# Patient Record
Sex: FEMALE | Race: WHITE | Employment: FULL TIME | URBAN - METROPOLITAN AREA
[De-identification: names, ages, dates, MRNs, and addresses within clinical notes are randomized per-mention and may not be internally consistent; named-entity substitution may affect disease eponyms.]

---

## 2017-01-06 ENCOUNTER — OFFICE VISIT (OUTPATIENT)
Dept: FAMILY MEDICINE | Facility: CLINIC | Age: 32
End: 2017-01-06
Payer: COMMERCIAL

## 2017-01-06 VITALS
SYSTOLIC BLOOD PRESSURE: 108 MMHG | HEIGHT: 64 IN | HEART RATE: 76 BPM | WEIGHT: 120 LBS | TEMPERATURE: 98.7 F | DIASTOLIC BLOOD PRESSURE: 76 MMHG | BODY MASS INDEX: 20.49 KG/M2

## 2017-01-06 DIAGNOSIS — N63.0 LUMP OR MASS IN BREAST: Primary | ICD-10-CM

## 2017-01-06 PROCEDURE — 99213 OFFICE O/P EST LOW 20 MIN: CPT | Performed by: FAMILY MEDICINE

## 2017-01-06 NOTE — NURSING NOTE
"Chief Complaint   Patient presents with     Breast Problem       Initial /76 mmHg  Pulse 76  Temp(Src) 98.7  F (37.1  C) (Oral)  Ht 5' 3.75\" (1.619 m)  Wt 120 lb (54.432 kg)  BMI 20.77 kg/m2  LMP 01/01/2017 Estimated body mass index is 20.77 kg/(m^2) as calculated from the following:    Height as of this encounter: 5' 3.75\" (1.619 m).    Weight as of this encounter: 120 lb (54.432 kg).  BP completed using cuff size: regular    Swapna Corley MA     "

## 2017-01-06 NOTE — MR AVS SNAPSHOT
"              After Visit Summary   2017    Enid Mcbride    MRN: [id number]           Patient Information     Date Of Birth          1985        Visit Information        Provider Department      2017 2:20 PM Diana Morrow DO [facility]        Today's Diagnoses     Lump or mass in breast    -  1        Follow-ups after your visit        Future tests that were ordered for you today     Open Future Orders        Priority Expected Expires Ordered    MA Diagnostic Digital Bilateral Routine  2018            Who to contact     If you have questions or need follow up information about today's clinic visit or your schedule please contact [facility] directly at [phone].  Normal or non-critical lab and imaging results will be communicated to you by MyChart, letter or phone within 4 business days after the clinic has received the results. If you do not hear from us within 7 days, please contact the clinic through MyChart or phone. If you have a critical or abnormal lab result, we will notify you by phone as soon as possible.  Submit refill requests through [employer] or call your pharmacy and they will forward the refill request to us. Please allow 3 business days for your refill to be completed.          Additional Information About Your Visit        MyChart Information     [employer] lets you send messages to your doctor, view your test results, renew your prescriptions, schedule appointments and more. To sign up, go to [email or web address] . Click on \"Log in\" on the left side of the screen, which will take you to the Welcome page. Then click on \"Sign up Now\" on the right side of the page.     You will be asked to enter the access code listed below, as well as some personal information. Please follow the directions to create your username and password.     Your access code is: [email or web address]  Expires: 2017  3:02 PM     Your access code will  in 90 " "days. If you need help or a new code, please call your [place] clinic or [phone].        Care EveryWhere ID     This is your Care EveryWhere ID. This could be used by other organizations to access your [place] medical records  [id number]        Your Vitals Were     Pulse Temperature Height BMI (Body Mass Index) Last Period       76 98.7  F (37.1  C) (Oral) 5' 3.75\" (1.619 m) 20.77 kg/m2 01/01/2017        Blood Pressure from Last 3 Encounters:   01/06/17 108/76    Weight from Last 3 Encounters:   01/06/17 120 lb (54.432 kg)               Primary Care Provider    None Specified       No primary provider on file.        Thank you!     Thank you for choosing [facility]  for your care. Our goal is always to provide you with excellent care. Hearing back from our patients is one way we can continue to improve our services. Please take a few minutes to complete the written survey that you may receive in the mail after your visit with us. Thank you!             Your Updated Medication List - Protect others around you: Learn how to safely use, store and throw away your medicines at www.disposemymeds.org.          This list is accurate as of: 1/6/17  3:02 PM.  Always use your most recent med list.                   Brand Name Dispense Instructions for use    FLAGYL PO      Take once a month or as needed.         "

## 2017-01-06 NOTE — PROGRESS NOTES
"  SUBJECTIVE:                                                    Enid Mcbride is a 31 year old female who presents to clinic today for the following health issues:    Concern - breast problem     Onset: May 2016     Description:   Patient noticed a painful spot on her left breast in May 2016. She thinks that the spot feels different but she is not sure.    Intensity: moderate    Progression of Symptoms:  worsening    Accompanying Signs & Symptoms:  nothing       Previous history of similar problem:   Family history of breast cancer--mother gandmothers     Precipitating factors:   Worsened by: na    Alleviating factors:  Improved by: na       Therapies Tried and outcome: na    It is tender to the touch and has a mild discomfort without touching it.  No obvious lumps.  She removed her IUD last week and is now on the pill.  She has  her child and weaned in 2011.         Problem list and histories reviewed & adjusted, as indicated.  Additional history: as documented    BP Readings from Last 3 Encounters:   01/06/17 108/76    Wt Readings from Last 3 Encounters:   01/06/17 120 lb (54.432 kg)                    ROS:  C: NEGATIVE for fever, chills, change in weight  INTEGUMENTARY/SKIN: NEGATIVE for worrisome rashes, moles or lesions  : normal menstrual cycles and see above    OBJECTIVE:                                                    /76 mmHg  Pulse 76  Temp(Src) 98.7  F (37.1  C) (Oral)  Ht 5' 3.75\" (1.619 m)  Wt 120 lb (54.432 kg)  BMI 20.77 kg/m2  LMP 01/01/2017  Body mass index is 20.77 kg/(m^2).  GENERAL: healthy, alert and no distress  BREAST: no palpable axillary masses or adenopathy, fibrocystic changes bilateral and tenderness left outer upper and outer lower   SKIN: no suspicious lesions or rashes  PSYCH: mentation appears normal and anxious  LYMPH: no cervical, supraclavicular, axillary, or inguinal adenopathy    Diagnostic Test Results:  none      ASSESSMENT/PLAN:                      "                               (N63) Lump or mass in breast  (primary encounter diagnosis)  Comment: likely fibrocytic changes as no discrete mass was felt but will get diagnosis mammoram and ultrasound 3  Plan: CANCELED: MA Diagnostic Digital Bilateral                CONSULTATION/REFERRAL to breast center for imaging    Diana Morrow DO  [facility]

## 2017-03-03 ENCOUNTER — TELEPHONE (OUTPATIENT)
Dept: OTHER | Facility: CLINIC | Age: 32
End: 2017-03-03

## 2017-03-03 NOTE — TELEPHONE ENCOUNTER
"Call Regarding Onboarding P1 Other    Attempt 1    Message     Comments: Per female picked up and said we have the wrong \"number\". DO NOT CALL      Outreach   Kierra Valerio      "

## 2017-03-05 ENCOUNTER — OFFICE VISIT (OUTPATIENT)
Dept: URGENT CARE | Facility: URGENT CARE | Age: 32
End: 2017-03-05
Payer: COMMERCIAL

## 2017-03-05 VITALS
DIASTOLIC BLOOD PRESSURE: 67 MMHG | HEART RATE: 65 BPM | WEIGHT: 119 LBS | SYSTOLIC BLOOD PRESSURE: 116 MMHG | BODY MASS INDEX: 20.59 KG/M2 | RESPIRATION RATE: 15 BRPM | TEMPERATURE: 97.4 F | OXYGEN SATURATION: 100 %

## 2017-03-05 DIAGNOSIS — R42 DIZZINESS: Primary | ICD-10-CM

## 2017-03-05 DIAGNOSIS — R42 EPISODIC LIGHTHEADEDNESS: ICD-10-CM

## 2017-03-05 PROCEDURE — 99203 OFFICE O/P NEW LOW 30 MIN: CPT | Performed by: FAMILY MEDICINE

## 2017-03-05 PROCEDURE — 93000 ELECTROCARDIOGRAM COMPLETE: CPT | Performed by: FAMILY MEDICINE

## 2017-03-05 ASSESSMENT — PAIN SCALES - GENERAL: PAINLEVEL: NO PAIN (0)

## 2017-03-05 NOTE — MR AVS SNAPSHOT
"              After Visit Summary   3/5/2017    Enid Mcbride    MRN: [id number]           Patient Information     Date Of Birth          1985        Visit Information        Provider Department      3/5/2017 4:00 PM Michelle Easley MD [facility]        Today's Diagnoses     Dizziness    -  1      Care Instructions    Recommend ER evaluation for episodes of lightheadedness and syncope with chest tightness          Follow-ups after your visit        Who to contact     If you have questions or need follow up information about today's clinic visit or your schedule please contact [facility] directly at [phone].  Normal or non-critical lab and imaging results will be communicated to you by MyChart, letter or phone within 4 business days after the clinic has received the results. If you do not hear from us within 7 days, please contact the clinic through [employer] or phone. If you have a critical or abnormal lab result, we will notify you by phone as soon as possible.  Submit refill requests through [employer] or call your pharmacy and they will forward the refill request to us. Please allow 3 business days for your refill to be completed.          Additional Information About Your Visit        MyChart Information     [employer] lets you send messages to your doctor, view your test results, renew your prescriptions, schedule appointments and more. To sign up, go to [email or web address] . Click on \"Log in\" on the left side of the screen, which will take you to the Welcome page. Then click on \"Sign up Now\" on the right side of the page.     You will be asked to enter the access code listed below, as well as some personal information. Please follow the directions to create your username and password.     Your access code is: [email or web address]  Expires: 2017  3:02 PM     Your access code will  in 90 days. If you need help or a new code, please call your [facility] or " [phone].        Care EveryWhere ID     This is your Care EveryWhere ID. This could be used by other organizations to access your [place] medical records  [id number]        Your Vitals Were     Pulse Temperature Respirations Pulse Oximetry Breastfeeding? BMI (Body Mass Index)    65 97.4  F (36.3  C) (Oral) 15 100% No 20.59 kg/m2       Blood Pressure from Last 3 Encounters:   03/05/17 116/67   01/06/17 108/76    Weight from Last 3 Encounters:   03/05/17 119 lb (54 kg)   01/06/17 120 lb (54.4 kg)              We Performed the Following     EKG 12-lead complete w/read - Clinics        Primary Care Provider    None Specified       No primary provider on file.        Thank you!     [facility]. Our goal is always to provide you with excellent care. Hearing back from our patients is one way we can continue to improve our services. Please take a few minutes to complete the written survey that you may receive in the mail after your visit with us. Thank you!             Your Updated Medication List - Protect others around you: Learn how to safely use, store and throw away your medicines at www.disposemymeds.org.      Notice  As of 3/5/2017  4:43 PM    You have not been prescribed any medications.

## 2017-03-05 NOTE — PROGRESS NOTES
Cc: lightheadedness    Patient has had episodes of lightheadedness and feels like her neck is constricting.  Gets like a head rush. Once she notices it would have pressure on her chest  But then it would cause anxiety and cause her hands clammy and tingly.    They started in august. Has happened a couple of times and she was fine for a while  Past few weeks has happened 3 times.    Has been to multiple urgent cares for this. At one time she was in urgent care and per patient she was already evaluated thoroughly in an urgent care.     Denies any possibility of pregnancy declined a pregnancy test    Patient is generally anxious.     Seems to always happen when she's driving. Other couple of times she was just sitting at her desk and she would have it.     Patient denies any medical problems    SOCIAL HISTORY: no smoker occasional drink no drug use.  Getting  soon    FH: no family history of early CAD.     No thoughts of harming self or others   Worse with exertion relieved by rest: no  Worse with certain movements or position: no  Associated with food intake or symptoms of GERD: no  Worse with taking in a deep breath: no  Cough/colds or respiratory symptoms: no  Signs or symptoms of DVT no    Problem list, Medication list, Allergies, and Medical/Social/Surgical histories reviewed in [facility] and updated as appropriate.      ROS:  General: negative for fever  Resp: chest pain as above  CV: + as above  ABD: Negative for abd pain.  Neurologic:negative for headache  10 point review of systems negative except for noted above.    OBJECTIVE:  /67  Pulse 65  Temp 97.4  F (36.3  C) (Oral)  Resp 15  Wt 119 lb (54 kg)  SpO2 100%  Breastfeeding? No  BMI 20.59 kg/m2   General:   awake, alert, and cooperative.  NAD.   Head: Normocephalic, atraumatic.  Eyes: Conjunctiva clear,   ENT: normal exam  Heart: Regular rate and rhythm. No murmur.  Lungs: Chest is clear; no wheezes or rales.   Abdomen: soft nontender  Neuro: Alert  and oriented - normal speech. No gross neurologic deficits  Psych: Appropriate mood and affect.   Musculoskeletal: no joint swelling. no chest wall tenderness reproducible of pain. No calf pain or tenderness. No signs or symptoms of DVT   Vascular: no cyanosis      EKG done in clinic, reviewed this myself official cardiology report pending: nonspecific st twave changes in lead 1 and avl.      ASSESSMENT:    ICD-10-CM    1. Dizziness R42 EKG 12-lead complete w/read - Clinics       ICD-10-CM    1. Dizziness R42 EKG 12-lead complete w/read - Clinics   2. Episodic lightheadedness R42            PLAN:   Concern for presyncope.   Today was the most severe episode and have gotten worse past month  Transferred to ER for further evaluation and management and rule out of acute cardiorespiratory process. Discussed with receiving ER facility physician/charge nurse. Patient plans to go to [facility]   Advised about symptoms which might herald more serious problems.    If with any worsening symptoms of chest pain or shortness of breath, or change in the quality of your symptoms, please proceed to ER. Recommend follow up with PCP in a few days for re-evaluation.       Michelle Easley MD

## 2017-03-29 ENCOUNTER — OFFICE VISIT (OUTPATIENT)
Dept: FAMILY MEDICINE | Facility: CLINIC | Age: 32
End: 2017-03-29
Payer: COMMERCIAL

## 2017-03-29 VITALS
HEART RATE: 82 BPM | RESPIRATION RATE: 16 BRPM | OXYGEN SATURATION: 100 % | TEMPERATURE: 98.6 F | DIASTOLIC BLOOD PRESSURE: 80 MMHG | SYSTOLIC BLOOD PRESSURE: 117 MMHG

## 2017-03-29 DIAGNOSIS — M54.2 CERVICALGIA: ICD-10-CM

## 2017-03-29 DIAGNOSIS — G89.29 CHRONIC INTRACTABLE HEADACHE, UNSPECIFIED HEADACHE TYPE: Primary | ICD-10-CM

## 2017-03-29 DIAGNOSIS — R51.9 CHRONIC INTRACTABLE HEADACHE, UNSPECIFIED HEADACHE TYPE: Primary | ICD-10-CM

## 2017-03-29 DIAGNOSIS — R73.09 ABNORMAL GLUCOSE: ICD-10-CM

## 2017-03-29 DIAGNOSIS — R42 DIZZINESS: ICD-10-CM

## 2017-03-29 LAB
ALBUMIN SERPL-MCNC: 4.2 G/DL (ref 3.4–5)
ALP SERPL-CCNC: 39 U/L (ref 40–150)
ALT SERPL W P-5'-P-CCNC: 20 U/L (ref 0–50)
ANION GAP SERPL CALCULATED.3IONS-SCNC: 6 MMOL/L (ref 3–14)
AST SERPL W P-5'-P-CCNC: 18 U/L (ref 0–45)
BASOPHILS # BLD AUTO: 0 10E9/L (ref 0–0.2)
BASOPHILS NFR BLD AUTO: 0.2 %
BILIRUB SERPL-MCNC: 0.4 MG/DL (ref 0.2–1.3)
BUN SERPL-MCNC: 8 MG/DL (ref 7–30)
CALCIUM SERPL-MCNC: 9.2 MG/DL (ref 8.5–10.1)
CHLORIDE SERPL-SCNC: 105 MMOL/L (ref 94–109)
CO2 SERPL-SCNC: 26 MMOL/L (ref 20–32)
CREAT SERPL-MCNC: 0.64 MG/DL (ref 0.52–1.04)
CRP SERPL-MCNC: <2.9 MG/L (ref 0–8)
DIFFERENTIAL METHOD BLD: NORMAL
EOSINOPHIL # BLD AUTO: 0.1 10E9/L (ref 0–0.7)
EOSINOPHIL NFR BLD AUTO: 1.1 %
ERYTHROCYTE [DISTWIDTH] IN BLOOD BY AUTOMATED COUNT: 12.6 % (ref 10–15)
ERYTHROCYTE [SEDIMENTATION RATE] IN BLOOD BY WESTERGREN METHOD: 6 MM/H (ref 0–20)
GFR SERPL CREATININE-BSD FRML MDRD: ABNORMAL ML/MIN/1.7M2
GLUCOSE SERPL-MCNC: 88 MG/DL (ref 70–99)
HBA1C MFR BLD: 4.9 % (ref 4.3–6)
HCT VFR BLD AUTO: 41 % (ref 35–47)
HETEROPH AB SER QL: NEGATIVE
HGB BLD-MCNC: 13.9 G/DL (ref 11.7–15.7)
LYMPHOCYTES # BLD AUTO: 1.7 10E9/L (ref 0.8–5.3)
LYMPHOCYTES NFR BLD AUTO: 26 %
MCH RBC QN AUTO: 30.4 PG (ref 26.5–33)
MCHC RBC AUTO-ENTMCNC: 33.9 G/DL (ref 31.5–36.5)
MCV RBC AUTO: 90 FL (ref 78–100)
MONOCYTES # BLD AUTO: 0.3 10E9/L (ref 0–1.3)
MONOCYTES NFR BLD AUTO: 4.1 %
NEUTROPHILS # BLD AUTO: 4.4 10E9/L (ref 1.6–8.3)
NEUTROPHILS NFR BLD AUTO: 68.6 %
PLATELET # BLD AUTO: 212 10E9/L (ref 150–450)
POTASSIUM SERPL-SCNC: 3.8 MMOL/L (ref 3.4–5.3)
PROT SERPL-MCNC: 7.3 G/DL (ref 6.8–8.8)
RBC # BLD AUTO: 4.57 10E12/L (ref 3.8–5.2)
SODIUM SERPL-SCNC: 137 MMOL/L (ref 133–144)
TSH SERPL DL<=0.005 MIU/L-ACNC: 1 MU/L (ref 0.4–4)
VIT B12 SERPL-MCNC: 504 PG/ML (ref 193–986)
WBC # BLD AUTO: 6.4 10E9/L (ref 4–11)

## 2017-03-29 PROCEDURE — 86038 ANTINUCLEAR ANTIBODIES: CPT | Performed by: PHYSICIAN ASSISTANT

## 2017-03-29 PROCEDURE — 82306 VITAMIN D 25 HYDROXY: CPT | Performed by: PHYSICIAN ASSISTANT

## 2017-03-29 PROCEDURE — 85652 RBC SED RATE AUTOMATED: CPT | Performed by: PHYSICIAN ASSISTANT

## 2017-03-29 PROCEDURE — 86780 TREPONEMA PALLIDUM: CPT | Performed by: PHYSICIAN ASSISTANT

## 2017-03-29 PROCEDURE — 86431 RHEUMATOID FACTOR QUANT: CPT | Performed by: PHYSICIAN ASSISTANT

## 2017-03-29 PROCEDURE — 86140 C-REACTIVE PROTEIN: CPT | Performed by: PHYSICIAN ASSISTANT

## 2017-03-29 PROCEDURE — 36415 COLL VENOUS BLD VENIPUNCTURE: CPT | Performed by: PHYSICIAN ASSISTANT

## 2017-03-29 PROCEDURE — 80050 GENERAL HEALTH PANEL: CPT | Performed by: PHYSICIAN ASSISTANT

## 2017-03-29 PROCEDURE — 83036 HEMOGLOBIN GLYCOSYLATED A1C: CPT | Performed by: PHYSICIAN ASSISTANT

## 2017-03-29 PROCEDURE — 86618 LYME DISEASE ANTIBODY: CPT | Performed by: PHYSICIAN ASSISTANT

## 2017-03-29 PROCEDURE — 99214 OFFICE O/P EST MOD 30 MIN: CPT | Performed by: PHYSICIAN ASSISTANT

## 2017-03-29 PROCEDURE — 82607 VITAMIN B-12: CPT | Performed by: PHYSICIAN ASSISTANT

## 2017-03-29 PROCEDURE — 86308 HETEROPHILE ANTIBODY SCREEN: CPT | Performed by: PHYSICIAN ASSISTANT

## 2017-03-29 RX ORDER — METHOCARBAMOL 750 MG/1
750 TABLET, FILM COATED ORAL 3 TIMES DAILY PRN
Qty: 30 TABLET | Refills: 0 | Status: SHIPPED | OUTPATIENT
Start: 2017-03-29 | End: 2017-04-05

## 2017-03-29 ASSESSMENT — PAIN SCALES - GENERAL: PAINLEVEL: SEVERE PAIN (6)

## 2017-03-29 NOTE — PROGRESS NOTES
SUBJECTIVE:                                                    Enid Mcbride is a 31 year old female who presents to clinic today for the following health issues:       Neck Pain      Duration: 5 WEEKs    Description:  Location: back of neck  Radiation: into the right shoulder and nto the left shoulder    Intensity:  moderate, 6/10    Accompanying signs and symptoms: HA, fatigue, decreased appt, feels foggy and dizziness and possibly near syncope-     History (similar episodes/previous evaluation): None    Precipitating or alleviating factors: None    Therapies tried and outcome: heat, massage    had CT head at [place] - sent by  here- CT was negative  No coryza, no fevers but feeling some throat tightness recently  EKG in  was abnormal but was normal in Emergency Department earlier this month  No tick bites or rashes. Has had some baseline stressors- did get some massages.  NO fam hx autoimmune dz  Had similar shorter lasting episodes of same last year while was on flagyl for BV but no flagyl use since then.  Rare drinker.  Had elev nonfasting glucose in Emergency Department earlier this month.      Oversleeping.  No photophobia/phonophobia              Allergies   Allergen Reactions     Metronidazole        No past medical history on file.      No current outpatient prescriptions on file prior to visit.  No current facility-administered medications on file prior to visit.     Social History   Substance Use Topics     Smoking status: Never Smoker     Smokeless tobacco: Not on file     Alcohol use Yes       ROS:  General: negative for fever  PSCY as above  Neurologic:as above    OBJECTIVE:  /80 (BP Location: Left arm, Patient Position: Chair, Cuff Size: Adult Small)  Pulse 82  Temp 98.6  F (37  C) (Oral)  Resp 16  LMP 03/14/2017  SpO2 100%   General:   awake, alert, and cooperative.  NAD.   Head: Normocephalic, atraumatic.  Eyes: Conjunctiva clear, non icteric. PERRLA. EOMI.  Nose: No lesions.  Mouth /  Throat: Normal dentition.  No oral lesions. Pharynx non erythematous, tonsils without hypertrophy. Tongue midline.  Neck: Supple. BILLY  + diffuse posterior TTP and TTP at occiput.   Heart: Regular rate and rhythm. No murmur.  Lungs: Chest is clear; no wheezes or rales.   Neuro: Alert and oriented - normal speech. Cranial nerves intact.  Normal strength. Using extremities freely.    ASSESSMENT:well appearing- likely multifactorial. does have some elements c/w tension headaches and depression. Will consider neuro vs rheum referral pending labs.  SNRI vs TCA may be helpful in the future after more dx workup completed.    ICD-10-CM    1. Chronic intractable headache, unspecified headache type R51 CBC with platelets differential     Comprehensive metabolic panel     TSH with free T4 reflex     Mononucleosis screen     Lyme Disease Joya with reflex to WB Serum     Anti Treponema     Antinuclear antibody screen by EIA     CRP inflammation     Rheumatoid factor     Erythrocyte sedimentation rate auto     Vitamin D Deficiency   2. Abnormal glucose R73.09 Hemoglobin A1c   3. Cervicalgia M54.2 [facility] REFERR[facility]     methocarbamol (METHOCARBAMOL) 750 MG tablet   4. Dizziness R42 Vitamin B12           PLAN:   Follow up -establish a PCP:    Advised about symptoms which might herald more serious problems.

## 2017-03-29 NOTE — NURSING NOTE
"Chief Complaint   Patient presents with     Neck Pain     HA x4 weeks, neck pain(more so when bendng forward) x1 week, fatigue and decreased appetite. Was seen for HA at [facility] 3/5/17 with negative work up. No fever, rash, vomiting, confusion, CP, SOB or injury to the area noted.       Initial /80 (BP Location: Left arm, Patient Position: Chair, Cuff Size: Adult Small)  Pulse 82  Temp 98.6  F (37  C) (Oral)  Resp 16  LMP 03/14/2017  SpO2 100% Estimated body mass index is 20.59 kg/(m^2) as calculated from the following:    Height as of 1/6/17: 5' 3.75\" (1.619 m).    Weight as of 3/5/17: 119 lb (54 kg).  Medication Reconciliation: complete     Patient roomed, vitals stable    "

## 2017-03-29 NOTE — MR AVS SNAPSHOT
After Visit Summary   3/29/2017    Enid Mcbride    MRN: [id number]           Patient Information     Date Of Birth          1985        Visit Information        Provider Department      3/29/2017 11:20 AM Marcos Reyes PA [facility]        Today's Diagnoses     Chronic intractable headache, unspecified headache type    -  1    Abnormal glucose        Cervicalgia        Dizziness          Care Instructions      Dizziness (Uncertain Cause)  Dizziness is a common symptom. It may be described as lightheadedness, spinning, or feeling like you are going to faint. Dizziness can have many causes.  Be sure to tell the healthcare provider about:    All medicines you take, including prescription, over-the-counter, herbs, and supplements    Any other symptoms you have    Any health problems you are being treated for    Anything that causes the dizziness to get worse or better  Today's exam did not show an exact cause for your dizziness. Other tests may be needed. Follow up with your healthcare provider.  Home care    Dizziness that occurs with sudden standing may be a sign of mild dehydration. Drink extra fluids for the next few days.    If you recently started a new medicine, stopped a medicine, or had the dose of a current medicine changed, talk with the prescribing healthcare provider. Your medicine plan may need adjustment.    If dizziness lasts more than a few seconds, sit or lie down until it passes. This may help prevent injury in case you pass out.    Do not drive or use power tools or dangerous equipment until you have had no dizziness for at least 48 hours.  Follow-up care  Follow up with your healthcare provider for further evaluation within the next 7 days or as advised.  When to seek medical advice  Call your healthcare provider for any of the following:    Worsening of symptoms or new symptoms    Passing out or seizure    Repeated  vomiting    Headache    Palpitations (the sense that your heart is fluttering or beating fast or hard)    Shortness of breath    Blood in vomit or stool (black or red color)    Weakness of an arm or leg or one side of the face    Vision or hearing changes    Trouble walking or speaking    Chest, arm, neck, back, or jaw pain       [id number] [employer]. [address] [place] [address]. All rights reserved. This information is not intended as a substitute for professional medical care. Always follow your healthcare professional's instructions.         * HEADACHE [unspecified]    The cause of your headache today is not clear, but it does not appear to be the sign of any serious illness.  Under stress, some people tense the muscles of their shoulder, neck and scalp without knowing it. If this condition lasts long enough, a TENSION HEADACHE can occur.  A MIGRAINE HEADACHE is caused by changes in blood flow to the brain. It can be mild or severe. A migraine attack may be triggered by emotional stress, hormone changes during the menstrual cycle, oral contraceptives, alcohol use, certain foods containing tyramine, eye strain, weather changes, missing meals, lack of sleep or oversleeping.  Other causes of headache include a viral illness, sinus, ear or throat infection, dental pain and TMJ (jaw joint) pain.  HOME CARE:    If you were given pain medicine for this headache, do not drive yourself home. Arrange for a ride, instead. When you get home, try to sleep. You should feel much better when you wake up.    If you are having nausea or vomiting, follow a light diet until your headache is relieved.    If you have a migraine type headache, use sunglasses when in the daylight or around bright indoor lighting until symptoms improve. Bright glaring light can worsen this kind of headache.  FOLLOW UP with your doctor if the headache is not better within the next 24 hours. If you have frequent headaches you  should discuss a treatment plan with your primary care doctor. By being aware of the earliest signs of headache, and starting treatment right away, you may be able to stop the pain yourself.  GET PROMPT MEDICAL ATTENTION if any of the following occur:    Worsening of your head pain or no improvement within 24 hours    Repeated vomiting (unable to keep liquids down)    Fever over 101 F (38.3 C)    Stiff neck    Extreme drowsiness, confusion or fainting    Weakness of an arm or leg or one side of the face    Difficulty with speech or vision    5687-6969 [facility], [address] [place] [address]. All rights reserved. This information is not intended as a substitute for professional medical care. Always follow your healthcare professional's instructions.          Follow-ups after your visit        Additional Services     [facility] PT, HAND, AND CHIROPRACTIC REFERRAL       **This order will print in the [facility] Scheduling Office**    Physical Therapy, Hand Therapy and Chiropractic Care are available through:    *[facility]  *[facility]  *[facility]    Call one number to schedule at any of the above locations: [phone].    Your provider has referred you to: [facility] - PT and/or Chiropractic Care determined by clinical presentation at [facility] or [facility] Initial Visit    Indication/Reason for Referral: cervicalgia  Onset of Illness: 5 weeks  Therapy Orders: Evaluate and Treat  Special Programs: None  Special Request: None    Charles Soto      Additional Comments for the Therapist or Chiropractor: no    Please be aware that coverage of these services is subject to the terms and limitations of your health insurance plan.  Call member services at your health plan with any benefit or coverage questions.      Please bring the following to your appointment:    *Your personal calendar for scheduling future appointments  *Comfortable clothing             "      Your next 10 appointments already scheduled     Mar 30, 2017  9:20 AM CDT   [facility]    [address] [facility] [address] [phone]           Bring a current list of meds and any records pertaining to this visit.  For Physicals, please bring immunization records and any forms needing to be filled out.  Please arrive 10 minutes early to complete paperwork.              Who to contact     If you have questions or need follow up information about today's clinic visit or your schedule please contact [facility] AAMIR CEDENO directly at [phone].  Normal or non-critical lab and imaging results will be communicated to you by MyChart, letter or phone within 4 business days after the clinic has received the results. If you do not hear from us within 7 days, please contact the clinic through [facility] or phone. If you have a critical or abnormal lab result, we will notify you by phone as soon as possible.  Submit refill requests through [employer] or call your pharmacy and they will forward the refill request to us. Please allow 3 business days for your refill to be completed.          Additional Information About Your Visit        MyChart Information     [employer] lets you send messages to your doctor, view your test results, renew your prescriptions, schedule appointments and more. To sign up, go to [email or web address] . Click on \"Log in\" on the left side of the screen, which will take you to the Welcome page. Then click on \"Sign up Now\" on the right side of the page.     You will be asked to enter the access code listed below, as well as some personal information. Please follow the directions to create your username and password.     Your access code is: [email or web address]  Expires: 2017  4:02 PM     Your access code will  in 90 days. If you need help or a new code, please call your [place] " clinic or [phone].        Care EveryWhere ID     This is your Care EveryWhere ID. This could be used by other organizations to access your [facility] medical records  [id number]        Your Vitals Were     Pulse Temperature Respirations Last Period Pulse Oximetry       82 98.6  F (37  C) (Oral) 16 03/14/2017 100%        Blood Pressure from Last 3 Encounters:   03/29/17 117/80   03/05/17 116/67   01/06/17 108/76    Weight from Last 3 Encounters:   03/05/17 119 lb (54 kg)   01/06/17 120 lb (54.4 kg)              We Performed the Following     Anti Treponema     Antinuclear antibody screen by EIA     CBC with platelets differential     Comprehensive metabolic panel     CRP inflammation     Erythrocyte sedimentation rate auto     Hemoglobin A1c     [facility] PT, HAND, AND CHIROPRACTIC REFERRAL     Lyme Disease Joya with reflex to WB Serum     Mononucleosis screen     Rheumatoid factor     TSH with free T4 reflex          Today's Medication Changes          These changes are accurate as of: 3/29/17  1:26 PM.  If you have any questions, ask your nurse or doctor.               Start taking these medicines.        Dose/Directions    methocarbamol 750 MG tablet   Commonly known as:  methocarbamol   Used for:  Cervicalgia        Dose:  750 mg   Take 1 tablet (750 mg) by mouth 3 times daily as needed for muscle spasms   Quantity:  30 tablet   Refills:  0            Where to get your medicines      These medications were sent to [facility] - [place], [place] - [address]  [address], [facility] [address]     Phone:  [phone]     methocarbamol 750 MG tablet                Primary Care Provider    None Specified       No primary provider on file.        Thank you!     Thank you for choosing [facility]  for your care. Our goal is always to provide you with excellent care. Hearing back from our patients is one way we can continue to improve our services. Please take a few  minutes to complete the written survey that you may receive in the mail after your visit with us. Thank you!             Your Updated Medication List - Protect others around you: Learn how to safely use, store and throw away your medicines at www.disposemymeds.org.          This list is accurate as of: 3/29/17  1:26 PM.  Always use your most recent med list.                   Brand Name Dispense Instructions for use    methocarbamol 750 MG tablet    methocarbamol    30 tablet    Take 1 tablet (750 mg) by mouth 3 times daily as needed for muscle spasms

## 2017-03-29 NOTE — PATIENT INSTRUCTIONS
Dizziness (Uncertain Cause)  Dizziness is a common symptom. It may be described as lightheadedness, spinning, or feeling like you are going to faint. Dizziness can have many causes.  Be sure to tell the healthcare provider about:    All medicines you take, including prescription, over-the-counter, herbs, and supplements    Any other symptoms you have    Any health problems you are being treated for    Anything that causes the dizziness to get worse or better  Today's exam did not show an exact cause for your dizziness. Other tests may be needed. Follow up with your healthcare provider.  Home care    Dizziness that occurs with sudden standing may be a sign of mild dehydration. Drink extra fluids for the next few days.    If you recently started a new medicine, stopped a medicine, or had the dose of a current medicine changed, talk with the prescribing healthcare provider. Your medicine plan may need adjustment.    If dizziness lasts more than a few seconds, sit or lie down until it passes. This may help prevent injury in case you pass out.    Do not drive or use power tools or dangerous equipment until you have had no dizziness for at least 48 hours.  Follow-up care  Follow up with your healthcare provider for further evaluation within the next 7 days or as advised.  When to seek medical advice  Call your healthcare provider for any of the following:    Worsening of symptoms or new symptoms    Passing out or seizure    Repeated vomiting    Headache    Palpitations (the sense that your heart is fluttering or beating fast or hard)    Shortness of breath    Blood in vomit or stool (black or red color)    Weakness of an arm or leg or one side of the face    Vision or hearing changes    Trouble walking or speaking    Chest, arm, neck, back, or jaw pain       [id number] [employer]. [address] [place] [address], [place] [address]. All rights reserved. This information is not intended as a substitute for  professional medical care. Always follow your healthcare professional's instructions.         * HEADACHE [unspecified]    The cause of your headache today is not clear, but it does not appear to be the sign of any serious illness.  Under stress, some people tense the muscles of their shoulder, neck and scalp without knowing it. If this condition lasts long enough, a TENSION HEADACHE can occur.  A MIGRAINE HEADACHE is caused by changes in blood flow to the brain. It can be mild or severe. A migraine attack may be triggered by emotional stress, hormone changes during the menstrual cycle, oral contraceptives, alcohol use, certain foods containing tyramine, eye strain, weather changes, missing meals, lack of sleep or oversleeping.  Other causes of headache include a viral illness, sinus, ear or throat infection, dental pain and TMJ (jaw joint) pain.  HOME CARE:    If you were given pain medicine for this headache, do not drive yourself home. Arrange for a ride, instead. When you get home, try to sleep. You should feel much better when you wake up.    If you are having nausea or vomiting, follow a light diet until your headache is relieved.    If you have a migraine type headache, use sunglasses when in the daylight or around bright indoor lighting until symptoms improve. Bright glaring light can worsen this kind of headache.  FOLLOW UP with your doctor if the headache is not better within the next 24 hours. If you have frequent headaches you should discuss a treatment plan with your primary care doctor. By being aware of the earliest signs of headache, and starting treatment right away, you may be able to stop the pain yourself.  GET PROMPT MEDICAL ATTENTION if any of the following occur:    Worsening of your head pain or no improvement within 24 hours    Repeated vomiting (unable to keep liquids down)    Fever over 101 F (38.3 C)    Stiff neck    Extreme drowsiness, confusion or fainting    Weakness of an arm or leg or  one side of the face    Difficulty with speech or vision    [id number] [employer], [address] [place] [address], [place] [address]. All rights reserved. This information is not intended as a substitute for professional medical care. Always follow your healthcare professional's instructions.

## 2017-03-30 LAB
ANA SER QL IA: NORMAL
B BURGDOR IGG+IGM SER QL: 0.06 (ref 0–0.89)
DEPRECATED CALCIDIOL+CALCIFEROL SERPL-MC: 14 UG/L (ref 20–75)
RHEUMATOID FACT SER NEPH-ACNC: <20 IU/ML (ref 0–20)
T PALLIDUM IGG+IGM SER QL: NEGATIVE

## 2017-03-31 ENCOUNTER — TELEPHONE (OUTPATIENT)
Dept: URGENT CARE | Facility: URGENT CARE | Age: 32
End: 2017-03-31

## 2017-03-31 DIAGNOSIS — R42 DIZZINESS: ICD-10-CM

## 2017-03-31 DIAGNOSIS — G89.29 CHRONIC INTRACTABLE HEADACHE, UNSPECIFIED HEADACHE TYPE: ICD-10-CM

## 2017-03-31 DIAGNOSIS — R51.9 CHRONIC INTRACTABLE HEADACHE, UNSPECIFIED HEADACHE TYPE: ICD-10-CM

## 2017-03-31 DIAGNOSIS — E55.9 VITAMIN D DEFICIENCY: Primary | ICD-10-CM

## 2017-03-31 NOTE — TELEPHONE ENCOUNTER
Reason for Call:  Request for results:    Name of test or procedure: blood    Date of test of procedure: 03/29/2017    Location of the test or procedure: 03/29    OK to leave the result message on voice mail or with a family member? YES    Phone number Patient can be reached at:  Cell number on file:    Telephone Information:   Mobile [phone]       Additional comments: any    Call taken on 3/31/2017 at 8:44 AM by Tosha Khan

## 2017-03-31 NOTE — TELEPHONE ENCOUNTER
Patient was informed of test results, recommendations and referral # scheduling per below.   Patient verbalized understanding of all information given.  Ventura Johnson RN

## 2017-03-31 NOTE — TELEPHONE ENCOUNTER
Her labs were all normal excpet for low vit D- which I dont think explains all her symptoms but is treatable- I will sned a rx to the pharmacy.  I will also refer her to neuro as we discussed at visit- please provider her the sched number\  Steven Reyes PA-C

## 2017-04-05 ENCOUNTER — OFFICE VISIT (OUTPATIENT)
Dept: NEUROLOGY | Facility: CLINIC | Age: 32
End: 2017-04-05
Payer: COMMERCIAL

## 2017-04-05 VITALS
BODY MASS INDEX: 20.59 KG/M2 | HEIGHT: 64 IN | SYSTOLIC BLOOD PRESSURE: 93 MMHG | DIASTOLIC BLOOD PRESSURE: 63 MMHG | HEART RATE: 75 BPM | WEIGHT: 120.6 LBS

## 2017-04-05 DIAGNOSIS — G43.809 VESTIBULAR MIGRAINE: Primary | ICD-10-CM

## 2017-04-05 DIAGNOSIS — R29.2 BRISK DEEP TENDON REFLEXES: ICD-10-CM

## 2017-04-05 DIAGNOSIS — E55.9 VITAMIN D DEFICIENCY: ICD-10-CM

## 2017-04-05 PROCEDURE — 99205 OFFICE O/P NEW HI 60 MIN: CPT | Performed by: PSYCHIATRY & NEUROLOGY

## 2017-04-05 RX ORDER — VERAPAMIL HYDROCHLORIDE 40 MG/1
TABLET ORAL
Qty: 270 TABLET | Refills: 1 | Status: SHIPPED | OUTPATIENT
Start: 2017-04-05 | End: 2018-02-18

## 2017-04-05 RX ORDER — SUMATRIPTAN 25 MG/1
25 TABLET, FILM COATED ORAL
Qty: 12 TABLET | Refills: 5 | Status: SHIPPED | OUTPATIENT
Start: 2017-04-05 | End: 2018-02-18

## 2017-04-05 NOTE — MR AVS SNAPSHOT
After Visit Summary   4/5/2017    Enid Mcbride    MRN: [id number]           Patient Information     Date Of Birth          1985        Visit Information        Provider Department      4/5/2017 8:00 AM Sachi Parker MD [facility]        Today's Diagnoses     Vestibular migraine    -  1    Vitamin D deficiency        Brisk deep tendon reflexes          Care Instructions    AFTER VISIT SUMMARY (AVS)  Orders Placed This Encounter   Procedures     MR Thoracic Spine w/o & w Contrast       Signed Prescriptions:                        Disp   Refills    verapamil (CALAN) 40 MG tablet             270 ta*1        Sig: Week 1 & 2: 40 mg at bed time. Week 3 & 4: 40 mg two           times a day. Week 5 and afterwards: 40 mg three           times a day.  Authorizing Provider: SACHI PARKER    SUMAtriptan (IMITREX) 25 MG tablet         12 tab*5        Sig: Take 1 tablet (25 mg) by mouth at onset of headache           for migraine  Authorizing Provider: SACHI PARKER    Establish care with Primary Care Doctor for continuity of care    Discuss with her new PRIMARY CARE PROVIDER regarding issues of anxiety and vitamin D deficiency.     Diagnostic possibilities reviewed and reasons for work-up explained    Preventive Neurology: Encouraged to keep physically and mentally active with particular emphasis on daily stretching exercises, walking, and healthy eating.    Additional  recommendations after the work-up    To call us for follow-up appointment after the work-up    Thanks  ----------------------------------------------------------------------------------------------------------------------  Additional information:    [facility]: [phone]                  Follow-ups after your visit        Follow-up notes from your care team     Return in about 4 weeks (around 5/3/2017).      Future tests that were ordered for you today     Open Future Orders        Priority  "Expected Expires Ordered    MR Thoracic Spine w/o & w Contrast Routine  4/5/2018 4/5/2017            Who to contact     If you have questions or need follow up information about today's clinic visit or your schedule please contact [facility] directly at [phone].  Normal or non-critical lab and imaging results will be communicated to you by MyChart, letter or phone within 4 business days after the clinic has received the results. If you do not hear from us within 7 days, please contact the clinic through [employer] or phone. If you have a critical or abnormal lab result, we will notify you by phone as soon as possible.  Submit refill requests through [employer] or call your pharmacy and they will forward the refill request to us. Please allow 3 business days for your refill to be completed.          Additional Information About Your Visit        MyChart Information     [employer] gives you secure access to your electronic health record. If you see a primary care provider, you can also send messages to your care team and make appointments. If you have questions, please call your primary care clinic.  If you do not have a primary care provider, please call [phone] and they will assist you.        Care EveryWhere ID     This is your Care EveryWhere ID. This could be used by other organizations to access your [facility] medical records  [id number]        Your Vitals Were     Pulse Height Last Period BMI (Body Mass Index)          75 1.619 m (5' 3.75\") 03/14/2017 20.86 kg/m2         Blood Pressure from Last 3 Encounters:   04/05/17 93/63   03/29/17 117/80   03/05/17 116/67    Weight from Last 3 Encounters:   04/05/17 54.7 kg (120 lb 9.6 oz)   03/05/17 54 kg (119 lb)   01/06/17 54.4 kg (120 lb)                 Today's Medication Changes          These changes are accurate as of: 4/5/17  9:28 AM.  If you have any questions, ask your nurse or doctor.               Start taking these medicines.        " Dose/Directions    SUMAtriptan 25 MG tablet   Commonly known as:  IMITREX   Used for:  Vestibular migraine   Started by:  Sachi Parker MD        Dose:  25 mg   Take 1 tablet (25 mg) by mouth at onset of headache for migraine   Quantity:  12 tablet   Refills:  5       verapamil 40 MG tablet   Commonly known as:  CALAN   Used for:  Vestibular migraine   Started by:  Sachi Parker MD        Week 1 & 2: 40 mg at bed time. Week 3 & 4: 40 mg two times a day. Week 5 and afterwards: 40 mg three times a day.   Quantity:  270 tablet   Refills:  1            Where to get your medicines      These medications were sent to [facility] - [place], [place] [address] [facility] [address] [place] [address]  [address] [facility] [address], [place] [address]     Phone:  [phone]     SUMAtriptan 25 MG tablet    verapamil 40 MG tablet                Primary Care Provider    None Specified       No primary provider on file.        Thank you!     Thank you for choosing [facility]  for your care. Our goal is always to provide you with excellent care. Hearing back from our patients is one way we can continue to improve our services. Please take a few minutes to complete the written survey that you may receive in the mail after your visit with us. Thank you!             Your Updated Medication List - Protect others around you: Learn how to safely use, store and throw away your medicines at www.disposemymeds.org.          This list is accurate as of: 4/5/17  9:28 AM.  Always use your most recent med list.                   Brand Name Dispense Instructions for use    cholecalciferol 65804 UNITS capsule    VITAMIN D3    30 capsule    1 cap daily for 3 days then weekly for 6 weeks then monthly after that       SUMAtriptan 25 MG tablet    IMITREX    12 tablet    Take 1 tablet (25 mg) by mouth at onset of headache for migraine       verapamil 40 MG tablet    CALAN    270 tablet    Week 1 & 2: 40 mg at bed time. Week  3 & 4: 40 mg two times a day. Week 5 and afterwards: 40 mg three times a day.

## 2017-04-05 NOTE — NURSING NOTE
"Chief Complaint   Patient presents with     Neurologic Problem     Headache along with lightheadedness started in August 2016       Initial BP 93/63 (BP Location: Right arm, Patient Position: Chair, Cuff Size: Adult Regular)  Pulse 75  Ht 5' 3.75\" (1.619 m)  Wt 120 lb 9.6 oz (54.7 kg)  LMP 03/14/2017  BMI 20.86 kg/m2 Estimated body mass index is 20.86 kg/(m^2) as calculated from the following:    Height as of this encounter: 5' 3.75\" (1.619 m).    Weight as of this encounter: 120 lb 9.6 oz (54.7 kg).  Medication Reconciliation: complete   Betty Ybarra MA      "

## 2017-04-05 NOTE — PATIENT INSTRUCTIONS
AFTER VISIT SUMMARY (AVS)  Orders Placed This Encounter   Procedures     MR Thoracic Spine w/o & w Contrast       Signed Prescriptions:                        Disp   Refills    verapamil (CALAN) 40 MG tablet             270 ta*1        Sig: Week 1 & 2: 40 mg at bed time. Week 3 & 4: 40 mg two           times a day. Week 5 and afterwards: 40 mg three           times a day.  Authorizing Provider: BAILEY DAVID    SUMAtriptan (IMITREX) 25 MG tablet         12 tab*5        Sig: Take 1 tablet (25 mg) by mouth at onset of headache           for migraine  Authorizing Provider: BAILEY DAVID    Establish care with Primary Care Doctor for continuity of care    Discuss with her new PRIMARY CARE PROVIDER regarding issues of anxiety and vitamin D deficiency.     Diagnostic possibilities reviewed and reasons for work-up explained    Preventive Neurology: Encouraged to keep physically and mentally active with particular emphasis on daily stretching exercises, walking, and healthy eating.    Additional  recommendations after the work-up    To call us for follow-up appointment after the work-up    Thanks  ----------------------------------------------------------------------------------------------------------------------  Additional information:    [facility]: [phone]

## 2017-04-05 NOTE — PROGRESS NOTES
"INITIAL NEUROLOGY CONSULTATION  [id number]    NAME: Ms. Enid Mcbride  :  1985 (31 year old)  DATE OF VISIT: 2017  PRIMARY PROVIDER: Marcos Reyes PA  REASON FOR CONSULTATION: Lightheadedness and headache    HISTORY OF PRESENT ILLNESS ([place]): Ms. Enid Mcbride is seen at the request of Marcos Reyes PA. Accompanied by fiance. She relates that \"lightheadedness is my priority than the headache symptoms\".   31 year old woman presents with symptoms of Lightheadedness since . Gradual onset.  The description of lightheadedness is feeling as she is going to faint. Also starts having pressure sensation back of head and spreading to all over head, recently-more on the forehead. She need to hold to nearby object. She tends to sit down or lie down, depending upon, where it occurs.  Generally, it  Is present daily, lasting day whole day. Previously it occurred only 2-3 times per week.  Previously it would last only 3-4 hours. She feels fine in between these episodes previously.  Made worse by change in posture from sitting to standing. They do not occur in lying down position. The associated symptoms include: nausea, blurred vision, paresthesias around mouth along with paresthesias of both hands and feet.           Denies any chest pain, palpitations, or shortness of breath, speech impairment, weakness of left/right half of the body. She is fully aware of surroundings. Denies any confusion.    On further enquiry, associated symptoms of headache. Denies any problem with walking. No history of recent falls. No symptoms of ear ringing and or fullness.     H/O episodic headache has been occurring for  2 -3 weeks. Distributed all over the head and at times in different areas of head. Described as throbbing in character, with severity of 8 /10. The associated symptoms include  nausea, photophobia, sonophobia and lightheadedness. No dietary or environmental triggers  Occasional " history of snoring especially when sleeping on her stomach.   No family history of brain aneurysm or hemorrhage.  History of anxiety and stress. Seen psychologist a few times. Recent consultation with psychologist in Dec. 2016. H/o panic attacks. 1st panic attack in Jan. 2017. Last night another panic attack. Worried about her health. Feels that her head is going to explode.      MEDICATIONS: OTC tylenol infrequently. Tylenol does not help her. Medications not helping include Ibuprofen and naproxen.       Previous Diagnostic Tests:  Imaging:   IMPRESSION:   1.  No acute intracranial abnormality.   Result Narrative   EXAM: CT HEAD WITHOUT CONTRAST, March 5, 2017    CLINICAL DATA: Headache    COMPARISON: None available.    TECHNIQUE: Noncontrast.    FINDINGS: Ventricles and sulci are congruent. Basilar cisterns are patent.    No acute intracranial hemorrhage, mass effect, or midline shift. Gray-white differentiation is maintained. Symmetric density to the [place].    Intact calvarium. No mastoid or middle ear fluid.         Review of Systems: All negative except as noted in the [place] and Identifying information    History reviewed. No pertinent past medical history.  Past Surgical History:   Procedure Laterality Date     FRACTURE SURGERY Left     Leg fracture age 9. Required surgery     Past Surgical History:   Procedure Laterality Date     FRACTURE SURGERY Left     Leg fracture age 9. Required surgery       CURRENT MEDICATIONS: Reviewed & updated in the EPIC  History reviewed. No pertinent past medical history.  Past Surgical History:   Procedure Laterality Date     FRACTURE SURGERY Left     Leg fracture age 9. Required surgery       *Personal and social history reviewed and documented in Epic.    NEUROLOGICAL EXAMINATION:      General appearance: Pleasant female sitting comfortably in a chair  BP 93/63 (BP Location: Right arm, Patient Position: Chair, Cuff Size: Adult Regular)  Pulse 75  Ht 1.619 m (5'  "3.75\")  Wt 54.7 kg (120 lb 9.6 oz)  LMP 03/14/2017  BMI 20.86 kg/m2    Head & Neck:  Neck supple  No carotid bruit    Neurological  Examination:  Mental Status:    Alert and oriented to time, place and person    Recent and remote memory intact    Attention span and concentration normal    Adequate fund of knowledge    Speech: Normal    Cranial Nerves:  Cranial Nerve 2:    Visual acuity normal to finger counting    Pupils equal and reacting to light    No field defect by confrontation    Fundus reveals normal disc margins      Cranial Nerves 3, 4 and 6:    Eye movements normal in all directions of gaze    Cranial Nerve 5:     Normal facial sensory and motor functions    Cranial Nerve 7:     Symmetrical face without motor weakness     Cranial Nerve 8:    Normal hearing to whispered sounds    Cranial Nerves 9, 10:    Normal palate and uvula movements    Cranial Nerve 11:    Shoulder shrug symmetrical    Cranial Nerve 12:    Tongue midline with normal movements    Motor:    Tone and bulk: Normal in both upper and lower limbs    Power: No drift of the outstretched arms          Normal strength in all muscle groups of both upper and lower limbs     Coordination:    Finger nose test and rapid alternate movements normal bilaterally    Heel-shin test normal bilaterally    Deep Tendon Reflexes:    Upper limbs: Equal and symmetrical    Lower limbs: Equal and symmetrical with intact ankle jerks and down going plantars      Sensations:    Touch/Pin prick: Normal at both and upper and lower limbs    Vibration (128 Hz): Normal at both ankles    Position sense: Normal at both big toes    Gait:    Walks with a normal stride length     Can stand on heels and toes    Can walk on heels and toes    Normal tandem walking    Romberg Sign: Negative    IMPRESSION:  Encounter Diagnoses   Name Primary?     Vestibular migraine Yes     Vitamin D deficiency      Brisk deep tendon reflexes      COMMENTS: She is likely to have some component of " "vestibular migraine although she does not have true vertigo. As you know it is not necessary to have true vertigo. She was very anxious throughout the consultation with her concern that \"she could die and or she has some serious disease\". She was assured that she does not any definite features to suggest brain tumor and or multiple sclerosis today as these tow conditions were also major concerns. Assured her that I will monitor her symptoms and signs and will take it from there. It will be important to provide her symptomatic relief for her headaches.  Brisk reflexes are of unclear cause and therefore I have arranged for her to have imaging of thoracic cord. If Thoracic cord is normal, would consider MRI imaging of cervical cord or even brain if needed. Need to take step wise approach.   She seems to be receptive. Her accompanying fiance's help with calming her down appreciated       PLANS:   Patient Instructions     AFTER VISIT SUMMARY (AVS)  Orders Placed This Encounter   Procedures     MR Thoracic Spine w/o & w Contrast       Signed Prescriptions:                        Disp   Refills    verapamil (CALAN) 40 MG tablet             270 ta*1        Sig: Week 1 & 2: 40 mg at bed time. Week 3 & 4: 40 mg two           times a day. Week 5 and afterwards: 40 mg three           times a day.  Authorizing Provider: BAILEY DAVID    SUMAtriptan (IMITREX) 25 MG tablet         12 tab*5        Sig: Take 1 tablet (25 mg) by mouth at onset of headache           for migraine  Authorizing Provider: BAILEY DAVID    Establish care with Primary Care Doctor for continuity of care    Discuss with her new PRIMARY CARE PROVIDER regarding issues of anxiety and vitamin D deficiency.     Diagnostic possibilities reviewed and reasons for work-up explained    Preventive Neurology: Encouraged to keep physically and mentally active with particular emphasis on daily stretching exercises, walking, and healthy eating.    Additional  " recommendations after the work-up    To call us for follow-up appointment after the work-up    Thanks  ----------------------------------------------------------------------------------------------------------------------  Additional information:    [facility]: [phone]  Thanks to Marcos Reyes PA for allowing me to participate in  Ms. Mcbride's care.  Please feel free to call me with any questions or concerns.     Time with patient 80 minutes, greater than 50% of which was counseling and coordination of care.    Sachi Parker MD, MRCPI  Neurologist    CC: Marcos Reyes PA

## 2017-05-17 ENCOUNTER — MYC MEDICAL ADVICE (OUTPATIENT)
Dept: FAMILY MEDICINE | Facility: CLINIC | Age: 32
End: 2017-05-17

## 2017-05-17 NOTE — TELEPHONE ENCOUNTER
Panel Management Review      Patient has the following on her problem list: None      Composite cancer screening  Chart review shows that this patient is due/due soon for the following Pap Smear  Summary:    Patient is due/failing the following:   PAP    Action needed:   Patient needs office visit for a physical with pap smear.    Type of outreach:    Sent [employer] message.    Questions for provider review:    None                                                                                                                                    Swapna Corley MA      Chart routed to Care Team .

## 2017-05-17 NOTE — LETTER
[facility]  [address] [place] [id number] [phone]                                                                                                May 24, 2017    Enid Mcbride  [address] [place] [address]        Dear Ms. Mcbride,    At [facility] we care about your health and well-being. A review of your chart has indicated that you are due for a pap and physical exam. Please contact us at [phone] to schedule your appointment.     If you have already had one or all of the above screening tests at another facility, please call us to update your chart.     You may contact the clinic at [phone] if you have any questions or concerns about this request.      Sincerely,      [facility] Care Staff/ sd

## 2017-05-24 ENCOUNTER — RADIANT APPOINTMENT (OUTPATIENT)
Dept: MRI IMAGING | Facility: CLINIC | Age: 32
End: 2017-05-24
Attending: PSYCHIATRY & NEUROLOGY
Payer: COMMERCIAL

## 2017-05-24 DIAGNOSIS — R29.2 BRISK DEEP TENDON REFLEXES: ICD-10-CM

## 2017-05-24 PROCEDURE — A9585 GADOBUTROL INJECTION: HCPCS | Performed by: PSYCHIATRY & NEUROLOGY

## 2017-05-24 PROCEDURE — 72157 MRI CHEST SPINE W/O & W/DYE: CPT | Mod: TC

## 2017-05-24 RX ORDER — GADOBUTROL 604.72 MG/ML
7.5 INJECTION INTRAVENOUS ONCE
Status: COMPLETED | OUTPATIENT
Start: 2017-05-24 | End: 2017-05-24

## 2017-05-24 RX ADMIN — GADOBUTROL 5.5 ML: 604.72 INJECTION INTRAVENOUS at 07:42

## 2017-05-29 NOTE — PROGRESS NOTES
[place] Ms. Mcbride,     Your MRI thoracic spine study shows small cavity at the upper end of her thoracic spine.  I would discuss in detail with the you on 5/31/2017.  Follow-up as recommended during your recent visit.    Thanks,       Sachi Parker MD, [facility]  Neurologist

## 2017-05-30 ENCOUNTER — TELEPHONE (OUTPATIENT)
Dept: FAMILY MEDICINE | Facility: CLINIC | Age: 32
End: 2017-05-30

## 2017-05-30 NOTE — TELEPHONE ENCOUNTER
Reason for Call:  Other call back    Detailed comments:  Patient calling. She received her lab results. Please call her to discuss more.     Phone Number Patient can be reached at: Home number on file [phone] (home)    Best Time: any     Can we leave a detailed message on this number? YES    Call taken on 5/30/2017 at 11:07 AM by Dayna Boogie

## 2017-05-31 ENCOUNTER — OFFICE VISIT (OUTPATIENT)
Dept: NEUROLOGY | Facility: CLINIC | Age: 32
End: 2017-05-31
Payer: COMMERCIAL

## 2017-05-31 VITALS
WEIGHT: 118.4 LBS | BODY MASS INDEX: 20.22 KG/M2 | SYSTOLIC BLOOD PRESSURE: 105 MMHG | DIASTOLIC BLOOD PRESSURE: 72 MMHG | HEIGHT: 64 IN | HEART RATE: 91 BPM

## 2017-05-31 DIAGNOSIS — G43.109 VERTIGINOUS MIGRAINE: Primary | ICD-10-CM

## 2017-05-31 DIAGNOSIS — G95.0 SYRINX OF SPINAL CORD (H): ICD-10-CM

## 2017-05-31 DIAGNOSIS — E55.9 VITAMIN D DEFICIENCY: ICD-10-CM

## 2017-05-31 DIAGNOSIS — R29.2: ICD-10-CM

## 2017-05-31 PROCEDURE — 99215 OFFICE O/P EST HI 40 MIN: CPT | Performed by: PSYCHIATRY & NEUROLOGY

## 2017-05-31 RX ORDER — CHOLECALCIFEROL (VITAMIN D3) 50 MCG
2000 TABLET ORAL DAILY
Qty: 90 TABLET | Refills: 3 | Status: SHIPPED | OUTPATIENT
Start: 2017-05-31 | End: 2018-02-18

## 2017-05-31 NOTE — MR AVS SNAPSHOT
After Visit Summary   5/31/2017    Enid Mcbride    MRN: [id number]           Patient Information     Date Of Birth          1985        Visit Information        Provider Department      5/31/2017 8:40 AM Sachi Parker MD [facility]        Today's Diagnoses     Vertiginous migraine    -  1    Syrinx of spinal cord (H)        Vitamin D deficiency        Deep tendon reflex increase          Care Instructions    AFTER VISIT SUMMARY (AVS)  Signed Prescriptions:                        Disp   Refills    Cholecalciferol (VITAMIN D) 2000 UNITS tab*90 tab*3        Sig: Take 2,000 Units by mouth daily  Authorizing Provider: SACHI PARKER    Advised to start taking Verapamil as suggested before    Diagnostic possibilities reviewed    Preventive Neurology: Encouraged to keep physically and mentally active with particular emphasis on daily stretching exercises, walking, and healthy eating.    Additional  recommendations after the work-up    To call us for follow-up appointment after the work-up    Thanks     [facility]: [phone]              Follow-ups after your visit        Follow-up notes from your care team     Return in about 2 weeks (around 6/14/2017).      Future tests that were ordered for you today     Open Future Orders        Priority Expected Expires Ordered    MR Brain w/o & w Contrast Routine  5/31/2018 5/31/2017    MR Cervical Spine w/o & w Contrast Routine  5/31/2018 5/31/2017            Who to contact     If you have questions or need follow up information about today's clinic visit or your schedule please contact [facility] directly at [phone].  Normal or non-critical lab and imaging results will be communicated to you by MyChart, letter or phone within 4 business days after the clinic has received the results. If you do not hear from us within 7 days, please contact the clinic through MyChart or phone. If you have a critical  "or abnormal lab result, we will notify you by phone as soon as possible.  Submit refill requests through [employer] or call your pharmacy and they will forward the refill request to us. Please allow 3 business days for your refill to be completed.          Additional Information About Your Visit        [facility] Information     [employer] gives you secure access to your electronic health record. If you see a primary care provider, you can also send messages to your care team and make appointments. If you have questions, please call your primary care clinic.  If you do not have a primary care provider, please call [phone] and they will assist you.        Care EveryWhere ID     This is your Care EveryWhere ID. This could be used by other organizations to access your [facility] medical records  [id number]        Your Vitals Were     Pulse Height BMI (Body Mass Index)             91 1.619 m (5' 3.75\") 20.48 kg/m2          Blood Pressure from Last 3 Encounters:   05/31/17 105/72   04/05/17 93/63   03/29/17 117/80    Weight from Last 3 Encounters:   05/31/17 53.7 kg (118 lb 6.4 oz)   04/05/17 54.7 kg (120 lb 9.6 oz)   03/05/17 54 kg (119 lb)                 Today's Medication Changes          These changes are accurate as of: 5/31/17  9:41 AM.  If you have any questions, ask your nurse or doctor.               Start taking these medicines.        Dose/Directions    vitamin D 2000 UNITS tablet   Used for:  Vitamin D deficiency   Started by:  Sachi Parker MD        Dose:  2000 Units   Take 2,000 Units by mouth daily   Quantity:  90 tablet   Refills:  3            Where to get your medicines      These medications were sent to [facility] - [place], [place] - [address] [place] [address]  [address], [place] [address]     Phone:  [phone]     vitamin D 2000 UNITS tablet                Primary Care Provider    None Specified       No primary provider on file.        Thank you!  "    Thank you for choosing [facility] [place]  for your care. Our goal is always to provide you with excellent care. Hearing back from our patients is one way we can continue to improve our services. Please take a few minutes to complete the written survey that you may receive in the mail after your visit with us. Thank you!             Your Updated Medication List - Protect others around you: Learn how to safely use, store and throw away your medicines at www.disposemymeds.org.          This list is accurate as of: 5/31/17  9:41 AM.  Always use your most recent med list.                   Brand Name Dispense Instructions for use    SUMAtriptan 25 MG tablet    IMITREX    12 tablet    Take 1 tablet (25 mg) by mouth at onset of headache for migraine       verapamil 40 MG tablet    CALAN    270 tablet    Week 1 & 2: 40 mg at bed time. Week 3 & 4: 40 mg two times a day. Week 5 and afterwards: 40 mg three times a day.       vitamin D 2000 UNITS tablet     90 tablet    Take 2,000 Units by mouth daily

## 2017-05-31 NOTE — NURSING NOTE
IMAGING: YES.  Result letter sent by mail  BLOOD TEST RESULTS: Not recommended on previous visit  Referral: NA  Old records: NA  EEG: NA

## 2017-05-31 NOTE — PATIENT INSTRUCTIONS
AFTER VISIT SUMMARY (AVS)  Signed Prescriptions:                        Disp   Refills    Cholecalciferol (VITAMIN D) 2000 UNITS tab*90 tab*3        Sig: Take 2,000 Units by mouth daily  Authorizing Provider: BAILEY DAVID    Advised to start taking Verapamil as suggested before    Diagnostic possibilities reviewed    Preventive Neurology: Encouraged to keep physically and mentally active with particular emphasis on daily stretching exercises, walking, and healthy eating.    Additional  recommendations after the work-up    To call us for follow-up appointment after the work-up    Thanks     [facility]: [phone]

## 2017-05-31 NOTE — NURSING NOTE
"Chief Complaint   Patient presents with     RECHECK     MRI results       Initial /72 (BP Location: Right arm, Patient Position: Chair, Cuff Size: Adult Regular)  Pulse 91  Ht 5' 3.75\" (1.619 m)  Wt 118 lb 6.4 oz (53.7 kg)  BMI 20.48 kg/m2 Estimated body mass index is 20.48 kg/(m^2) as calculated from the following:    Height as of this encounter: 5' 3.75\" (1.619 m).    Weight as of this encounter: 118 lb 6.4 oz (53.7 kg).  Medication Reconciliation: complete   Betty Ybarra MA      "

## 2017-05-31 NOTE — TELEPHONE ENCOUNTER
Patient is being seen today in clinic, this will be addressed during the office visit today  Betty Ybarra MA

## 2017-06-01 NOTE — PROGRESS NOTES
ESTABLISHED PATIENT NEUROLOGY NOTE    LOCATION: [facility]  DATE OF VISIT: 2017  NAME: Ms.Dawn Mcbride  : 1985 (32 year old)  MR #: [id number]    REFERRING/PRIMARY CARE PROVIDER: Marcos Reyes PA    REASON FOR VISIT: MRI thoracic spine results      HISTORY OF PRESENT ILLNESS: 32 year old woman with symptoms of lightheadedness and headache. It was felt that she has vertiginous migraine. She had brisk tendon reflexes and therefore MRI the thoracic spine study was done. It shows changes related to syrinx in the lower cervical cord. The radiologist suggested MRI cervical spine to evaluate it further. Her anxiety has significantly increased on reviewing this report. She needs a lot of reassurance indicating that we'll try to figure out the extent of her syrinx and will also arrange for her to have the MRI of the brain with it. Her concerns for brain tumor still persist.    She took verapamil only for a few days and discontinued indicating that it did not help her with the dizziness/lightheadedness part. The purpose of giving verapamil was to use it as a prophylactic agent for her vertiginous migraine. And moreover it would take time before we could see the results.. Interestingly she took Imitrex 25 mg and it tends to give her some relief to her acute headache.    She had low vitamin D level and apparently took course of loading dose of vitamin D but was not taking maintenance dose but she kept indicating that vitamin D is present in her multivitamin tablet. Explained to her vitamin D in multivitamin tablet is not sufficient for replacement therapy.    CURRENT MEDICATIONS:   Current Outpatient Prescriptions on File Prior to Visit:  SUMAtriptan (IMITREX) 25 MG tablet Take 1 tablet (25 mg) by mouth at onset of headache for migraine   verapamil (CALAN) 40 MG tablet Week 1 & 2: 40 mg at bed time. Week 3 & 4: 40 mg two times a day. Week 5 and afterwards: 40 mg three times a day. (Patient not  "taking: Reported on 5/31/2017)     No current facility-administered medications on file prior to visit.   PAST MEDICAL HISTORY:   Past Medical History:   Diagnosis Date     Vestibular migraine      Vitamin D deficiency     Taking multivitamin everyday     Past Surgical, Personal and Social history reviewed & documented in [facility].    GENERAL EXAMINATION:  /72 (BP Location: Right arm, Patient Position: Chair, Cuff Size: Adult Regular)  Pulse 91  Ht 1.619 m (5' 3.75\")  Wt 53.7 kg (118 lb 6.4 oz)  BMI 20.48 kg/m2    IMPRESSION:   Encounter Diagnoses   Name Primary?     Vertiginous migraine Yes     Syrinx of spinal cord (H)      Vitamin D deficiency      Deep tendon reflex increase      COMMENTS: Vertiginous migraine with the syrinx of spinal cord. Needs further evaluation regarding syrinx and proper treatment for her migraine. Needs a lot of reassurance for compliance. She is receptive but she will need additional sessions. Encouraged to bring her fiance next time for follow-up appointment. After complete workup if anxiety remains a problem, would suggest Marcos Reyes PA to consider start treating her on anti-anxiolytic medications. At the same time encouraged her to start taking maintenance dose of vitamin D and as you know vitamin D deficiency can contribute to multiple symptoms starting from anxiety, stress and depression to risk for neurodegenerative diseases in order age    PLANS:   Patient Instructions   AFTER VISIT SUMMARY (AVS)  Signed Prescriptions:                        Disp   Refills    Cholecalciferol (VITAMIN D) 2000 UNITS tab*90 tab*3        Sig: Take 2,000 Units by mouth daily  Authorizing Provider: BAILEY DAVID    Advised to start taking Verapamil as suggested before    Diagnostic possibilities reviewed    Preventive Neurology: Encouraged to keep physically and mentally active with particular emphasis on daily stretching exercises, walking, and healthy eating.    Additional  " recommendations after the work-up    To call us for follow-up appointment after the work-up    Thanks     [facility]: [phone]    Thanks to Marcos Reyes PA  for allowing me to participate in Ms. Mcbride's care. Please feel free to call me with any questions or concerns.     Total Time: 40 minutes. More than 50% of the time spent on counseling/coordinating the care.    Sachi Parker MD, MRCPI  Neurologist    Cc: Marcos Reyes PA

## 2017-06-24 ENCOUNTER — HEALTH MAINTENANCE LETTER (OUTPATIENT)
Age: 32
End: 2017-06-24

## 2017-11-13 LAB
ABO + RH BLD: NORMAL
ABO + RH BLD: NORMAL
BLD GP AB SCN SERPL QL: NEGATIVE
CULTURE MICRO: NORMAL
HBV SURFACE AG SERPL QL IA: NEGATIVE
HEMOGLOBIN: 13.4 G/DL (ref 11.7–15.7)
HIV 1+2 AB+HIV1 P24 AG SERPL QL IA: NON REACTIVE
PLATELET # BLD AUTO: 219 10^9/L
RUBELLA ABY IGG: NORMAL
RUBELLA ANTIBODY IGG QUANTITATIVE: NORMAL IU/ML
T PALLIDUM IGG SER QL: NEGATIVE

## 2017-12-04 ENCOUNTER — TRANSFERRED RECORDS (OUTPATIENT)
Dept: HEALTH INFORMATION MANAGEMENT | Facility: CLINIC | Age: 32
End: 2017-12-04

## 2017-12-04 LAB — C TRACH DNA SPEC QL PROBE+SIG AMP: NEGATIVE

## 2018-02-15 ENCOUNTER — TELEPHONE (OUTPATIENT)
Dept: OBGYN | Facility: CLINIC | Age: 33
End: 2018-02-15

## 2018-02-15 NOTE — TELEPHONE ENCOUNTER
Attempted to reach Enid that she needs to contact her previous clinic to have them faxed to [facility],but her mailbox was full so couldn't leave a vm.

## 2018-02-15 NOTE — TELEPHONE ENCOUNTER
----- Message from Deondre Wasnick sent at 2/15/2018 10:11 AM CST -----  Regarding: PT is requesting the clinic to request her records from [facility]   Contact: [phone]  PT is stating that the clinic needs to contact [facility] to request her records.  PT has an appointment on 3/2/18.  The phone number to [facility] in [place] is [phone].  PT can be reached at [phone] with any questions.    Thank you,  Deondre    Call Center

## 2018-02-18 ENCOUNTER — OFFICE VISIT (OUTPATIENT)
Dept: URGENT CARE | Facility: URGENT CARE | Age: 33
End: 2018-02-18

## 2018-02-18 VITALS
HEIGHT: 64 IN | OXYGEN SATURATION: 100 % | TEMPERATURE: 98.3 F | WEIGHT: 137.6 LBS | DIASTOLIC BLOOD PRESSURE: 61 MMHG | HEART RATE: 92 BPM | BODY MASS INDEX: 23.49 KG/M2 | SYSTOLIC BLOOD PRESSURE: 104 MMHG | RESPIRATION RATE: 20 BRPM

## 2018-02-18 DIAGNOSIS — J06.9 VIRAL URI: Primary | ICD-10-CM

## 2018-02-18 PROCEDURE — 99213 OFFICE O/P EST LOW 20 MIN: CPT | Performed by: FAMILY MEDICINE

## 2018-02-18 RX ORDER — FLUTICASONE PROPIONATE 44 UG/1
2 AEROSOL, METERED RESPIRATORY (INHALATION) 2 TIMES DAILY
Qty: 1 INHALER | Refills: 0 | Status: SHIPPED | OUTPATIENT
Start: 2018-02-18 | End: 2018-03-02

## 2018-02-18 NOTE — MR AVS SNAPSHOT
After Visit Summary   2/18/2018    Enid Mcbride    MRN: [id number]           Patient Information     Date Of Birth          1985        Visit Information        Provider Department      2/18/2018 4:40 PM Virginie Barillas MD [facility]        Today's Diagnoses     Viral URI    -  1       Follow-ups after your visit        Your next 10 appointments already scheduled     Mar 02, 2018  3:00 PM CST   Nurse Visit with [facility] Nurse   [facility] ([facility])    [place] [facility] [address]  [address]  [facility] [phone]            Mar 02, 2018  3:30 PM CST   NEW OB with Carolyn Archer MD   [facility] ([facility])    [facility] [facility] [address]  [address]  [facility] [phone]              Who to contact     If you have questions or need follow up information about today's clinic visit or your schedule please contact [facility] directly at [phone].  Normal or non-critical lab and imaging results will be communicated to you by Verivo Softwarehart, letter or phone within 4 business days after the clinic has received the results. If you do not hear from us within 7 days, please contact the clinic through Verivo Softwarehart or phone. If you have a critical or abnormal lab result, we will notify you by phone as soon as possible.  Submit refill requests through CytoVale or call your pharmacy and they will forward the refill request to us. Please allow 3 business days for your refill to be completed.          Additional Information About Your Visit        MyChart Information     CytoVale gives you secure access to your electronic health record. If you see a primary care provider, you can also send messages to your care team and make appointments. If you have questions, please call your primary care clinic.  If you do not have a primary  "care provider, please call [phone] and they will assist you.        Care EveryWhere ID     This is your Care EveryWhere ID. This could be used by other organizations to access your [place] medical records  [id number]        Your Vitals Were     Pulse Temperature Respirations Height Last Period Pulse Oximetry    92 98.3  F (36.8  C) (Oral) 20 5' 3.78\" (1.62 m) 03/14/2017 100%    Breastfeeding? BMI (Body Mass Index)                No 23.78 kg/m2           Blood Pressure from Last 3 Encounters:   02/18/18 104/61   05/31/17 105/72   04/05/17 93/63    Weight from Last 3 Encounters:   02/18/18 137 lb 9.6 oz (62.4 kg)   05/31/17 118 lb 6.4 oz (53.7 kg)   04/05/17 120 lb 9.6 oz (54.7 kg)              Today, you had the following     No orders found for display         Today's Medication Changes          These changes are accurate as of 2/18/18  5:16 PM.  If you have any questions, ask your nurse or doctor.               Start taking these medicines.        Dose/Directions    fluticasone 44 MCG/ACT Inhaler   Commonly known as:  FLOVENT HFA   Used for:  Viral URI   Started by:  Virginie Barillas MD        Dose:  2 puff   Inhale 2 puffs into the lungs 2 times daily   Quantity:  1 Inhaler   Refills:  0            Where to get your medicines      These medications were sent to [facility] [address] [place] [address] AT [place] [address], [facility] [address]     Phone:  [phone]     fluticasone 44 MCG/ACT Inhaler                Primary Care Provider Fax #    Physician No Ref-Primary [phone]       No address on file        Equal Access to Services     YANELI STEARNS AH: Fidel Weiss, walennox toro, qaybta kaalmada maida, eb irene. So [facility] [phone].    ATENCIÓN: Si habla español, tiene a prieto disposición servicios gratuitos de asistencia lingüística. [facility]al lucero [phone].    We comply with applicable " federal civil rights laws and [place] laws. We do not discriminate on the basis of race, color, national origin, age, disability, sex, sexual orientation, or gender identity.            Thank you!     Thank you for choosing [facility]  for your care. Our goal is always to provide you with excellent care. Hearing back from our patients is one way we can continue to improve our services. Please take a few minutes to complete the written survey that you may receive in the mail after your visit with us. Thank you!             Your Updated Medication List - Protect others around you: Learn how to safely use, store and throw away your medicines at www.disposemymeds.org.          This list is accurate as of 2/18/18  5:16 PM.  Always use your most recent med list.                   Brand Name Dispense Instructions for use Diagnosis    CVS PRENATAL 28-0.8 MG Tabs      Take 1 tablet by mouth        fluticasone 44 MCG/ACT Inhaler    FLOVENT HFA    1 Inhaler    Inhale 2 puffs into the lungs 2 times daily    Viral URI

## 2018-02-18 NOTE — PROGRESS NOTES
"Chief Complaint   Patient presents with     URI     1.5 week, cough, throat burning and chest pain      ROS: 10 point review of systems negative except as per HPI. Currently 21 weeks pregnant/ Baby moving well. No vaginal bleeding or abdomen cramping.    PAST MEDICAL HISTORY:  Past Medical History:   Diagnosis Date     Vestibular migraine      Vitamin D deficiency     Taking multivitamin everyday        ACTIVE MEDICAL PROBLEMS:  Patient Active Problem List   Diagnosis     Vitamin D deficiency     Vestibular migraine     Syrinx of spinal cord (H)     Deep tendon reflex increase        FAMILY HISTORY:  Family History   Problem Relation Age of Onset     Breast Cancer Maternal Grandmother      Breast Cancer Paternal Grandmother        SOCIAL HISTORY:  Social History     Social History     Marital status: Single     Spouse name: N/A     Number of children: 1     Years of education: 16     Occupational History     [employer]                                                                                -[employer]     Social History Main Topics     Smoking status: Never Smoker     Smokeless tobacco: Never Used     Alcohol use No     Drug use: No     Sexual activity: Yes     Partners: Male     Other Topics Concern     Not on file     Social History Narrative       MEDICATIONS:  Current Outpatient Prescriptions   Medication Sig Dispense Refill     Prenatal Vit-Fe Fumarate-FA (CVS PRENATAL) 28-0.8 MG TABS Take 1 tablet by mouth       fluticasone (FLOVENT HFA) 44 MCG/ACT Inhaler Inhale 2 puffs into the lungs 2 times daily 1 Inhaler 0       ALLERGIES:     Allergies   Allergen Reactions     Metronidazole          OBJECTIVE:                                                    VITALS: /61 (BP Location: Right arm, Patient Position: Chair, Cuff Size: Adult Regular)  Pulse 92  Temp 98.3  F (36.8  C) (Oral)  Resp 20  Ht 5' 3.78\" (1.62 m)  Wt 137 lb 9.6 oz (62.4 kg)  LMP 03/14/2017  " SpO2 100%  Breastfeeding? No  BMI 23.78 kg/m2  GENERAL: Pleasant, well appearing female.  HEENT: PERRL, EOMI, oropharynx normal, TMs normal, Nares normal .  NECK: supple, no thyromegaly or thyroid masses, no anterior cervical lymphadenopathy.  CV: RRR, no murmurs, rubs or gallops.  LUNGS: CTAB, normal effort.  SKIN: warm and dry without obvious rashes.   EXTREMITIES: No edema.    ASSESSMENT/PLAN:                                                    1. Viral URI  Likely viral in etiology. Discussed OTC symptomatic cares.  Follow-up if any acute shortness of breath or fevers. Can add inhaled steroid for antiinflammatory lung benefit. Symptoms not severe enough to warrant oral prednisone.  - fluticasone (FLOVENT HFA) 44 MCG/ACT Inhaler; Inhale 2 puffs into the lungs 2 times daily  Dispense: 1 Inhaler; Refill: 0      Follow-up: If not improving or if worsening

## 2018-02-18 NOTE — NURSING NOTE
"Chief Complaint   Patient presents with     URI     1.5 week, cough, throat burning and chest pain       Initial /61 (BP Location: Right arm, Patient Position: Chair, Cuff Size: Adult Regular)  Pulse 92  Temp 98.3  F (36.8  C) (Oral)  Resp 20  Ht 5' 3.78\" (1.62 m)  Wt 137 lb 9.6 oz (62.4 kg)  LMP 03/14/2017  SpO2 100%  Breastfeeding? No  BMI 23.78 kg/m2 Estimated body mass index is 23.78 kg/(m^2) as calculated from the following:    Height as of this encounter: 5' 3.78\" (1.62 m).    Weight as of this encounter: 137 lb 9.6 oz (62.4 kg).  Medication Reconciliation: completecomplete    Maggie Valerio MA      "

## 2018-02-19 ENCOUNTER — TELEPHONE (OUTPATIENT)
Dept: OBGYN | Facility: CLINIC | Age: 33
End: 2018-02-19

## 2018-02-19 NOTE — TELEPHONE ENCOUNTER
----- Message from Deondre Wasnick sent at 2/15/2018 10:11 AM CST -----  Regarding: PT is requesting the clinic to request her records from [facility]   Contact: [phone]  PT is stating that the clinic needs to contact [facility] to request her records.  PT has an appointment on 3/2/18.  The phone number to [facility] in [place] is [phone].  PT can be reached at [phone] with any questions.    Thank you,  Deondre    Call Center

## 2018-02-21 NOTE — TELEPHONE ENCOUNTER
Received records for pt transfer of care - placed in filedrawer.     Ultrasounds are in CareEverywhere.

## 2018-03-02 ENCOUNTER — OFFICE VISIT (OUTPATIENT)
Dept: OBGYN | Facility: CLINIC | Age: 33
End: 2018-03-02
Payer: COMMERCIAL

## 2018-03-02 ENCOUNTER — ALLIED HEALTH/NURSE VISIT (OUTPATIENT)
Dept: OBGYN | Facility: CLINIC | Age: 33
End: 2018-03-02
Payer: COMMERCIAL

## 2018-03-02 VITALS
WEIGHT: 139.2 LBS | SYSTOLIC BLOOD PRESSURE: 95 MMHG | HEIGHT: 63 IN | DIASTOLIC BLOOD PRESSURE: 55 MMHG | HEART RATE: 77 BPM | BODY MASS INDEX: 24.66 KG/M2

## 2018-03-02 DIAGNOSIS — Z34.90 SUPERVISION OF NORMAL PREGNANCY: Primary | ICD-10-CM

## 2018-03-02 DIAGNOSIS — Z34.02 SUPERVISION OF NORMAL FIRST PREGNANCY IN SECOND TRIMESTER: Primary | ICD-10-CM

## 2018-03-02 DIAGNOSIS — E55.9 VITAMIN D DEFICIENCY: ICD-10-CM

## 2018-03-02 PROBLEM — Z87.42 HISTORY OF ABNORMAL CERVICAL PAP SMEAR: Status: ACTIVE | Noted: 2018-03-02

## 2018-03-02 PROBLEM — Z82.2 FAMILY HISTORY OF DEAFNESS AND HEARING LOSS: Status: ACTIVE | Noted: 2018-03-02

## 2018-03-02 PROCEDURE — G0463 HOSPITAL OUTPT CLINIC VISIT: HCPCS | Mod: ZF

## 2018-03-02 RX ORDER — CHOLECALCIFEROL (VITAMIN D3) 1250 MCG
TABLET ORAL
Refills: 0 | COMMUNITY
Start: 2017-03-31 | End: 2018-03-02

## 2018-03-02 RX ORDER — SUMATRIPTAN 25 MG/1
TABLET, FILM COATED ORAL
Refills: 5 | COMMUNITY
Start: 2017-04-05 | End: 2018-03-02

## 2018-03-02 RX ORDER — VERAPAMIL HYDROCHLORIDE 40 MG/1
TABLET ORAL
Refills: 1 | COMMUNITY
Start: 2017-04-05 | End: 2018-03-02

## 2018-03-02 ASSESSMENT — PAIN SCALES - GENERAL: PAINLEVEL: NO PAIN (0)

## 2018-03-02 NOTE — MR AVS SNAPSHOT
After Visit Summary   3/2/2018    Enid Mcbride    MRN: [id number]           Patient Information     Date Of Birth          1985        Visit Information        Provider Department      3/2/2018 3:30 PM Carolyn Archer MD [facility]        Today's Diagnoses     Supervision of normal first pregnancy in second trimester    -  1    Vitamin D deficiency           Follow-ups after your visit        Follow-up notes from your care team     Return in about 4 weeks (around 3/30/2018).      Your next 10 appointments already scheduled     Apr 03, 2018  3:30 PM CDT   Return Obstetrics Extended with Eunice Galeana MD   [facility] ([facility])    [facility] [address]  [address]  [address]  [facility] [address] [phone]              Who to contact     Please call your clinic at [phone] to:    Ask questions about your health    Make or cancel appointments    Discuss your medicines    Learn about your test results    Speak to your doctor            Additional Information About Your Visit        Rovio EntertainmentharAkosha Information     Atlantium gives you secure access to your electronic health record. If you see a primary care provider, you can also send messages to your care team and make appointments. If you have questions, please call your primary care clinic.  If you do not have a primary care provider, please call [phone] and they will assist you.      Atlantium is an electronic gateway that provides easy, online access to your medical records. With Atlantium, you can request a clinic appointment, read your test results, renew a prescription or communicate with your care team.     To access your existing account, please contact your [facility] or call [phone] for assistance.        Care EveryWhere ID     This is your Care EveryWhere ID. This could be used by other organizations to access your [place]  "medical records  [id number]        Your Vitals Were     Pulse Height Last Period BMI (Body Mass Index)          77 1.6 m (5' 3\") 03/14/2017 24.66 kg/m2         Blood Pressure from Last 3 Encounters:   No data found for BP    Weight from Last 3 Encounters:   No data found for Wt              Today, you had the following     No orders found for display       Primary Care Provider Fax #    Physician No Ref-Primary [phone]       No address on file        Equal Access to Services     YANELI STEARNS : Hadii aad ku hadasho Soomaali, waaxda luqadaha, qaybta kaalmada adeegyada, waxay zuleykain musan adelolis angelrosalindaashok lajessie . So [facility] [phone].    ATENCIÓN: Si habla español, tiene a prieto disposición servicios gratuitos de asistencia lingüística. [facility] [phone].    We comply with applicable federal civil rights laws and [place] laws. We do not discriminate on the basis of race, color, national origin, age, disability, sex, sexual orientation, or gender identity.            Thank you!     Thank you for choosing [facility]  for your care. Our goal is always to provide you with excellent care. Hearing back from our patients is one way we can continue to improve our services. Please take a few minutes to complete the written survey that you may receive in the mail after your visit with us. Thank you!             Your Updated Medication List - Protect others around you: Learn how to safely use, store and throw away your medicines at www.disposemymeds.org.          This list is accurate as of 3/2/18 11:59 PM.  Always use your most recent med list.                   Brand Name Dispense Instructions for use Diagnosis    CVS PRENATAL 28-0.8 MG Tabs      Take 1 tablet by mouth          "

## 2018-04-03 ENCOUNTER — OFFICE VISIT (OUTPATIENT)
Dept: OBGYN | Facility: CLINIC | Age: 33
End: 2018-04-03
Payer: COMMERCIAL

## 2018-04-03 VITALS
BODY MASS INDEX: 24.59 KG/M2 | WEIGHT: 144 LBS | HEART RATE: 114 BPM | SYSTOLIC BLOOD PRESSURE: 102 MMHG | DIASTOLIC BLOOD PRESSURE: 69 MMHG | HEIGHT: 64 IN

## 2018-04-03 DIAGNOSIS — Z34.80 PRENATAL CARE OF MULTIGRAVIDA, ANTEPARTUM: Primary | ICD-10-CM

## 2018-04-03 LAB
ERYTHROCYTE [DISTWIDTH] IN BLOOD BY AUTOMATED COUNT: 12.4 % (ref 10–15)
GLUCOSE 1H P 50 G GLC PO SERPL-MCNC: 127 MG/DL (ref 60–129)
HCT VFR BLD AUTO: 34.7 % (ref 35–47)
HGB BLD-MCNC: 11.5 G/DL (ref 11.7–15.7)
MCH RBC QN AUTO: 30.1 PG (ref 26.5–33)
MCHC RBC AUTO-ENTMCNC: 33.1 G/DL (ref 31.5–36.5)
MCV RBC AUTO: 91 FL (ref 78–100)
PLATELET # BLD AUTO: 173 10E9/L (ref 150–450)
RBC # BLD AUTO: 3.82 10E12/L (ref 3.8–5.2)
WBC # BLD AUTO: 6.3 10E9/L (ref 4–11)

## 2018-04-03 PROCEDURE — 82306 VITAMIN D 25 HYDROXY: CPT | Performed by: OBSTETRICS & GYNECOLOGY

## 2018-04-03 PROCEDURE — 82950 GLUCOSE TEST: CPT | Performed by: OBSTETRICS & GYNECOLOGY

## 2018-04-03 PROCEDURE — 85027 COMPLETE CBC AUTOMATED: CPT | Performed by: OBSTETRICS & GYNECOLOGY

## 2018-04-03 PROCEDURE — G0463 HOSPITAL OUTPT CLINIC VISIT: HCPCS | Mod: ZF

## 2018-04-03 PROCEDURE — 40000809 ZZH STATISTIC NO DOCUMENTATION TO SUPPORT CHARGE

## 2018-04-03 PROCEDURE — 86780 TREPONEMA PALLIDUM: CPT | Performed by: OBSTETRICS & GYNECOLOGY

## 2018-04-03 PROCEDURE — 36415 COLL VENOUS BLD VENIPUNCTURE: CPT | Performed by: OBSTETRICS & GYNECOLOGY

## 2018-04-03 ASSESSMENT — ANXIETY QUESTIONNAIRES
5. BEING SO RESTLESS THAT IT IS HARD TO SIT STILL: NOT AT ALL
7. FEELING AFRAID AS IF SOMETHING AWFUL MIGHT HAPPEN: NOT AT ALL
1. FEELING NERVOUS, ANXIOUS, OR ON EDGE: SEVERAL DAYS
6. BECOMING EASILY ANNOYED OR IRRITABLE: SEVERAL DAYS
3. WORRYING TOO MUCH ABOUT DIFFERENT THINGS: SEVERAL DAYS
5. BEING SO RESTLESS THAT IT IS HARD TO SIT STILL: NOT AT ALL
7. FEELING AFRAID AS IF SOMETHING AWFUL MIGHT HAPPEN: NOT AT ALL
GAD7 TOTAL SCORE: 3
3. WORRYING TOO MUCH ABOUT DIFFERENT THINGS: SEVERAL DAYS
6. BECOMING EASILY ANNOYED OR IRRITABLE: SEVERAL DAYS
GAD7 TOTAL SCORE: 3
1. FEELING NERVOUS, ANXIOUS, OR ON EDGE: SEVERAL DAYS
2. NOT BEING ABLE TO STOP OR CONTROL WORRYING: NOT AT ALL
2. NOT BEING ABLE TO STOP OR CONTROL WORRYING: NOT AT ALL

## 2018-04-03 ASSESSMENT — PATIENT HEALTH QUESTIONNAIRE - PHQ9
5. POOR APPETITE OR OVEREATING: NOT AT ALL
5. POOR APPETITE OR OVEREATING: NOT AT ALL

## 2018-04-03 ASSESSMENT — PAIN SCALES - GENERAL: PAINLEVEL: NO PAIN (0)

## 2018-04-03 NOTE — NURSING NOTE
Chief Complaint   Patient presents with     Prenatal Care     PRECIOUS 27 weeks and 4 days   Starla Reid LPN

## 2018-04-03 NOTE — MR AVS SNAPSHOT
After Visit Summary   4/3/2018    Enid Mcbride    MRN: [id number]           Patient Information     Date Of Birth          1985        Visit Information        Provider Department      4/3/2018 3:30 PM Eunice Galeana MD [facility]        Today's Diagnoses     Prenatal care of multigravida, antepartum    -  1      Care Instructions    Call the clinic if any of the following: contractions, vaginal bleeding, loss of fluid, or decreased fetal movement.     Make OB appointment in 4 weeks     We will let you know of your lab results via EarDish             Follow-ups after your visit        Follow-up notes from your care team     Return in about 4 weeks (around 5/1/2018) for PRECIOUS w/Tdap.      Who to contact     Please call your clinic at [phone] to:    Ask questions about your health    Make or cancel appointments    Discuss your medicines    Learn about your test results    Speak to your doctor            Additional Information About Your Visit        ZEBharViepage Information     EarDish gives you secure access to your electronic health record. If you see a primary care provider, you can also send messages to your care team and make appointments. If you have questions, please call your primary care clinic.  If you do not have a primary care provider, please call [phone] and they will assist you.      EarDish is an electronic gateway that provides easy, online access to your medical records. With EarDish, you can request a clinic appointment, read your test results, renew a prescription or communicate with your care team.     To access your existing account, please contact your [facility] or call [phone] for assistance.        Care EveryWhere ID     This is your Care EveryWhere ID. This could be used by other organizations to access your [place] medical records  [id number]        Your Vitals Were     Pulse Height Last Period  "Breastfeeding? BMI (Body Mass Index)       114 1.619 m (5' 3.75\") 03/14/2017 No 24.91 kg/m2        Blood Pressure from Last 3 Encounters:   04/03/18 102/69   03/02/18 95/55   02/18/18 104/61    Weight from Last 3 Encounters:   04/03/18 65.3 kg (144 lb)   03/02/18 63.1 kg (139 lb 3.2 oz)   02/18/18 62.4 kg (137 lb 9.6 oz)              We Performed the Following     25- OH-Vitamin D     Anti Treponema     CBC with Platelets     Glucose 1 Hour        Primary Care Provider Fax #    Physician No Ref-Primary [phone]       No address on file        Equal Access to Services     YANELI STEARNS : Fidel Weiss, ara toro, adamaris bey, eb irene. So [facility] [phone].    ATENCIÓN: Si habla español, tiene a prieto disposición servicios gratuitos de asistencia lingüística. Llame al [phone].    We comply with applicable federal civil rights laws and [place] laws. We do not discriminate on the basis of race, color, national origin, age, disability, sex, sexual orientation, or gender identity.            Thank you!     Thank you for choosing [facility]  for your care. Our goal is always to provide you with excellent care. Hearing back from our patients is one way we can continue to improve our services. Please take a few minutes to complete the written survey that you may receive in the mail after your visit with us. Thank you!             Your Updated Medication List - Protect others around you: Learn how to safely use, store and throw away your medicines at www.disposemymeds.org.          This list is accurate as of 4/3/18 11:59 PM.  Always use your most recent med list.                   Brand Name Dispense Instructions for use Diagnosis    CVS PRENATAL 28-0.8 MG Tabs      Take 1 tablet by mouth          "

## 2018-04-03 NOTE — PATIENT INSTRUCTIONS
Call the clinic if any of the following: contractions, vaginal bleeding, loss of fluid, or decreased fetal movement.     Make OB appointment in 4 weeks     We will let you know of your lab results via [employer]

## 2018-04-03 NOTE — PROGRESS NOTES
"[facility]  Return OB Visit    S: Enid is doing well today.  Reports she is feeling well, however, has been having increased pelvic pressure for the past couple of weeks (although has improved today).  Denies any contractions, VB, changes in discharge, vaginal itching/burning or urinary symptoms. Patient denies any headache, vision changes, shortness of breath, chest pain or RUQ/epigastric abdominal pain.  Good FM.     Pregnancy complications:   - History of infant with cleft palate   - History of abnormal pap smears and CIN1 on colposcopy, NIL 2016     O: /69  Pulse 114  Ht 1.619 m (5' 3.75\")  Wt 65.3 kg (144 lb)  LMP 2017  Breastfeeding? No  BMI 24.91 kg/m2    Gen: Pleasant, NAD   CV: Regular rate   Resp: Non-labored breathing   Psych: Good eye contact, bright affect   Neuro: A&O x3    Cervix: C/L/H    Fundal Height:  28cm  FHR: 138    A/P:  Enid Mcbride is a 32 year old  at 27w4d by 7w3d US, here for return OB visit.  Pregnancy is complicated by remote history of abnormal pap smears and infant with cleft palate.    PNC:   - Rh positive, rubella immune.  Prenatal labs UTD.  - Declined flu vaccine, Tdap at next visit.   - Genetics: declined, normal anatomy ultrasound in CareEverywhere. No evidence of cleft palate/lip, as daughter had cleft palate.   - Reviewed EOB folder today; labs today including CBC, RPR, GCT and Vit-D  - Mirena IUD, breastfeeding     Follow up in 4 weeks for PRECIOUS visit.    Eunice Cazares MD   OB/Gyn Resident, PGY-4  April 3, 2018, 4:05 PM        [facility] Staff Note  Patient was seen by the resident in [facility] of Care Clinic.  I reviewed the history & exam.  The patient's assessment and plan were made jointly.    Ny Ramachandran MD   4/3/18      "

## 2018-04-04 LAB
DEPRECATED CALCIDIOL+CALCIFEROL SERPL-MC: 34 UG/L (ref 20–75)
T PALLIDUM IGG+IGM SER QL: NEGATIVE

## 2018-04-04 ASSESSMENT — PATIENT HEALTH QUESTIONNAIRE - PHQ9: SUM OF ALL RESPONSES TO PHQ QUESTIONS 1-9: 2

## 2018-04-04 ASSESSMENT — ANXIETY QUESTIONNAIRES: GAD7 TOTAL SCORE: 3

## 2018-05-01 ENCOUNTER — TELEPHONE (OUTPATIENT)
Dept: OBGYN | Facility: CLINIC | Age: 33
End: 2018-05-01

## 2018-05-02 NOTE — TELEPHONE ENCOUNTER
34 yo  at 31w4d calls with feeling some tightness.  Wonders if [place] contractions.  Not painful, but noticeable.  Patient denies LOF or VB.  Today felt like a normal day, no extra activity, has noted normal, good fetal movement all day.    Advise patient rest, hydrate and have a small meal.  Count contractions in the next hour and if more than 6 to present to triage or call back.  If LOF, VB, or decreased FM patient agrees to call back as well.    Kierra Garcia MD

## 2018-05-03 ENCOUNTER — OFFICE VISIT (OUTPATIENT)
Dept: OBGYN | Facility: CLINIC | Age: 33
End: 2018-05-03
Attending: MIDWIFE
Payer: COMMERCIAL

## 2018-05-03 VITALS
DIASTOLIC BLOOD PRESSURE: 73 MMHG | HEART RATE: 89 BPM | WEIGHT: 148 LBS | SYSTOLIC BLOOD PRESSURE: 103 MMHG | BODY MASS INDEX: 25.6 KG/M2

## 2018-05-03 DIAGNOSIS — F41.9 ANXIETY: ICD-10-CM

## 2018-05-03 DIAGNOSIS — Z34.80 PRENATAL CARE OF MULTIGRAVIDA, ANTEPARTUM: Primary | ICD-10-CM

## 2018-05-03 PROBLEM — Z34.83 ENCOUNTER FOR SUPERVISION OF OTHER NORMAL PREGNANCY, THIRD TRIMESTER: Status: ACTIVE | Noted: 2018-05-03

## 2018-05-03 NOTE — MR AVS SNAPSHOT
After Visit Summary   5/3/2018    Enid Mcbride    MRN: [id number]           Patient Information     Date Of Birth          1985        Visit Information        Provider Department      5/3/2018 8:15 AM Diana Arriaga APRN CNM [facility]        Today's Diagnoses     Prenatal care of multigravida, antepartum    -  1    Anxiety           Follow-ups after your visit        Follow-up notes from your care team     Return in about 2 weeks (around 5/17/2018) for PRECIOUS.      Your next 10 appointments already scheduled     May 18, 2018  8:30 AM CDT   RETURN OB with NIRAV Alva CNM   [facility] ([facility])    [place] [facility] [address]  [address]  [address]  [facility] [id number] [phone]            Jun 01, 2018  8:15 AM CDT   RETURN OB with NIRAV Napoles CNM   [facility] ([facility])    [facility] [address]  [address]  [facility] [phone]            Jun 08, 2018  8:15 AM CDT   RETURN OB with NIRAV Alva CNM   [facility] ([facility])    [facility] [address]  [address]  [address]  [facility] [phone]              Who to contact     Please call your clinic at [phone] to:    Ask questions about your health    Make or cancel appointments    Discuss your medicines    Learn about your test results    Speak to your doctor            Additional Information About Your Visit        Cate Information     Cate gives you secure access to your electronic health record. If you see a primary care provider, you can also send messages to your care team and make appointments. If you have questions, please call your primary care clinic.  If you do not have a primary care provider, please call [phone] and they will assist you.      Cate is an  electronic gateway that provides easy, online access to your medical records. With [employer], you can request a clinic appointment, read your test results, renew a prescription or communicate with your care team.     To access your existing account, please contact your [facility] or call [phone] for assistance.        Care EveryWhere ID     This is your Care EveryWhere ID. This could be used by other organizations to access your [place] medical records  [id number]        Your Vitals Were     Pulse Last Period BMI (Body Mass Index)             89 03/14/2017 25.6 kg/m2          Blood Pressure from Last 3 Encounters:   05/03/18 103/73   04/03/18 102/69   03/02/18 95/55    Weight from Last 3 Encounters:   05/03/18 67.1 kg (148 lb)   04/03/18 65.3 kg (144 lb)   03/02/18 63.1 kg (139 lb 3.2 oz)              Today, you had the following     No orders found for display       Primary Care Provider Fax #    Physician No Ref-Primary [phone]       No address on file        Equal Access to Services     BRANDI [facility] GulfportALONDRA : Hadii aad ku hadasho Soblaine, waaxda luqadaha, qaybta kaalmada adelolisyagiovanny, eb honeycutt . So [facility] [phone].    ATENCIÓN: Si habla español, tiene a prieto disposición servicios gratuitos de asistencia lingüística. Llame al [phone].    We comply with applicable federal civil rights laws and [place] laws. We do not discriminate on the basis of race, color, national origin, age, disability, sex, sexual orientation, or gender identity.            Thank you!     Thank you for choosing [facility]  for your care. Our goal is always to provide you with excellent care. Hearing back from our patients is one way we can continue to improve our services. Please take a few minutes to complete the written survey that you may receive in the mail after your visit with us. Thank you!             Your Updated Medication List - Protect  others around you: Learn how to safely use, store and throw away your medicines at www.disposemymeds.org.          This list is accurate as of 5/3/18  4:21 PM.  Always use your most recent med list.                   Brand Name Dispense Instructions for use Diagnosis    CVS PRENATAL 28-0.8 MG Tabs      Take 1 tablet by mouth

## 2018-05-03 NOTE — PROGRESS NOTES
Subjective:      33 year old  at 31w6d presentst for a routine prenatal appointment.    no vaginal bleeding or leakage of fluid.  Rare  contractions. Good  fetal movement.       No HA, visual changes, RUQ or epigastric pain.   Patient concerns: reji is noting increased anxiety in 3rd trim.  Has former therapist she can make an appt.  Has not been on med for years and declines currently  Usually does yoga has not started during pregnancy.  Recently . Have 6 yo daughter and 6 yo step daughter (lives with them parttime.) spouse recently adopted her 6 yo daughter.  Hx unexpected outcome. Cleft palate dx 24 h after first birth. No problems prior  Was a surprise and stressful meeting surgeons etc. Daughter has done well. Working FT. Has 12 wk leave PP  Had to change OB providers MD she knew for 8 yrs.  Happy at [facility] but a lot of change.    Feeling well overall.  Reviewed EOB labs with patient. WNL.   Reviewed TDAP Consents today  Objective:  Vitals:    18 0822   BP: 103/73   Pulse: 89   Weight: 67.1 kg (148 lb)   , see ob flowsheet  Assessment/Plan   Supervision of other normal pregnancy   ABO   Date Value Ref Range Status   2017 O  Final     RH(D)   Date Value Ref Range Status   2017 Pos  Final     Antibody Screen   Date Value Ref Range Status   2017 negative  Final   , Rhogam  was notgiven.    - Reviewed total weight gain, encouraged continued healthy diet and exercise.  .  Reviewed importance of daily fetal kick count and why/how to contact provider.    - Reviewed why/how to contact provider if headache/visual changes/RUQ or epigastric pain, decreased fetal movement, vaginal bleeding, leakage of fluid or more than 4 contractions in an hour.     Patient education/orders or handouts today:  PTL signs/symptoms  Reviewed GBS screening at 35-36 wks.    Return to clinic in 2 weeks and prn if questions or concerns.     NIRAV Griffin CNM

## 2018-05-03 NOTE — LETTER
Date:May 4, 2018      Patient was self referred, no letter generated. Do not send.        [facility] Health Information

## 2018-05-03 NOTE — LETTER
5/3/2018       RE: Enid Mcbride  [address] [place] [address]     Dear Colleague,    Thank you for referring your patient, Enid Mcbride, to the [facility] at [facility]. Please see a copy of my visit note below.    Subjective:      33 year old  at 31w6d presentst for a routine prenatal appointment.    no vaginal bleeding or leakage of fluid.  Rare  contractions. Good  fetal movement.       No HA, visual changes, RUQ or epigastric pain.   Patient concerns: enid is noting increased anxiety in 3rd trim.  Has former therapist she can make an appt.  Has not been on med for years and declines currently  Usually does yoga has not started during pregnancy.  Recently . Have 6 yo daughter and 6 yo step daughter (lives with them parttime.) spouse recently adopted her 6 yo daughter.  Hx unexpected outcome. Cleft palate dx 24 h after first birth. No problems prior  Was a surprise and stressful meeting surgeons etc. Daughter has done well. Working FT. Has 12 wk leave PP  Had to change OB providers MD she knew for 8 yrs.  Happy at [facility] but a lot of change.    Feeling well overall.  Reviewed EOB labs with patient. WNL.   Reviewed TDAP Consents today  Objective:  Vitals:    18 0822   BP: 103/73   Pulse: 89   Weight: 67.1 kg (148 lb)   , see ob flowsheet  Assessment/Plan   Supervision of other normal pregnancy   ABO   Date Value Ref Range Status   2017 O  Final     RH(D)   Date Value Ref Range Status   2017 Pos  Final     Antibody Screen   Date Value Ref Range Status   2017 negative  Final   , Rhogam  was notgiven.    - Reviewed total weight gain, encouraged continued healthy diet and exercise.  .  Reviewed importance of daily fetal kick count and why/how to contact provider.    - Reviewed why/how to contact provider if headache/visual changes/RUQ or epigastric pain, decreased fetal movement, vaginal bleeding, leakage of  fluid or more than 4 contractions in an hour.     Patient education/orders or handouts today:  PTL signs/symptoms  Reviewed GBS screening at 35-36 wks.    Return to clinic in 2 weeks and prn if questions or concerns.     NIRAV Griffin CNM        Again, thank you for allowing me to participate in the care of your patient.      Sincerely,    NIRAV Griffin CNM

## 2018-05-10 PROBLEM — Z82.79 FAMILY HISTORY OF CLEFT LIP: Status: ACTIVE | Noted: 2018-05-10

## 2018-05-14 ENCOUNTER — TELEPHONE (OUTPATIENT)
Dept: OBGYN | Facility: CLINIC | Age: 33
End: 2018-05-14

## 2018-05-14 NOTE — TELEPHONE ENCOUNTER
Enid called to report new trace amount light pink vaginal spotting. Pt states she called on Saturday to report s/sx yeast infection, spoke with midwife who advised OCT monistat. Since use of this, symptoms of itching have subsided. Pt endorses application of monistat is painful/irritates vaginal tissue    Denies LOF, pain/cramping, urinary symptoms, contractions. Reports +FM.    Discussed likely irritation from applicator causing light spotting.    Spoke with midwife in clinic who advised pt OK to monitor at home until PRECIOUS on Friday unless any changes in FM or if develops cramping pain.    Informed pt of this recommendation and also advised to call if develops heavier/bright red bleeding or any other concerns and we can schedule her for an earlier appointment.    Patient expressed understanding and has no further questions at this time

## 2018-05-18 ENCOUNTER — OFFICE VISIT (OUTPATIENT)
Dept: OBGYN | Facility: CLINIC | Age: 33
End: 2018-05-18
Attending: ADVANCED PRACTICE MIDWIFE
Payer: COMMERCIAL

## 2018-05-18 VITALS
SYSTOLIC BLOOD PRESSURE: 92 MMHG | DIASTOLIC BLOOD PRESSURE: 60 MMHG | WEIGHT: 149.4 LBS | HEIGHT: 63 IN | HEART RATE: 75 BPM | BODY MASS INDEX: 26.47 KG/M2

## 2018-05-18 DIAGNOSIS — Z23 NEED FOR TDAP VACCINATION: ICD-10-CM

## 2018-05-18 DIAGNOSIS — Z34.93 NORMAL PREGNANCY IN THIRD TRIMESTER: Primary | ICD-10-CM

## 2018-05-18 PROCEDURE — 90471 IMMUNIZATION ADMIN: CPT | Mod: ZF

## 2018-05-18 PROCEDURE — G0463 HOSPITAL OUTPT CLINIC VISIT: HCPCS | Mod: ZF

## 2018-05-18 PROCEDURE — 25000128 H RX IP 250 OP 636: Mod: ZF

## 2018-05-18 PROCEDURE — 90715 TDAP VACCINE 7 YRS/> IM: CPT | Mod: ZF

## 2018-05-18 NOTE — MR AVS SNAPSHOT
After Visit Summary   5/18/2018    Enid Mcbride    MRN: [id number]           Patient Information     Date Of Birth          1985        Visit Information        Provider Department      5/18/2018 8:30 AM Earline Velasco APRN CNM [facility]        Today's Diagnoses     Normal pregnancy in third trimester    -  1    Need for Tdap vaccination           Follow-ups after your visit        Follow-up notes from your care team     Return in about 2 weeks (around 6/1/2018) for PRECIOUS.      Your next 10 appointments already scheduled     Jun 01, 2018  8:15 AM CDT   RETURN OB with NIRAV Napoles CNM   [facility] ([facility])    [facility] [address]  [address]  [facility] [address] [phone]            Jun 08, 2018  8:15 AM CDT   RETURN OB with NIRAV Alva CNM   [facility] ([facility])    [facility] [address]  [address] [facility] [address] [phone]              Who to contact     Please call your clinic at [phone] to:    Ask questions about your health    Make or cancel appointments    Discuss your medicines    Learn about your test results    Speak to your doctor            Additional Information About Your Visit        MyChart Information     [employer] gives you secure access to your electronic health record. If you see a primary care provider, you can also send messages to your care team and make appointments. If you have questions, please call your primary care clinic.  If you do not have a primary care provider, please call [phone] and they will assist you.      P[employer]ge is an electronic gateway that provides easy, online access to your medical records. With [employer], you can request a clinic appointment, read your test results, renew a prescription or communicate with your care team.     To access your  "existing account, please contact your [facility] or call [phone] for assistance.        Care EveryWhere ID     This is your Care EveryWhere ID. This could be used by other organizations to access your [place] medical records  [id number]        Your Vitals Were     Pulse Height Last Period BMI (Body Mass Index)          75 1.6 m (5' 3\") 03/14/2017 26.47 kg/m2         Blood Pressure from Last 3 Encounters:   05/18/18 92/60   05/03/18 103/73   04/03/18 102/69    Weight from Last 3 Encounters:   05/18/18 67.8 kg (149 lb 6.4 oz)   05/03/18 67.1 kg (148 lb)   04/03/18 65.3 kg (144 lb)              We Performed the Following     TDAP VACCINE (BOOSTRIX)        Primary Care Provider Fax #    Physician No Ref-Primary [phone]       No address on file        Equal Access to Services     YANELI STEARNS : Hadii neftaly rooto Abhishek, waaxda lumarisa, qaybta kaalmada ademerly, eb honeycutt . So [facility] [phone].    ATENCIÓN: Si habla español, tiene a prieto disposición servicios gratuitos de asistencia lingüística. Annieame al [phone].    We comply with applicable federal civil rights laws and [place] laws. We do not discriminate on the basis of race, color, national origin, age, disability, sex, sexual orientation, or gender identity.            Thank you!     Thank you for choosing [facility]  for your care. Our goal is always to provide you with excellent care. Hearing back from our patients is one way we can continue to improve our services. Please take a few minutes to complete the written survey that you may receive in the mail after your visit with us. Thank you!             Your Updated Medication List - Protect others around you: Learn how to safely use, store and throw away your medicines at www.disposemymeds.org.          This list is accurate as of 5/18/18  9:13 AM.  Always use your most recent med list.                   " Brand Name Dispense Instructions for use Diagnosis    [employer] PRENATAL 28-0.8 MG Tabs      Take 1 tablet by mouth

## 2018-05-18 NOTE — PROGRESS NOTES
"Subjective:     33 year old  at 34w0d presents for routine prenatal visit.            Denies vaginal bleeding or leakage of fluid.  Reports irregular contractions.  Reports regular fetal movement.        No HA, visual changes, RUQ or epigastric pain.   Patient concerns: Continues to feel increased pelvic pressure, some SOB with activity and occasionally at rest.  Feeling well overall.  Objective:  Vitals:    18 0841   BP: 92/60   BP Location: Left arm   Patient Position: Chair   Pulse: 75   Weight: 67.8 kg (149 lb 6.4 oz)   Height: 1.6 m (5' 3\")    See OB flowsheet  Assessment/Plan     Encounter Diagnosis   Name Primary?     Normal pregnancy in third trimester Yes     - Reviewed why/how to contact provider if headache/visual changes/RUQ or epigastric pain, decreased fetal movement, vaginal bleeding, leakage of fluid or strong/regular contractions.   Patient education/orders or handouts today:  Sign/symptoms of labor  Discussed GBS and CBC for next visit  Return to clinic in 2 week and prn if questions or concerns.   NIRAV NapolesM    "

## 2018-05-18 NOTE — LETTER
"2018       RE: Enid Mcbride  [address] [place] [address]     Dear Colleague,    Thank you for referring your patient, Enid Mcbride, to the [facility] at [facility]. Please see a copy of my visit note below.    Subjective:     33 year old  at 34w0d presents for routine prenatal visit.            Denies vaginal bleeding or leakage of fluid.  Reports irregular contractions.  Reports regular fetal movement.        No HA, visual changes, RUQ or epigastric pain.   Patient concerns: Continues to feel increased pelvic pressure, some SOB with activity and occasionally at rest.  Feeling well overall.  Objective:  Vitals:    18 0841   BP: 92/60   BP Location: Left arm   Patient Position: Chair   Pulse: 75   Weight: 67.8 kg (149 lb 6.4 oz)   Height: 1.6 m (5' 3\")    See OB flowsheet  Assessment/Plan     Encounter Diagnosis   Name Primary?     Normal pregnancy in third trimester Yes     - Reviewed why/how to contact provider if headache/visual changes/RUQ or epigastric pain, decreased fetal movement, vaginal bleeding, leakage of fluid or strong/regular contractions.   Patient education/orders or handouts today:  Sign/symptoms of labor  Discussed GBS and CBC for next visit  Return to clinic in 2 week and prn if questions or concerns.   NIRAV Napoles CNM      Again, thank you for allowing me to participate in the care of your patient.      Sincerely,    NIRAV Napoles CNM      "

## 2018-05-18 NOTE — LETTER
Date:May 21, 2018      Patient was self referred, no letter generated. Do not send.        [facility] Health Information

## 2018-05-24 ENCOUNTER — TELEPHONE (OUTPATIENT)
Dept: LAB | Facility: CLINIC | Age: 33
End: 2018-05-24

## 2018-05-24 NOTE — TELEPHONE ENCOUNTER
Spoke to Enid  with BRITTANIE 18,34 6/7 wks today who was experiencing what she thinks where a lot of robb hick contractions yesterday. She is at work now, desk job.She denies any s/s UTI. She feels comfortable being able to evaluate s/s while at work. Instructed to empty bladder, iIncrease her water intake( 3 full glasses) and keeping track of the timing of the contractions. If she has more than 4-6 contraction in an hour asked her to call back.Pt indicated understanding and agreed with plan.

## 2018-06-01 ENCOUNTER — OFFICE VISIT (OUTPATIENT)
Dept: OBGYN | Facility: CLINIC | Age: 33
End: 2018-06-01
Attending: ADVANCED PRACTICE MIDWIFE
Payer: COMMERCIAL

## 2018-06-01 VITALS
HEART RATE: 87 BPM | SYSTOLIC BLOOD PRESSURE: 100 MMHG | DIASTOLIC BLOOD PRESSURE: 66 MMHG | HEIGHT: 63 IN | BODY MASS INDEX: 26.49 KG/M2 | WEIGHT: 149.5 LBS

## 2018-06-01 DIAGNOSIS — O92.70 LACTATION DISORDER: ICD-10-CM

## 2018-06-01 DIAGNOSIS — Z34.93 NORMAL PREGNANCY IN THIRD TRIMESTER: Primary | ICD-10-CM

## 2018-06-01 LAB
ERYTHROCYTE [DISTWIDTH] IN BLOOD BY AUTOMATED COUNT: 13.3 % (ref 10–15)
HCT VFR BLD AUTO: 36.9 % (ref 35–47)
HGB BLD-MCNC: 11.9 G/DL (ref 11.7–15.7)
MCH RBC QN AUTO: 29.2 PG (ref 26.5–33)
MCHC RBC AUTO-ENTMCNC: 32.2 G/DL (ref 31.5–36.5)
MCV RBC AUTO: 90 FL (ref 78–100)
PLATELET # BLD AUTO: 177 10E9/L (ref 150–450)
RBC # BLD AUTO: 4.08 10E12/L (ref 3.8–5.2)
WBC # BLD AUTO: 6.5 10E9/L (ref 4–11)

## 2018-06-01 PROCEDURE — 36415 COLL VENOUS BLD VENIPUNCTURE: CPT | Performed by: ADVANCED PRACTICE MIDWIFE

## 2018-06-01 PROCEDURE — 85027 COMPLETE CBC AUTOMATED: CPT | Performed by: ADVANCED PRACTICE MIDWIFE

## 2018-06-01 PROCEDURE — 87186 SC STD MICRODIL/AGAR DIL: CPT | Performed by: ADVANCED PRACTICE MIDWIFE

## 2018-06-01 PROCEDURE — 87653 STREP B DNA AMP PROBE: CPT | Performed by: ADVANCED PRACTICE MIDWIFE

## 2018-06-01 PROCEDURE — G0463 HOSPITAL OUTPT CLINIC VISIT: HCPCS | Mod: ZF

## 2018-06-01 RX ORDER — BREAST PUMP
1 EACH MISCELLANEOUS DAILY PRN
Qty: 1 EACH | Refills: 0 | Status: SHIPPED | OUTPATIENT
Start: 2018-06-01 | End: 2019-01-28

## 2018-06-01 ASSESSMENT — ANXIETY QUESTIONNAIRES
6. BECOMING EASILY ANNOYED OR IRRITABLE: NOT AT ALL
GAD7 TOTAL SCORE: 1
1. FEELING NERVOUS, ANXIOUS, OR ON EDGE: SEVERAL DAYS
2. NOT BEING ABLE TO STOP OR CONTROL WORRYING: NOT AT ALL
7. FEELING AFRAID AS IF SOMETHING AWFUL MIGHT HAPPEN: NOT AT ALL
5. BEING SO RESTLESS THAT IT IS HARD TO SIT STILL: NOT AT ALL
3. WORRYING TOO MUCH ABOUT DIFFERENT THINGS: NOT AT ALL

## 2018-06-01 ASSESSMENT — PATIENT HEALTH QUESTIONNAIRE - PHQ9: 5. POOR APPETITE OR OVEREATING: NOT AT ALL

## 2018-06-01 NOTE — LETTER
"2018       RE: Enid Mcbride  [address] [place] [address]     Dear Colleague,    Thank you for referring your patient, Enid Mcbride, to the [facility] at [facility]. Please see a copy of my visit note below.    Subjective:      33 year old  at 36w0d presents for a routine prenatal appointment.         Denies vaginal bleeding,  leakage of fluid, or change in vaginal discharge.  Reports irregular contractions.  Reports good  fetal movement.     No HA, visual changes, RUQ or epigastric pain.   Patient concerns: Feeling more pelvic pressure and wondering if it is normal.  Would like breast pump prescription today.  Consents to GBS and CBC today.  Feeling well overall.   Objective:  Vitals:    18 0824   BP: 100/66   BP Location: Left arm   Patient Position: Chair   Pulse: 87   Weight: 67.8 kg (149 lb 8 oz)   Height: 1.6 m (5' 3\")    See OB flowsheet    Assessment/Plan     Encounter Diagnoses   Name Primary?     Normal pregnancy in third trimester Yes     Lactation disorder      Orders Placed This Encounter   Procedures     CBC with platelets     Orders Placed This Encounter   Medications     Misc. Devices (BREAST PUMP) MISC     Si each daily as needed     Dispense:  1 each     Refill:  0       PHQ-9 SCORE 4/3/2018 4/3/2018   Total Score 2 2     PHQ-9 SCORE 4/3/2018 4/3/2018   Total Score 2 2     GBS screening: Obtained.  Labor signs discussed. Reinforced daily fetal movement counts.  Reviewed why/how to contact provider if headache/visual changes/RUQ or epigastric pain, decreased fetal movement, vaginal bleeding, leakage of fluid.   Return to clinic in 1 week and prn if questions or concerns.     NIRAV Napoles CNM    Again, thank you for allowing me to participate in the care of your patient.      Sincerely,    NIRAV Napoles CNM      "

## 2018-06-01 NOTE — MR AVS SNAPSHOT
After Visit Summary   6/1/2018    Enid Mcbride    MRN: [id number]           Patient Information     Date Of Birth          1985        Visit Information        Provider Department      6/1/2018 8:15 AM Earline Velasco APRN [facility]        Today's Diagnoses     Normal pregnancy in third trimester    -  1    Lactation disorder           Follow-ups after your visit        Follow-up notes from your care team     Return in about 1 week (around 6/8/2018) for PRECIOUS.      Your next 10 appointments already scheduled     Jun 08, 2018  8:15 AM CDT   RETURN OB with NIRAV Alva [facility]   [facility] ([facility])    [facility] [address]  [address]  [facility] [address] [phone]            Tony 15, 2018  9:15 AM CDT   RETURN OB with NIRAV Griffin [facility] ([facility])    [facility] [address]  [address]  [facility] [address] [phone]            Jun 22, 2018  8:45 AM CDT   RETURN OB with ANDRA Palmer   [facility] ([facility])    [facility] [address]  [address]  [facility] [address] [phone]              Who to contact     Please call your clinic at [phone] to:    Ask questions about your health    Make or cancel appointments    Discuss your medicines    Learn about your test results    Speak to your doctor            Additional Information About Your Visit        MyChart Information     [employer] gives you secure access to your electronic health record. If you see a primary care provider, you can also send messages to your care team and make appointments. If you have questions, please call your primary care clinic.  If you do not have a primary care provider, please call [phone] and they will assist you.       "Soma is an electronic gateway that provides easy, online access to your medical records. With Soma, you can request a clinic appointment, read your test results, renew a prescription or communicate with your care team.     To access your existing account, please contact your [facility] or call [phone] for assistance.        Care EveryWhere ID     This is your Care EveryWhere ID. This could be used by other organizations to access your [place] medical records  [id number]        Your Vitals Were     Pulse Height Last Period BMI (Body Mass Index)          87 1.6 m (5' 3\") 03/14/2017 26.48 kg/m2         Blood Pressure from Last 3 Encounters:   06/01/18 100/66   05/18/18 92/60   05/03/18 103/73    Weight from Last 3 Encounters:   06/01/18 67.8 kg (149 lb 8 oz)   05/18/18 67.8 kg (149 lb 6.4 oz)   05/03/18 67.1 kg (148 lb)              We Performed the Following     CBC with platelets     Group B strep PCR          Today's Medication Changes          These changes are accurate as of 6/1/18  8:43 AM.  If you have any questions, ask your nurse or doctor.               Start taking these medicines.        Dose/Directions    breast pump Misc   Used for:  Lactation disorder        Dose:  1 each   1 each daily as needed   Quantity:  1 each   Refills:  0            Where to get your medicines      Some of these will need a paper prescription and others can be bought over the counter.  Ask your nurse if you have questions.     Bring a paper prescription for each of these medications     breast pump Misc                Primary Care Provider Fax #    Physician No Ref-Primary [phone]       No address on file        Equal Access to Services     YANELI STEARNS : Fidel Weiss, ara toro, qaning kaaleb martins . So [facility] [phone].    ATENCIÓN: Si habla español, tiene a prieto disposición servicios gratuitos de asistencia " lingüística. Opal al [phone].    We comply with applicable federal civil rights laws and [place] laws. We do not discriminate on the basis of race, color, national origin, age, disability, sex, sexual orientation, or gender identity.            Thank you!     Thank you for choosing [facility]  for your care. Our goal is always to provide you with excellent care. Hearing back from our patients is one way we can continue to improve our services. Please take a few minutes to complete the written survey that you may receive in the mail after your visit with us. Thank you!             Your Updated Medication List - Protect others around you: Learn how to safely use, store and throw away your medicines at www.disposemymeds.org.          This list is accurate as of 6/1/18  8:43 AM.  Always use your most recent med list.                   Brand Name Dispense Instructions for use Diagnosis    breast pump Misc     1 each    1 each daily as needed    Lactation disorder       [facility] PRENATAL 28-0.8 MG Tabs      Take 1 tablet by mouth

## 2018-06-01 NOTE — LETTER
Date:June 5, 2018      Patient was self referred, no letter generated. Do not send.        [facility] Health Information

## 2018-06-01 NOTE — PROGRESS NOTES
"Subjective:      33 year old  at 36w0d presents for a routine prenatal appointment.         Denies vaginal bleeding,  leakage of fluid, or change in vaginal discharge.  Reports irregular contractions.  Reports good  fetal movement.     No HA, visual changes, RUQ or epigastric pain.   Patient concerns: Feeling more pelvic pressure and wondering if it is normal.  Would like breast pump prescription today.  Consents to GBS and CBC today.  Feeling well overall.   Objective:  Vitals:    18 0824   BP: 100/66   BP Location: Left arm   Patient Position: Chair   Pulse: 87   Weight: 67.8 kg (149 lb 8 oz)   Height: 1.6 m (5' 3\")    See OB flowsheet    Assessment/Plan     Encounter Diagnoses   Name Primary?     Normal pregnancy in third trimester Yes     Lactation disorder      Orders Placed This Encounter   Procedures     CBC with platelets     Orders Placed This Encounter   Medications     Misc. Devices (BREAST PUMP) MISC     Si each daily as needed     Dispense:  1 each     Refill:  0       PHQ-9 SCORE 4/3/2018 4/3/2018   Total Score 2 2     PHQ-9 SCORE 4/3/2018 4/3/2018   Total Score 2 2     GBS screening: Obtained.  Labor signs discussed. Reinforced daily fetal movement counts.  Reviewed why/how to contact provider if headache/visual changes/RUQ or epigastric pain, decreased fetal movement, vaginal bleeding, leakage of fluid.   Return to clinic in 1 week and prn if questions or concerns.     NIRAV Napoles CNM  "

## 2018-06-02 LAB
GP B STREP DNA SPEC QL NAA+PROBE: POSITIVE
SPECIMEN SOURCE: ABNORMAL

## 2018-06-02 ASSESSMENT — ANXIETY QUESTIONNAIRES: GAD7 TOTAL SCORE: 1

## 2018-06-02 ASSESSMENT — PATIENT HEALTH QUESTIONNAIRE - PHQ9: SUM OF ALL RESPONSES TO PHQ QUESTIONS 1-9: 1

## 2018-06-03 PROBLEM — B95.1 POSITIVE GBS TEST: Status: ACTIVE | Noted: 2018-06-03

## 2018-06-05 LAB
BACTERIA SPEC CULT: ABNORMAL
SPECIMEN SOURCE: ABNORMAL

## 2018-06-08 ENCOUNTER — OFFICE VISIT (OUTPATIENT)
Dept: OBGYN | Facility: CLINIC | Age: 33
End: 2018-06-08
Attending: ADVANCED PRACTICE MIDWIFE
Payer: COMMERCIAL

## 2018-06-08 VITALS
SYSTOLIC BLOOD PRESSURE: 102 MMHG | WEIGHT: 148.5 LBS | BODY MASS INDEX: 26.31 KG/M2 | HEART RATE: 88 BPM | DIASTOLIC BLOOD PRESSURE: 62 MMHG

## 2018-06-08 DIAGNOSIS — B95.1 POSITIVE GBS TEST: ICD-10-CM

## 2018-06-08 DIAGNOSIS — Z34.83 ENCOUNTER FOR SUPERVISION OF OTHER NORMAL PREGNANCY, THIRD TRIMESTER: Primary | ICD-10-CM

## 2018-06-08 DIAGNOSIS — R10.2 PELVIC PAIN IN FEMALE: ICD-10-CM

## 2018-06-08 PROCEDURE — G0463 HOSPITAL OUTPT CLINIC VISIT: HCPCS | Mod: ZF

## 2018-06-08 NOTE — LETTER
"2018       RE: Enid Mcbride  [address] [place] [address]     Dear Colleague,    Thank you for referring your patient, Enid Mcbride, to the [facility] at [facility]. Please see a copy of my visit note below.    Subjective:      33 year old  at 37w0d presents for a routine prenatal appointment with her supportive  and daughter Stephany.         No vaginal bleeding,  leakage of fluid, or change in vaginal discharge.  Frequent BH contractions.  Normal daily fetal movement.     No HA, visual changes, RUQ or epigastric pain.   Patient concerns: Feeling well overall. Having severe \"lightening crotch\". Feeling well when sitting but pain occurs when walking at work. Has not tried a belly support belt. Having frequent BH cxns that occasionally feel like menstrual cramps. Having anxiety about arriving to hospital on time for GBS prophylaxis. Lives about 15 min from hospital.     Objective:  Vitals:    18 0802   BP: 102/62   BP Location: Left arm   Patient Position: Chair   Pulse: 88   Weight: 67.4 kg (148 lb 8 oz)    See OB flowsheet  VE loose 1 cm externally/-3    Assessment/Plan     Encounter Diagnoses   Name Primary?     Encounter for supervision of other normal pregnancy,  [facility] CNM care Yes     Positive GBS test      Pelvic pain in female        PHQ-9 SCORE 4/3/2018 4/3/2018 2018   Total Score 2 2 1     Rx for maternity support belt. Pt will also try knees and forearm positioning for pelvic pain.   Reviewed ideal timing of GBS prophylaxis  Labor signs discussed. Reinforced daily fetal movement counts. Encouraged to page prn if unsure of when to come to hospital.   Reviewed why/how to contact provider if headache/visual changes/RUQ or epigastric pain, decreased fetal movement, vaginal bleeding, leakage of fluid. Return to clinic in 1 week and prn if questions or concerns.     NIRAV Shah CNARNOLDO    Again, thank " you for allowing me to participate in the care of your patient.      Sincerely,    NIRAV Shah CNM

## 2018-06-08 NOTE — MR AVS SNAPSHOT
After Visit Summary   2018    Enid Mcbride    MRN: [id number]           Patient Information     Date Of Birth          1985        Visit Information        Provider Department      2018 8:15 AM Abigail Narayan APRN [facility]        Today's Diagnoses     Encounter for supervision of other normal pregnancy,  [facility] care    -  1    Positive GBS test        Pelvic pain in female           Follow-ups after your visit        Follow-up notes from your care team     Return in about 1 week (around 6/15/2018) for Return OB.      Your next 10 appointments already scheduled     Tony 15, 2018  9:15 AM CDT   RETURN OB with NIRAV Griffin [facility]   [facility] ([facility])    [facility] [address]  [address]  [facility] [address] [phone]            2018  8:45 AM CDT   RETURN OB with Nicol Willams [facility]   [facility] ([facility])    [facility] [address]  [address] [facility] [address] [phone]              Who to contact     Please call your clinic at [phone] to:    Ask questions about your health    Make or cancel appointments    Discuss your medicines    Learn about your test results    Speak to your doctor            Additional Information About Your Visit        MyChart Information     [employer] gives you secure access to your electronic health record. If you see a primary care provider, you can also send messages to your care team and make appointments. If you have questions, please call your primary care clinic.  If you do not have a primary care provider, please call [phone] and they will assist you.      [employer] is an electronic gateway that provides easy, online access to your medical records. With [employer], you can request a clinic appointment, read your test results, renew a  prescription or communicate with your care team.     To access your existing account, please contact your [facility] or call [phone] for assistance.        Care EveryWhere ID     This is your Care EveryWhere ID. This could be used by other organizations to access your [facility] medical records  [id number]        Your Vitals Were     Pulse Last Period BMI (Body Mass Index)             88 03/14/2017 26.31 kg/m2          Blood Pressure from Last 3 Encounters:   06/08/18 102/62   06/01/18 100/66   05/18/18 92/60    Weight from Last 3 Encounters:   06/08/18 67.4 kg (148 lb 8 oz)   06/01/18 67.8 kg (149 lb 8 oz)   05/18/18 67.8 kg (149 lb 6.4 oz)              Today, you had the following     No orders found for display         Today's Medication Changes          These changes are accurate as of 6/8/18  8:29 AM.  If you have any questions, ask your nurse or doctor.               Start taking these medicines.        Dose/Directions    order for DME   Used for:  Pelvic pain in female   Started by:  Abigail Narayan APRN CNM        Maternity Belt order   Quantity:  1 each   Refills:  0            Where to get your medicines      Some of these will need a paper prescription and others can be bought over the counter.  Ask your nurse if you have questions.     Bring a paper prescription for each of these medications     order for DME                Primary Care Provider Fax #    Physician No Ref-Primary [phone]       No address on file        Equal Access to Services     YANELI STEARNS : Hadii neftaly white Soblaine, waaxda luqadaha, qaybta kaalmada adelolisyada, eb irene. So [facility] [phone].    ATENCIÓN: Si habla español, tiene a prieto disposición servicios gratuitos de asistencia lingüística. Llame al [phone].    We comply with applicable federal civil rights laws and [place] laws. We do not discriminate on the basis of race, color, national  origin, age, disability, sex, sexual orientation, or gender identity.            Thank you!     Thank you for choosing [facility]  for your care. Our goal is always to provide you with excellent care. Hearing back from our patients is one way we can continue to improve our services. Please take a few minutes to complete the written survey that you may receive in the mail after your visit with us. Thank you!             Your Updated Medication List - Protect others around you: Learn how to safely use, store and throw away your medicines at www.disposemymeds.org.          This list is accurate as of 6/8/18  8:29 AM.  Always use your most recent med list.                   Brand Name Dispense Instructions for use Diagnosis    breast pump Misc     1 each    1 each daily as needed    Lactation disorder       [employer] PRENATAL 28-0.8 MG Tabs      Take 1 tablet by mouth        order for DME     1 each    Maternity Belt order    Pelvic pain in female

## 2018-06-08 NOTE — LETTER
Date:June 11, 2018      Patient was self referred, no letter generated. Do not send.        [facility] Information

## 2018-06-08 NOTE — PROGRESS NOTES
"Subjective:      33 year old  at 37w0d presents for a routine prenatal appointment with her supportive  and daughter Stephany.         No vaginal bleeding,  leakage of fluid, or change in vaginal discharge.  Frequent BH contractions.  Normal daily fetal movement.     No HA, visual changes, RUQ or epigastric pain.   Patient concerns: Feeling well overall. Having severe \"lightening crotch\". Feeling well when sitting but pain occurs when walking at work. Has not tried a belly support belt. Having frequent BH cxns that occasionally feel like menstrual cramps. Having anxiety about arriving to hospital on time for GBS prophylaxis. Lives about 15 min from hospital.     Objective:  Vitals:    18 0802   BP: 102/62   BP Location: Left arm   Patient Position: Chair   Pulse: 88   Weight: 67.4 kg (148 lb 8 oz)    See OB flowsheet  VE loose 1 cm externally/-3    Assessment/Plan     Encounter Diagnoses   Name Primary?     Encounter for supervision of other normal pregnancy,  S CNM care Yes     Positive GBS test      Pelvic pain in female        PHQ-9 SCORE 4/3/2018 4/3/2018 2018   Total Score 2 2 1     Rx for maternity support belt. Pt will also try knees and forearm positioning for pelvic pain.   Reviewed ideal timing of GBS prophylaxis  Labor signs discussed. Reinforced daily fetal movement counts. Encouraged to page prn if unsure of when to come to hospital.   Reviewed why/how to contact provider if headache/visual changes/RUQ or epigastric pain, decreased fetal movement, vaginal bleeding, leakage of fluid. Return to clinic in 1 week and prn if questions or concerns.     NIRAV Shah CNM  "

## 2018-06-09 ENCOUNTER — HOSPITAL ENCOUNTER (OUTPATIENT)
Facility: CLINIC | Age: 33
Discharge: HOME | End: 2018-06-09
Attending: ADVANCED PRACTICE MIDWIFE | Admitting: ADVANCED PRACTICE MIDWIFE
Payer: COMMERCIAL

## 2018-06-09 VITALS — DIASTOLIC BLOOD PRESSURE: 66 MMHG | RESPIRATION RATE: 16 BRPM | SYSTOLIC BLOOD PRESSURE: 99 MMHG | TEMPERATURE: 98.1 F

## 2018-06-09 PROBLEM — Z36.89 ENCOUNTER FOR TRIAGE IN PREGNANT PATIENT: Status: ACTIVE | Noted: 2018-06-09

## 2018-06-09 NOTE — IP AVS SNAPSHOT
MRN:[id number]                      After Visit Summary   6/9/2018    Enid Mcbride    MRN: [id number]           Thank you!     Thank you for choosing [facility] for your care. Our goal is always to provide you with excellent care. Hearing back from our patients is one way we can continue to improve our services. Please take a few minutes to complete the written survey that you may receive in the mail after you visit with us. Thank you!        Patient Information     Date Of Birth          1985        About your hospital stay     You were admitted on:  June 9, 2018 You last received care in the:  [facility]    You were discharged on:  June 9, 2018       Who to Call     For medical emergencies, please call 911.  For non-urgent questions about your medical care, please call your primary care provider or clinic, None          Attending Provider     Provider Specialty    Soila, NIRAV Snyder CNM [facility]       Primary Care Provider Fax #    Physician No Ref-Primary [phone]      Your next 10 appointments already scheduled     Tony 15, 2018  9:15 AM CDT   RETURN OB with NIRAV Griffin CNM   [facility] ([facility])    [facility] [address]  [address]  [address]  [facility] [address] [phone]            Jun 22, 2018  8:45 AM CDT   RETURN OB with Nicol Willams CNM   [facility] ([facility])    [facility] [address]  [address]  [address]  [facility] [id number] [phone]              Further instructions from your care team       Discharge Instruction for Undelivered Patients      You were seen for: Labor Assessment  We Consulted: Dawn Gracia, [facility]  You had (Test or Medicine): Fetal and uterine monitoring     Diet:   Drink 8 to 12 glasses of liquids (milk, juice, water) every day.     Activity:  Call your doctor or nurse  midwife if your baby is moving less than usual.     Call your provider if you notice:  Swelling in your face or increased swelling in your hands or legs.  Headaches that are not relieved by Tylenol (acetaminophen).  Changes in your vision (blurring: seeing spots or stars.)  Nausea (sick to your stomach) and vomiting (throwing up).   Weight gain of 5 pounds or more per week.  Heartburn that doesn't go away.  Signs of bladder infection: pain when you urinate (use the toilet), need to go more often and more urgently.  The bag of drew (rupture of membranes) breaks, or you notice leaking in your underwear.  Bright red blood in your underwear.  Abdominal (lower belly) or stomach pain.  For first baby: Contractions (tightening) less than 5 minutes apart for one hour or more.  Second (plus) baby: Contractions (tightening) less than 10 minutes apart and getting stronger.  *If less than 34 weeks: Contractions (tightenings) more than 6 times in one hour.  Increase or change in vaginal discharge (note the color and amount)    Follow-up:  As scheduled in the clinic          Pending Results     No orders found from 6/7/2018 to 6/10/2018.            Statement of Approval     Ordered          06/09/18 1225  I have reviewed and agree with all the recommendations and orders detailed in this document.  EFFECTIVE NOW     Approved and electronically signed by:  René Goncalves DO             Admission Information     Date & Time Provider Department Dept. Phone    6/9/2018 Dawn Cm APRN CNM [facility] [phone]      Your Vitals Were     Blood Pressure Temperature Respirations Last Period          99/66 98.1  F (36.7  C) (Oral) 16 03/14/2017        MyChart Information     [employer] gives you secure access to your electronic health record. If you see a primary care provider, you can also send messages to your care team and make appointments. If you have questions, please call your primary care clinic.  If you do not have a  primary care provider, please call [phone] and they will assist you.        Care EveryWhere ID     This is your Care EveryWhere ID. This could be used by other organizations to access your [facility] medical records  [id number]        Equal Access to Services     YANELI STEARNS : Hadii aad ku hadrosarioshahana Abhishek, wawillianda luqadaha, yulianata kayanely bey, eb bill musajuli darby katiaashok irene. So [facility] [phone].    ATENCIÓN: Si habla español, tiene a prieto disposición servicios gratuitos de asistencia lingüística. Llame al [phone].    We comply with applicable federal civil rights laws and [place] laws. We do not discriminate on the basis of race, color, national origin, age, disability, sex, sexual orientation, or gender identity.               Review of your medicines      CONTINUE these medicines which have NOT CHANGED        Dose / Directions    breast pump Misc   Used for:  Lactation disorder        Dose:  1 each   1 each daily as needed   Quantity:  1 each   Refills:  0       CVS PRENATAL 28-0.8 MG Tabs        Dose:  1 tablet   Take 1 tablet by mouth   Refills:  0       order for DME   Used for:  Pelvic pain in female        Maternity Belt order   Quantity:  1 each   Refills:  0                Protect others around you: Learn how to safely use, store and throw away your medicines at www.disposemymeds.org.             Medication List: This is a list of all your medications and when to take them. Check marks below indicate your daily home schedule. Keep this list as a reference.      Medications           Morning Afternoon Evening Bedtime As Needed    breast pump Misc   1 each daily as needed                                [facility]S PRENATAL 28-0.8 MG Tabs   Take 1 tablet by mouth                                order for DME   Maternity Belt order                                          More Information        Recognizing Labor    The beginning of labor is the beginning of birth. You ll start to feel  "strong contractions. That s when the muscles of your uterus tighten up to help push your baby out during birth.  Yes, labor has probably started   Signs of labor include:    Your contractions are getting stronger and more painful instead of weaker. You ll probably feel them throughout your whole uterus.    Your contractions are regular. This means that you feel them about every 5 to 10 minutes. And they are getting closer together.    You have pink-colored or blood-streaked fluid from your vagina.    You feel that the baby has \"dropped\" lower in your pelvis     Your water breaks. It may be a gush or a slow trickle of clear fluid from your vagina.  No, it s probably not real labor   Signs of false labor include:    Your contractions aren t regular or strong.    You feel the contractions only in your lower uterus.    Your contractions go away when you walk or change position.    Your contractions go away after drinking fluids.  When to call your healthcare provider  Call your healthcare provider or clinic right away if you notice any of these signs:    Fluid from your vagina, with or without contractions.    Bleeding heavy enough that you need a sanitary pad.    You don t feel your baby moving as much as before.     NOTE: Contractions are timed by both of these measures:    The length of each contraction from its start to its finish.    How far apart the contractions are--the time between the start of one contraction and the start of the next contraction.   Date Last Reviewed: 10/1/2017    6152-7259 [employer]. [address] [place] [address]. All rights reserved. This information is not intended as a substitute for professional medical care. Always follow your healthcare professional's instructions.             "

## 2018-06-09 NOTE — IP AVS SNAPSHOT
[facility]    [address] [place] [address]    Phone:  [phone]                                       After Visit Summary   6/9/2018    Enid Mcbride    MRN: [id number]           After Visit Summary Signature Page     I have received my discharge instructions, and my questions have been answered. I have discussed any challenges I see with this plan with the nurse or doctor.    ..........................................................................................................................................  Patient/Patient Representative Signature      ..........................................................................................................................................  Patient Representative Print Name and Relationship to Patient    ..................................................               ................................................  Date                                            Time    ..........................................................................................................................................  Reviewed by Signature/Title    ...................................................              ..............................................  Date                                                            Time

## 2018-06-09 NOTE — DISCHARGE INSTRUCTIONS
Discharge Instruction for Undelivered Patients      You were seen for: Labor Assessment  We Consulted: Dawn Gracia, [facility]  You had (Test or Medicine): Fetal and uterine monitoring     Diet:   Drink 8 to 12 glasses of liquids (milk, juice, water) every day.     Activity:  Call your doctor or nurse midwife if your baby is moving less than usual.     Call your provider if you notice:  Swelling in your face or increased swelling in your hands or legs.  Headaches that are not relieved by Tylenol (acetaminophen).  Changes in your vision (blurring: seeing spots or stars.)  Nausea (sick to your stomach) and vomiting (throwing up).   Weight gain of 5 pounds or more per week.  Heartburn that doesn't go away.  Signs of bladder infection: pain when you urinate (use the toilet), need to go more often and more urgently.  The bag of drew (rupture of membranes) breaks, or you notice leaking in your underwear.  Bright red blood in your underwear.  Abdominal (lower belly) or stomach pain.  For first baby: Contractions (tightening) less than 5 minutes apart for one hour or more.  Second (plus) baby: Contractions (tightening) less than 10 minutes apart and getting stronger.  *If less than 34 weeks: Contractions (tightenings) more than 6 times in one hour.  Increase or change in vaginal discharge (note the color and amount)    Follow-up:  As scheduled in the clinic
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
## 2018-06-15 ENCOUNTER — OFFICE VISIT (OUTPATIENT)
Dept: OBGYN | Facility: CLINIC | Age: 33
End: 2018-06-15
Attending: ADVANCED PRACTICE MIDWIFE
Payer: COMMERCIAL

## 2018-06-15 ENCOUNTER — OFFICE VISIT (OUTPATIENT)
Dept: OBGYN | Facility: CLINIC | Age: 33
End: 2018-06-15
Payer: COMMERCIAL

## 2018-06-15 VITALS
HEIGHT: 63 IN | HEART RATE: 89 BPM | BODY MASS INDEX: 26.65 KG/M2 | WEIGHT: 150.4 LBS | DIASTOLIC BLOOD PRESSURE: 67 MMHG | SYSTOLIC BLOOD PRESSURE: 101 MMHG

## 2018-06-15 DIAGNOSIS — Z36.89 EVALUATE FETAL POSITION USING ULTRASOUND: Primary | ICD-10-CM

## 2018-06-15 DIAGNOSIS — Z34.83 ENCOUNTER FOR SUPERVISION OF OTHER NORMAL PREGNANCY, THIRD TRIMESTER: Primary | ICD-10-CM

## 2018-06-15 PROCEDURE — G0463 HOSPITAL OUTPT CLINIC VISIT: HCPCS | Mod: ZF,25

## 2018-06-15 PROCEDURE — 76815 OB US LIMITED FETUS(S): CPT | Mod: ZF

## 2018-06-15 NOTE — LETTER
"6/15/2018       RE: Enid Mcbride  [address] [place] [address]     Dear Colleague,    Thank you for referring your patient, Enid Mcbride, to the [facility] at [facility]. Please see a copy of my visit note below.    Subjective:     33 year old  at 38w0d presents for routine prenatal visit.         no  vaginal bleeding or leakage of fluid.   Having more  contractions.  Good  fetal movement.        No HA, visual changes, RUQ or epigastric pain.   Patient concerns: [facility]     Feeling well overall. Desires cervix check today  1st  Anniversary tomorrow   Objective:  Vitals:    06/15/18 0928   BP: 101/67   BP Location: Left arm   Patient Position: Chair   Pulse: 89   Weight: 68.2 kg (150 lb 6.4 oz)   Height: 1.6 m (5' 3\")    See OB flowsheet  Assessment/Plan     Encounter Diagnoses   Name Primary?     Encounter for supervision of other normal pregnancy,  S CNM care Yes     Positive GBS test    reviewed + GBS and treating in labor   Cephalic confirmed by US today  Floating  High   - Reviewed why/how to contact provider if headache/visual changes/RUQ or epigastric pain, decreased fetal movement, vaginal bleeding, leakage of fluid or strong/regular contractions.   Patient education/orders or handouts today:  Sign/symptoms of labor and When to call for labor or other concerns  Return to clinic in 1 week and prn if questions or concerns.   NIRAV Griffin CNM      "

## 2018-06-15 NOTE — PROGRESS NOTES
"Subjective:     33 year old  at 38w0d presents for routine prenatal visit.         no  vaginal bleeding or leakage of fluid.   Having more  contractions.  Good  fetal movement.        No HA, visual changes, RUQ or epigastric pain.   Patient concerns: [facility]     Feeling well overall. Desires cervix check today  1st  Anniversary tomorrow   Objective:  Vitals:    06/15/18 0928   BP: 101/67   BP Location: Left arm   Patient Position: Chair   Pulse: 89   Weight: 68.2 kg (150 lb 6.4 oz)   Height: 1.6 m (5' 3\")    See OB flowsheet  Assessment/Plan     Encounter Diagnoses   Name Primary?     Encounter for supervision of other normal pregnancy,  [facility] CNM care Yes     Positive GBS test    reviewed + GBS and treating in labor   Cephalic confirmed by US today  Floating  High   - Reviewed why/how to contact provider if headache/visual changes/RUQ or epigastric pain, decreased fetal movement, vaginal bleeding, leakage of fluid or strong/regular contractions.   Patient education/orders or handouts today:  Sign/symptoms of labor and When to call for labor or other concerns  Return to clinic in 1 week and prn if questions or concerns.   Diana Arriaga, NIRAV SILVERMANM      "

## 2018-06-15 NOTE — PROGRESS NOTES
Indication - fetal position. Maternal age 33  38w0d  Estimated Date of Delivery: 2018. This was a transabdominal study.    Fetal presentation - cephalic.    FHR = 139bpm    JULIANNE = normal, MVP = 7.6cm.    Placenta - posterior, Grade 1    Recommend further studies as indicated.    INDRA Donohue MD

## 2018-06-15 NOTE — MR AVS SNAPSHOT
After Visit Summary   6/15/2018    Enid Mcbride    MRN: [id number]           Patient Information     Date Of Birth          1985        Visit Information        Provider Department      6/15/2018 9:15 AM Diana Arriaga APRN [facility]        Today's Diagnoses     Encounter for supervision of other normal pregnancy,  [facility] care    -  1       Follow-ups after your visit        Follow-up notes from your care team     Return in about 1 week (around 2018) for PRECIOUS.      Your next 10 appointments already scheduled     2018  8:45 AM CDT   RETURN OB with Nicol Willams CNM   [facility] ([facility])    [facility] [address]  [address]  [facility] [address] [phone]              Who to contact     Please call your clinic at [phone] to:    Ask questions about your health    Make or cancel appointments    Discuss your medicines    Learn about your test results    Speak to your doctor            Additional Information About Your Visit        MyChart Information     [employer] gives you secure access to your electronic health record. If you see a primary care provider, you can also send messages to your care team and make appointments. If you have questions, please call your primary care clinic.  If you do not have a primary care provider, please call [phone] and they will assist you.      [employer] is an electronic gateway that provides easy, online access to your medical records. With [employer], you can request a clinic appointment, read your test results, renew a prescription or communicate with your care team.     To access your existing account, please contact your [facility] or call [phone] for assistance.        Care EveryWhere ID     This is your Care EveryWhere ID. This could be used by other organizations to access your  "[place] medical records  [id number]        Your Vitals Were     Pulse Height Last Period BMI (Body Mass Index)          89 1.6 m (5' 3\") 03/14/2017 26.64 kg/m2         Blood Pressure from Last 3 Encounters:   06/15/18 101/67   06/09/18 99/66   06/08/18 102/62    Weight from Last 3 Encounters:   06/15/18 68.2 kg (150 lb 6.4 oz)   06/08/18 67.4 kg (148 lb 8 oz)   06/01/18 67.8 kg (149 lb 8 oz)              Today, you had the following     No orders found for display       Primary Care Provider Fax #    Physician No Ref-Primary [phone]       No address on file        Equal Access to Services     YANELI STEARNS : Fidel Weiss, walennox toro, qaybta kaalmagiovanny bey, eb honeycutt . So [facility] [phone].    ATENCIÓN: Si habla español, tiene a prieto disposición servicios gratuitos de asistencia lingüística. Llame al [phone].    We comply with applicable federal civil rights laws and [place] laws. We do not discriminate on the basis of race, color, national origin, age, disability, sex, sexual orientation, or gender identity.            Thank you!     Thank you for choosing [facility]  for your care. Our goal is always to provide you with excellent care. Hearing back from our patients is one way we can continue to improve our services. Please take a few minutes to complete the written survey that you may receive in the mail after your visit with us. Thank you!             Your Updated Medication List - Protect others around you: Learn how to safely use, store and throw away your medicines at www.disposemymeds.org.          This list is accurate as of 6/15/18 12:39 PM.  Always use your most recent med list.                   Brand Name Dispense Instructions for use Diagnosis    breast pump Misc     1 each    1 each daily as needed    Lactation disorder       [employer] PRENATAL 28-0.8 MG Tabs      Take 1 tablet by mouth        order for DME     1 each "    Maternity Belt order    Pelvic pain in female

## 2018-06-15 NOTE — MR AVS SNAPSHOT
After Visit Summary   6/15/2018    Enid Mcbride    MRN: [id number]           Patient Information     Date Of Birth          1985        Visit Information        Provider Department      6/15/2018 11:30 AM [facility] ULTRASOUND [facility]        Today's Diagnoses     Evaluate fetal position using ultrasound    -  1       Follow-ups after your visit        Your next 10 appointments already scheduled     Jun 22, 2018  8:45 AM CDT   RETURN OB with Nicol Willams CNM   [facility] ([facility])    [facility] [address]  [address]  [facility] [address] [phone]              Who to contact     Please call your clinic at [phone] to:    Ask questions about your health    Make or cancel appointments    Discuss your medicines    Learn about your test results    Speak to your doctor            Additional Information About Your Visit        MyChart Information     [employer] gives you secure access to your electronic health record. If you see a primary care provider, you can also send messages to your care team and make appointments. If you have questions, please call your primary care clinic.  If you do not have a primary care provider, please call [phone] and they will assist you.      [employer] is an electronic gateway that provides easy, online access to your medical records. With [employer], you can request a clinic appointment, read your test results, renew a prescription or communicate with your care team.     To access your existing account, please contact your [facility] or call [phone] for assistance.        Care EveryWhere ID     This is your Care EveryWhere ID. This could be used by other organizations to access your [place] medical records  [id number]        Your Vitals Were     Last Period                   03/14/2017            Blood Pressure  from Last 3 Encounters:   06/15/18 101/67   06/09/18 99/66   06/08/18 102/62    Weight from Last 3 Encounters:   06/15/18 68.2 kg (150 lb 6.4 oz)   06/08/18 67.4 kg (148 lb 8 oz)   06/01/18 67.8 kg (149 lb 8 oz)               Primary Care Provider Fax #    Physician No Ref-Primary [phone]       No address on file        Equal Access to Services     YANELI STEARNS : Hadii aad ku hadasho Soomaali, waaxda luqadaha, qaybta kaalmada adeegyada, waxay zuleykain musan mehnaz angelrosalindaashok honeycutt . So [facility] [phone].    ATENCIÓN: Si habla español, tiene a prieto disposición servicios gratuitos de asistencia lingüística. [facility] [phone].    We comply with applicable federal civil rights laws and [place] laws. We do not discriminate on the basis of race, color, national origin, age, disability, sex, sexual orientation, or gender identity.            Thank you!     Thank you for choosing [facility]  for your care. Our goal is always to provide you with excellent care. Hearing back from our patients is one way we can continue to improve our services. Please take a few minutes to complete the written survey that you may receive in the mail after your visit with us. Thank you!             Your Updated Medication List - Protect others around you: Learn how to safely use, store and throw away your medicines at www.disposemymeds.org.          This list is accurate as of 6/15/18 12:40 PM.  Always use your most recent med list.                   Brand Name Dispense Instructions for use Diagnosis    breast pump Misc     1 each    1 each daily as needed    Lactation disorder       [employer] PRENATAL 28-0.8 MG Tabs      Take 1 tablet by mouth        order for DME     1 each    Maternity Belt order    Pelvic pain in female

## 2018-06-22 ENCOUNTER — OFFICE VISIT (OUTPATIENT)
Dept: OBGYN | Facility: CLINIC | Age: 33
End: 2018-06-22
Attending: ADVANCED PRACTICE MIDWIFE
Payer: COMMERCIAL

## 2018-06-22 VITALS
HEIGHT: 63 IN | HEART RATE: 87 BPM | BODY MASS INDEX: 26.52 KG/M2 | DIASTOLIC BLOOD PRESSURE: 70 MMHG | SYSTOLIC BLOOD PRESSURE: 109 MMHG | WEIGHT: 149.7 LBS

## 2018-06-22 DIAGNOSIS — Z34.83 ENCOUNTER FOR SUPERVISION OF OTHER NORMAL PREGNANCY, THIRD TRIMESTER: Primary | ICD-10-CM

## 2018-06-22 PROCEDURE — G0463 HOSPITAL OUTPT CLINIC VISIT: HCPCS | Mod: ZF

## 2018-06-22 NOTE — LETTER
"2018       RE: Enid Mcbride  [address] [place] [address]     Dear Colleague,    Thank you for referring your patient, Enid Mcbride, to the [facility] at [facility]. Please see a copy of my visit note below.    Subjective:     33 year old  at 39w0d presents for routine prenatal visit.           Patient concerns: Feeling well overall. Reports random cramping x 1 month with no change.   States that she \"has a feeling\" she is going to have her baby on Wednesday.    Denies regular contractions, vaginal bleeding, discharge or leakage of fluid. Report +fetal movement.  No HA, vision changes, ruq/epigastric pain.      Objective:  Vitals:    18 0851   BP: 109/70   BP Location: Left arm   Patient Position: Chair   Pulse: 87   Weight: 67.9 kg (149 lb 11.2 oz)   Height: 1.6 m (5' 3\")      See OB flowsheet    Assessment/Plan     Encounter Diagnosis   Name Primary?     Encounter for supervision of other normal pregnancy,  [facility] CNM care Yes     - Reviewed why/how to contact provider if headache/visual changes/RUQ or epigastric pain, decreased fetal movement, vaginal bleeding, leakage of fluid or strong/regular contractions.   Patient education/orders or handouts today:  Sign/symptoms of labor and When to call for labor or other concerns    Continue scheduled prenatal care, RTC in 1 week and prn if questions or concerns.     NIRAV Luis CNM        Again, thank you for allowing me to participate in the care of your patient.      Sincerely,    Nicol Willams CNM      "

## 2018-06-22 NOTE — MR AVS SNAPSHOT
After Visit Summary   2018    Enid Mcbride    MRN: [id number]           Patient Information     Date Of Birth          1985        Visit Information        Provider Department      2018 8:45 AM Nicol Willams CNM [facility]        Today's Diagnoses     Encounter for supervision of other normal pregnancy,  S CORINNA care    -  1       Follow-ups after your visit        Follow-up notes from your care team     Return in about 1 week (around 2018) for MOHSEN WEINSTEIN.      Your next 10 appointments already scheduled     2018  8:15 AM CDT   RETURN OB with Nicol Willams CNM   [facility] ([facility])    [facility] [address]  [address]  [facility] [id number] [phone]              Who to contact     Please call your clinic at [phone] to:    Ask questions about your health    Make or cancel appointments    Discuss your medicines    Learn about your test results    Speak to your doctor            Additional Information About Your Visit        MyChart Information     [employer] gives you secure access to your electronic health record. If you see a primary care provider, you can also send messages to your care team and make appointments. If you have questions, please call your primary care clinic.  If you do not have a primary care provider, please call [phone] and they will assist you.      [employer] is an electronic gateway that provides easy, online access to your medical records. With [employer], you can request a clinic appointment, read your test results, renew a prescription or communicate with your care team.     To access your existing account, please contact your [facility] or call [phone] for assistance.        Care EveryWhere ID     This is your Care EveryWhere ID. This could be used by other  "organizations to access your [place] medical records  [id number]        Your Vitals Were     Pulse Height Last Period BMI (Body Mass Index)          87 1.6 m (5' 3\") 03/14/2017 26.52 kg/m2         Blood Pressure from Last 3 Encounters:   06/22/18 109/70   06/15/18 101/67   06/09/18 99/66    Weight from Last 3 Encounters:   06/22/18 67.9 kg (149 lb 11.2 oz)   06/15/18 68.2 kg (150 lb 6.4 oz)   06/08/18 67.4 kg (148 lb 8 oz)              Today, you had the following     No orders found for display       Primary Care Provider Fax #    Physician No Ref-Primary [phone]       No address on file        Equal Access to Services     YANELI STEARNS : Fidel Weiss, waaxda luqadaha, qaybta kaalmada mehnazyagiovanny, eb honeycutt . So [facility] [phone].    ATENCIÓN: Si habla español, tiene a prieto disposición servicios gratuitos de asistencia lingüística. Llame al [phone].    We comply with applicable federal civil rights laws and [place] laws. We do not discriminate on the basis of race, color, national origin, age, disability, sex, sexual orientation, or gender identity.            Thank you!     Thank you for choosing [facility]  for your care. Our goal is always to provide you with excellent care. Hearing back from our patients is one way we can continue to improve our services. Please take a few minutes to complete the written survey that you may receive in the mail after your visit with us. Thank you!             Your Updated Medication List - Protect others around you: Learn how to safely use, store and throw away your medicines at www.disposemymeds.org.          This list is accurate as of 6/22/18 11:59 PM.  Always use your most recent med list.                   Brand Name Dispense Instructions for use Diagnosis    breast pump Misc     1 each    1 each daily as needed    Lactation disorder       [employer] PRENATAL 28-0.8 MG Tabs      Take 1 tablet by mouth "        order for DME     1 each    Maternity Belt order    Pelvic pain in female

## 2018-06-22 NOTE — PROGRESS NOTES
"Subjective:     33 year old  at 39w0d presents for routine prenatal visit.           Patient concerns: Feeling well overall. Reports random cramping x 1 month with no change.   States that she \"has a feeling\" she is going to have her baby on Wednesday.    Denies regular contractions, vaginal bleeding, discharge or leakage of fluid. Report +fetal movement.  No HA, vision changes, ruq/epigastric pain.      Objective:  Vitals:    18 0851   BP: 109/70   BP Location: Left arm   Patient Position: Chair   Pulse: 87   Weight: 67.9 kg (149 lb 11.2 oz)   Height: 1.6 m (5' 3\")      See OB flowsheet    Assessment/Plan     Encounter Diagnosis   Name Primary?     Encounter for supervision of other normal pregnancy,  S CNM care Yes     - Reviewed why/how to contact provider if headache/visual changes/RUQ or epigastric pain, decreased fetal movement, vaginal bleeding, leakage of fluid or strong/regular contractions.   Patient education/orders or handouts today:  Sign/symptoms of labor and When to call for labor or other concerns    Continue scheduled prenatal care, RTC in 1 week and prn if questions or concerns.     NIRAV Luis, CORINNA      "

## 2018-06-29 ENCOUNTER — OFFICE VISIT (OUTPATIENT)
Dept: OBGYN | Facility: CLINIC | Age: 33
End: 2018-06-29
Attending: ADVANCED PRACTICE MIDWIFE
Payer: COMMERCIAL

## 2018-06-29 VITALS
SYSTOLIC BLOOD PRESSURE: 103 MMHG | WEIGHT: 148.1 LBS | HEIGHT: 63 IN | DIASTOLIC BLOOD PRESSURE: 67 MMHG | HEART RATE: 83 BPM | BODY MASS INDEX: 26.24 KG/M2

## 2018-06-29 DIAGNOSIS — Z34.83 ENCOUNTER FOR SUPERVISION OF OTHER NORMAL PREGNANCY, THIRD TRIMESTER: Primary | ICD-10-CM

## 2018-06-29 PROCEDURE — G0463 HOSPITAL OUTPT CLINIC VISIT: HCPCS | Mod: ZF

## 2018-06-29 NOTE — PROGRESS NOTES
"Subjective:     33 year old  at 40w0d presents for routine prenatal visit.           Patient concerns:  Feeling well overall. Disappointed she has not had the baby yet. No regular contractions or significant change in the cramping she has been feeling. Denies vaginal bleeding, discharge or leakage of fluid. Report +fetal movement.  No HA, vision changes, ruq/epigastric pain.      Desires SVE today. Declines membrane sweep.    Agreeable to BPP at 41 weeks and would like to schedule IOL at 41+3. Really hoping she goes into spontaneous labor before that time.    Also concerned about weight gain- pt has had 28lb weight gain overall which is WNL for her starting BMI. However, is the same weight since 6. Plan growth u/s with BPP next week (discussed accuracy may be affected by advanced gestational age and low station of baby).    Objective:  Vitals:    18 0821   BP: 103/67   BP Location: Left arm   Patient Position: Chair   Pulse: 83   Weight: 67.2 kg (148 lb 1.6 oz)   Height: 1.6 m (5' 3\")      See OB flowsheet    SVE 2-3/60/-1, ant/med, cephalic    Assessment/Plan     Encounter Diagnosis   Name Primary?     Encounter for supervision of other normal pregnancy,  S CN care Yes     Orders Placed This Encounter   Procedures     BPP (Single) w/out NST (In Clinic)     Growth Ultrasound 63134     - Reviewed why/how to contact provider if headache/visual changes/RUQ or epigastric pain, decreased fetal movement, vaginal bleeding, leakage of fluid or strong/regular contractions.   Patient education/orders or handouts today:  Sign/symptoms of labor, When to call for labor or other concerns, Postdates testing discussion, BPP, NST and Induction of labor    - Reviewed late term testing including BPP => 41 weeks and rationale for induction of labor based on results.   Patient desires BPP at 41 weeks and IOL at 41+3.     Growth ultrasound planned with BPP next week d/t no maternal weight gain since 6.   BPP, " growth ultrasound and PRECIOUS at 41 weeks, Friday 7/6/18.  IOL scheduled at 0900 on Monday, 7/9/18.     NIRAV Luis, CORINNA

## 2018-06-29 NOTE — LETTER
"2018       RE: Enid Mcbride  [address] [place] [address]     Dear Colleague,    Thank you for referring your patient, Enid Mcbride, to the [facility] at [facility]. Please see a copy of my visit note below.    Subjective:     33 year old  at 40w0d presents for routine prenatal visit.           Patient concerns:  Feeling well overall. Disappointed she has not had the baby yet. No regular contractions or significant change in the cramping she has been feeling. Denies vaginal bleeding, discharge or leakage of fluid. Report +fetal movement.  No HA, vision changes, ruq/epigastric pain.      Desires SVE today. Declines membrane sweep.    Agreeable to BPP at 41 weeks and would like to schedule IOL at 41+3. Really hoping she goes into spontaneous labor before that time.    Also concerned about weight gain- pt has had 28lb weight gain overall which is WNL for her starting BMI. However, is the same weight since 31+6. Plan growth u/s with BPP next week (discussed accuracy may be affected by advanced gestational age and low station of baby).    Objective:  Vitals:    18 0821   BP: 103/67   BP Location: Left arm   Patient Position: Chair   Pulse: 83   Weight: 67.2 kg (148 lb 1.6 oz)   Height: 1.6 m (5' 3\")      See OB flowsheet    SVE 2-3/60/-1, ant/med, cephalic    Assessment/Plan     Encounter Diagnosis   Name Primary?     Encounter for supervision of other normal pregnancy,  S CN care Yes     Orders Placed This Encounter   Procedures     BPP (Single) w/out NST (In Clinic)     Growth Ultrasound [address]607     - Reviewed why/how to contact provider if headache/visual changes/RUQ or epigastric pain, decreased fetal movement, vaginal bleeding, leakage of fluid or strong/regular contractions.   Patient education/orders or handouts today:  Sign/symptoms of labor, When to call for labor or other concerns, Postdates testing discussion, " BPP, NST and Induction of labor    - Reviewed late term testing including BPP => 41 weeks and rationale for induction of labor based on results.   Patient desires BPP at 41 weeks and IOL at 41+3.     Growth ultrasound planned with BPP next week d/t no maternal weight gain since 31+6.   BPP, growth ultrasound and PRECIOUS at 41 weeks, Friday 7/6/18.  IOL scheduled at 0900 on Monday, 7/9/18.     NIRAV Luis CNM        Again, thank you for allowing me to participate in the care of your patient.      Sincerely,    Nicol Willams CNM

## 2018-06-29 NOTE — MR AVS SNAPSHOT
After Visit Summary   2018    Enid Mcbride    MRN: [id number]           Patient Information     Date Of Birth          1985        Visit Information        Provider Department      2018 8:15 AM Nicol Willams CNM [facility]        Today's Diagnoses     Encounter for supervision of other normal pregnancy,  [facility] care    -  1       Follow-ups after your visit        Follow-up notes from your care team     Return in about 1 week (around 2018) for BPP, growth and PRECIOUS.      Future tests that were ordered for you today     Open Future Orders        Priority Expected Expires Ordered    BPP (Single) w/out NST (In Clinic) Routine  10/27/2018 2018    Growth Ultrasound 83781 Routine  10/27/2018 2018            Who to contact     Please call your clinic at [phone] to:    Ask questions about your health    Make or cancel appointments    Discuss your medicines    Learn about your test results    Speak to your doctor            Additional Information About Your Visit        MyChart Information     [employer] gives you secure access to your electronic health record. If you see a primary care provider, you can also send messages to your care team and make appointments. If you have questions, please call your primary care clinic.  If you do not have a primary care provider, please call [phone] and they will assist you.      [employer] is an electronic gateway that provides easy, online access to your medical records. With [employer], you can request a clinic appointment, read your test results, renew a prescription or communicate with your care team.     To access your existing account, please contact your [facility] or call [phone] for assistance.        Care EveryWhere ID     This is your Care EveryWhere ID. This could be used by other organizations to access your [facility]  "records  [id number]        Your Vitals Were     Pulse Height Last Period BMI (Body Mass Index)          83 1.6 m (5' 3\") 03/14/2017 26.23 kg/m2         Blood Pressure from Last 3 Encounters:   06/29/18 103/67   06/22/18 109/70   06/15/18 101/67    Weight from Last 3 Encounters:   06/29/18 67.2 kg (148 lb 1.6 oz)   06/22/18 67.9 kg (149 lb 11.2 oz)   06/15/18 68.2 kg (150 lb 6.4 oz)               Primary Care Provider Fax #    Physician No Ref-Primary [phone]       No address on file        Equal Access to Services     YANELI STEARNS : Fidel Weiss, ara toro, adamaris bey, eb irene. So [facility] [phone].    ATENCIÓN: Si habla español, tiene a prieto disposición servicios gratuitos de asistencia lingüística. Llame al [phone].    We comply with applicable federal civil rights laws and [place] laws. We do not discriminate on the basis of race, color, national origin, age, disability, sex, sexual orientation, or gender identity.            Thank you!     Thank you for choosing [facility]  for your care. Our goal is always to provide you with excellent care. Hearing back from our patients is one way we can continue to improve our services. Please take a few minutes to complete the written survey that you may receive in the mail after your visit with us. Thank you!             Your Updated Medication List - Protect others around you: Learn how to safely use, store and throw away your medicines at www.disposemymeds.org.          This list is accurate as of 6/29/18  8:50 AM.  Always use your most recent med list.                   Brand Name Dispense Instructions for use Diagnosis    breast pump Misc     1 each    1 each daily as needed    Lactation disorder       [employer] PRENATAL 28-0.8 MG Tabs      Take 1 tablet by mouth        order for DME     1 each    Maternity Belt order    Pelvic pain in female         "

## 2018-06-30 ENCOUNTER — HOSPITAL ENCOUNTER (INPATIENT)
Facility: CLINIC | Age: 33
LOS: 2 days | Discharge: HOME HEALTH | End: 2018-07-02
Attending: ADVANCED PRACTICE MIDWIFE | Admitting: ADVANCED PRACTICE MIDWIFE
Payer: COMMERCIAL

## 2018-06-30 PROBLEM — Z36.89 ENCOUNTER FOR TRIAGE IN PREGNANT PATIENT: Status: ACTIVE | Noted: 2018-06-30

## 2018-06-30 LAB
ABO + RH BLD: NORMAL
ABO + RH BLD: NORMAL
BLD GP AB SCN SERPL QL: NORMAL
BLOOD BANK CMNT PATIENT-IMP: NORMAL
HGB BLD-MCNC: 11 G/DL (ref 11.7–15.7)
PLATELET # BLD AUTO: 163 10E9/L (ref 150–450)
SPECIMEN EXP DATE BLD: NORMAL

## 2018-06-30 PROCEDURE — 25000128 H RX IP 250 OP 636: Performed by: ADVANCED PRACTICE MIDWIFE

## 2018-06-30 PROCEDURE — G0463 HOSPITAL OUTPT CLINIC VISIT: HCPCS | Mod: 25

## 2018-06-30 PROCEDURE — 59025 FETAL NON-STRESS TEST: CPT

## 2018-06-30 PROCEDURE — 86780 TREPONEMA PALLIDUM: CPT | Performed by: ADVANCED PRACTICE MIDWIFE

## 2018-06-30 PROCEDURE — 86850 RBC ANTIBODY SCREEN: CPT | Performed by: ADVANCED PRACTICE MIDWIFE

## 2018-06-30 PROCEDURE — 25000125 ZZHC RX 250: Performed by: ADVANCED PRACTICE MIDWIFE

## 2018-06-30 PROCEDURE — 86900 BLOOD TYPING SEROLOGIC ABO: CPT | Performed by: ADVANCED PRACTICE MIDWIFE

## 2018-06-30 PROCEDURE — 86901 BLOOD TYPING SEROLOGIC RH(D): CPT | Performed by: ADVANCED PRACTICE MIDWIFE

## 2018-06-30 PROCEDURE — 12000030 ZZH R&B OB INTERMEDIATE UMMC

## 2018-06-30 PROCEDURE — 85049 AUTOMATED PLATELET COUNT: CPT | Performed by: ADVANCED PRACTICE MIDWIFE

## 2018-06-30 PROCEDURE — 3E033VJ INTRODUCTION OF OTHER HORMONE INTO PERIPHERAL VEIN, PERCUTANEOUS APPROACH: ICD-10-PCS | Performed by: ADVANCED PRACTICE MIDWIFE

## 2018-06-30 PROCEDURE — 85018 HEMOGLOBIN: CPT | Performed by: ADVANCED PRACTICE MIDWIFE

## 2018-06-30 RX ORDER — LIDOCAINE 40 MG/G
CREAM TOPICAL
Status: DISCONTINUED | OUTPATIENT
Start: 2018-06-30 | End: 2018-07-01

## 2018-06-30 RX ORDER — OXYTOCIN/0.9 % SODIUM CHLORIDE 30/500 ML
1-24 PLASTIC BAG, INJECTION (ML) INTRAVENOUS CONTINUOUS
Status: DISCONTINUED | OUTPATIENT
Start: 2018-06-30 | End: 2018-07-01

## 2018-06-30 RX ORDER — NALOXONE HYDROCHLORIDE 0.4 MG/ML
.1-.4 INJECTION, SOLUTION INTRAMUSCULAR; INTRAVENOUS; SUBCUTANEOUS
Status: DISCONTINUED | OUTPATIENT
Start: 2018-06-30 | End: 2018-07-01

## 2018-06-30 RX ORDER — IBUPROFEN 800 MG/1
800 TABLET, FILM COATED ORAL
Status: DISCONTINUED | OUTPATIENT
Start: 2018-06-30 | End: 2018-07-01

## 2018-06-30 RX ORDER — METHYLERGONOVINE MALEATE 0.2 MG/ML
200 INJECTION INTRAVENOUS
Status: DISCONTINUED | OUTPATIENT
Start: 2018-06-30 | End: 2018-07-01

## 2018-06-30 RX ORDER — OXYCODONE AND ACETAMINOPHEN 5; 325 MG/1; MG/1
1 TABLET ORAL
Status: DISCONTINUED | OUTPATIENT
Start: 2018-06-30 | End: 2018-07-01

## 2018-06-30 RX ORDER — OXYTOCIN/0.9 % SODIUM CHLORIDE 30/500 ML
100-340 PLASTIC BAG, INJECTION (ML) INTRAVENOUS CONTINUOUS PRN
Status: DISCONTINUED | OUTPATIENT
Start: 2018-06-30 | End: 2018-07-01

## 2018-06-30 RX ORDER — ACETAMINOPHEN 325 MG/1
650 TABLET ORAL EVERY 4 HOURS PRN
Status: DISCONTINUED | OUTPATIENT
Start: 2018-06-30 | End: 2018-07-01

## 2018-06-30 RX ORDER — CARBOPROST TROMETHAMINE 250 UG/ML
250 INJECTION, SOLUTION INTRAMUSCULAR
Status: DISCONTINUED | OUTPATIENT
Start: 2018-06-30 | End: 2018-07-01

## 2018-06-30 RX ORDER — SODIUM CHLORIDE, SODIUM LACTATE, POTASSIUM CHLORIDE, CALCIUM CHLORIDE 600; 310; 30; 20 MG/100ML; MG/100ML; MG/100ML; MG/100ML
INJECTION, SOLUTION INTRAVENOUS CONTINUOUS
Status: DISCONTINUED | OUTPATIENT
Start: 2018-06-30 | End: 2018-07-01

## 2018-06-30 RX ORDER — ONDANSETRON 2 MG/ML
4 INJECTION INTRAMUSCULAR; INTRAVENOUS EVERY 6 HOURS PRN
Status: DISCONTINUED | OUTPATIENT
Start: 2018-06-30 | End: 2018-06-30

## 2018-06-30 RX ORDER — PENICILLIN G POTASSIUM 5000000 [IU]/1
5 INJECTION, POWDER, FOR SOLUTION INTRAMUSCULAR; INTRAVENOUS ONCE
Status: COMPLETED | OUTPATIENT
Start: 2018-06-30 | End: 2018-07-01

## 2018-06-30 RX ORDER — ONDANSETRON 2 MG/ML
4 INJECTION INTRAMUSCULAR; INTRAVENOUS EVERY 6 HOURS PRN
Status: DISCONTINUED | OUTPATIENT
Start: 2018-06-30 | End: 2018-07-01

## 2018-06-30 RX ORDER — OXYTOCIN 10 [USP'U]/ML
10 INJECTION, SOLUTION INTRAMUSCULAR; INTRAVENOUS
Status: DISCONTINUED | OUTPATIENT
Start: 2018-06-30 | End: 2018-07-01

## 2018-06-30 RX ADMIN — PENICILLIN G POTASSIUM 5 MILLION UNITS: 5000000 POWDER, FOR SOLUTION INTRAMUSCULAR; INTRAPLEURAL; INTRATHECAL; INTRAVENOUS at 23:16

## 2018-06-30 RX ADMIN — SODIUM CHLORIDE, POTASSIUM CHLORIDE, SODIUM LACTATE AND CALCIUM CHLORIDE 500 ML: 600; 310; 30; 20 INJECTION, SOLUTION INTRAVENOUS at 22:46

## 2018-06-30 RX ADMIN — OXYTOCIN-SODIUM CHLORIDE 0.9% IV SOLN 30 UNIT/500ML 1 MILLI-UNITS/MIN: 30-0.9/5 SOLUTION at 23:24

## 2018-06-30 NOTE — IP AVS SNAPSHOT
[facility]    [address] [address]    Phone:  [phone]                                       After Visit Summary   6/30/2018    Enid Mcbride    MRN: [id number]           After Visit Summary Signature Page     I have received my discharge instructions, and my questions have been answered. I have discussed any challenges I see with this plan with the nurse or doctor.    ..........................................................................................................................................  Patient/Patient Representative Signature      ..........................................................................................................................................  Patient Representative Print Name and Relationship to Patient    ..................................................               ................................................  Date                                            Time    ..........................................................................................................................................  Reviewed by Signature/Title    ...................................................              ..............................................  Date                                                            Time

## 2018-06-30 NOTE — IP AVS SNAPSHOT
MRN:[id number]                      After Visit Summary   6/30/2018    Enid Mcbride    MRN: [id number]           Thank you!     Thank you for choosing [facility] for your care. Our goal is always to provide you with excellent care. Hearing back from our patients is one way we can continue to improve our services. Please take a few minutes to complete the written survey that you may receive in the mail after you visit with us. Thank you!        Patient Information     Date Of Birth          1985        About your hospital stay     You were admitted on:  June 30, 2018 You last received care in the:  [facility]    You were discharged on:  July 2, 2018        Reason for your hospital stay       Maternity care                  Who to Call     For medical emergencies, please call 911.  For non-urgent questions about your medical care, please call your primary care provider or clinic, None          Attending Provider     Provider Specialty    Earline Velasco APRN [facility]    Kaylynn Moser APRN [facility]       Primary Care Provider Fax #    Physician No Ref-Primary [phone]      After Care Instructions     Activity       Review discharge instructions            Diet       Resume previous diet            Discharge Instructions - Postpartum visit       Schedule postpartum visit with your provider and return to clinic in 6 weeks.                  Further instructions from your care team       Postpartum Vaginal Delivery Instructions    Activity       Ask family and friends for help when you need it.    Do not place anything in your vagina for 6 weeks.    You are not restricted on other activities, but take it easy for a few weeks to allow your body to recover from delivery.  You are able to do any activities you feel up to that point.    No driving until you have stopped taking your pain medications (usually two weeks after delivery).     Call your health care provider if you have any  of these symptoms:       Increased pain, swelling, redness, or fluid around your stiches from an episiotomy or perineal tear.    A fever above 100.4 F (38 C) with or without chills when placing a thermometer under your tongue.    You soak a sanitary pad with blood within 1 hour, or you see blood clots larger than a golf ball.    Bleeding that lasts more than 6 weeks.    Vaginal discharge that smells bad.    Severe pain, cramping or tenderness in your lower belly area.    A need to urinate more frequently (use the toilet more often), more urgently (use the toilet very quickly), or it burns when you urinate.    Nausea and vomiting.    Redness, swelling or pain around a vein in your leg.    Problems breastfeeding or a red or painful area on your breast.    Chest pain and cough or are gasping for air.    Problems coping with sadness, anxiety, or depression.  If you have any concerns about hurting yourself or the baby, call your provider immediately.     You have questions or concerns after you return home.     Keep your hands clean:  Always wash your hands before touching your perineal area and stitches.  This helps reduce your risk of infection.  If your hands aren't dirty, you may use an alcohol hand-rub to clean your hands. Keep your nails clean and short.        Pending Results     No orders found from 6/28/2018 to 7/1/2018.            Statement of Approval     Ordered          07/02/18 1619  I have reviewed and agree with all the recommendations and orders detailed in this document.  EFFECTIVE NOW     Approved and electronically signed by:  Dawn Cm APRN CNM             Admission Information     Date & Time Provider Department Dept. Phone    6/30/2018 Kaylynn Moser APRN CNM [facility] [phone]      Your Vitals Were     Blood Pressure Pulse Temperature Respirations Last Period Pulse Oximetry    92/73 77 97.7  F (36.5  C) (Oral) 20 03/14/2017 100%      MyChart Information     MyChart gives  you secure access to your electronic health record. If you see a primary care provider, you can also send messages to your care team and make appointments. If you have questions, please call your primary care clinic.  If you do not have a primary care provider, please call [phone] and they will assist you.        Care EveryWhere ID     This is your Care EveryWhere ID. This could be used by other organizations to access your [facility] medical records  [id number]        Equal Access to Services     YANELI STEARNS : Hadii neftaly de hadasho Soomaali, waaxda luqadaha, qaybta kaalmada adeegyada, eb honeycutt . So [facility] [phone].    ATENCIÓN: Si habla español, tiene a prieto disposición servicios gratuitos de asistencia lingüística. [facility] [phone].    We comply with applicable federal civil rights laws and [place] laws. We do not discriminate on the basis of race, color, national origin, age, disability, sex, sexual orientation, or gender identity.               Review of your medicines      CONTINUE these medicines which have NOT CHANGED        Dose / Directions    breast pump Misc   Used for:  Lactation disorder        Dose:  1 each   1 each daily as needed   Quantity:  1 each   Refills:  0       CVS PRENATAL 28-0.8 MG Tabs        Dose:  1 tablet   Take 1 tablet by mouth   Refills:  0       order for DME   Used for:  Pelvic pain in female        Maternity Belt order   Quantity:  1 each   Refills:  0                Protect others around you: Learn how to safely use, store and throw away your medicines at www.disposemymeds.org.             Medication List: This is a list of all your medications and when to take them. Check marks below indicate your daily home schedule. Keep this list as a reference.      Medications           Morning Afternoon Evening Bedtime As Needed    breast pump Misc   1 each daily as needed                                [employer]S PRENATAL 28-0.8 MG Tabs   Take 1 tablet  by mouth                                order for DME   Maternity Belt order

## 2018-07-01 ENCOUNTER — ANESTHESIA (OUTPATIENT)
Dept: OBGYN | Facility: CLINIC | Age: 33
End: 2018-07-01
Payer: COMMERCIAL

## 2018-07-01 ENCOUNTER — ANESTHESIA EVENT (OUTPATIENT)
Dept: OBGYN | Facility: CLINIC | Age: 33
End: 2018-07-01
Payer: COMMERCIAL

## 2018-07-01 LAB — T PALLIDUM AB SER QL: NONREACTIVE

## 2018-07-01 PROCEDURE — 3E0R3BZ INTRODUCTION OF ANESTHETIC AGENT INTO SPINAL CANAL, PERCUTANEOUS APPROACH: ICD-10-PCS | Performed by: ANESTHESIOLOGY

## 2018-07-01 PROCEDURE — 25000128 H RX IP 250 OP 636

## 2018-07-01 PROCEDURE — 12000030 ZZH R&B OB INTERMEDIATE UMMC

## 2018-07-01 PROCEDURE — 40000977 ZZH STATISTIC ATTENDANCE AT DELIVERY

## 2018-07-01 PROCEDURE — 72200001 ZZH LABOR CARE VAGINAL DELIVERY SINGLE

## 2018-07-01 PROCEDURE — 00HU33Z INSERTION OF INFUSION DEVICE INTO SPINAL CANAL, PERCUTANEOUS APPROACH: ICD-10-PCS | Performed by: ANESTHESIOLOGY

## 2018-07-01 PROCEDURE — 0KQM0ZZ REPAIR PERINEUM MUSCLE, OPEN APPROACH: ICD-10-PCS | Performed by: ADVANCED PRACTICE MIDWIFE

## 2018-07-01 PROCEDURE — 25000128 H RX IP 250 OP 636: Performed by: ADVANCED PRACTICE MIDWIFE

## 2018-07-01 PROCEDURE — 25000125 ZZHC RX 250: Performed by: ANESTHESIOLOGY

## 2018-07-01 PROCEDURE — 10907ZC DRAINAGE OF AMNIOTIC FLUID, THERAPEUTIC FROM PRODUCTS OF CONCEPTION, VIA NATURAL OR ARTIFICIAL OPENING: ICD-10-PCS | Performed by: ADVANCED PRACTICE MIDWIFE

## 2018-07-01 PROCEDURE — 25000132 ZZH RX MED GY IP 250 OP 250 PS 637: Performed by: ADVANCED PRACTICE MIDWIFE

## 2018-07-01 RX ORDER — ACETAMINOPHEN 325 MG/1
650 TABLET ORAL EVERY 4 HOURS PRN
Status: DISCONTINUED | OUTPATIENT
Start: 2018-07-01 | End: 2018-07-02 | Stop reason: HOSPADM

## 2018-07-01 RX ORDER — NALOXONE HYDROCHLORIDE 0.4 MG/ML
.1-.4 INJECTION, SOLUTION INTRAMUSCULAR; INTRAVENOUS; SUBCUTANEOUS
Status: DISCONTINUED | OUTPATIENT
Start: 2018-07-01 | End: 2018-07-01

## 2018-07-01 RX ORDER — LIDOCAINE HYDROCHLORIDE AND EPINEPHRINE 15; 5 MG/ML; UG/ML
INJECTION, SOLUTION EPIDURAL PRN
Status: DISCONTINUED | OUTPATIENT
Start: 2018-07-01 | End: 2018-07-01 | Stop reason: HOSPADM

## 2018-07-01 RX ORDER — BUPIVACAINE HYDROCHLORIDE 2.5 MG/ML
INJECTION, SOLUTION EPIDURAL; INFILTRATION; INTRACAUDAL PRN
Status: DISCONTINUED | OUTPATIENT
Start: 2018-07-01 | End: 2018-07-01 | Stop reason: HOSPADM

## 2018-07-01 RX ORDER — OXYTOCIN/0.9 % SODIUM CHLORIDE 30/500 ML
100 PLASTIC BAG, INJECTION (ML) INTRAVENOUS CONTINUOUS
Status: DISCONTINUED | OUTPATIENT
Start: 2018-07-01 | End: 2018-07-02 | Stop reason: HOSPADM

## 2018-07-01 RX ORDER — AMOXICILLIN 250 MG
1 CAPSULE ORAL 2 TIMES DAILY
Status: DISCONTINUED | OUTPATIENT
Start: 2018-07-01 | End: 2018-07-02 | Stop reason: HOSPADM

## 2018-07-01 RX ORDER — OXYTOCIN 10 [USP'U]/ML
INJECTION, SOLUTION INTRAMUSCULAR; INTRAVENOUS
Status: DISCONTINUED
Start: 2018-07-01 | End: 2018-07-01 | Stop reason: WASHOUT

## 2018-07-01 RX ORDER — LANOLIN 100 %
OINTMENT (GRAM) TOPICAL
Status: DISCONTINUED | OUTPATIENT
Start: 2018-07-01 | End: 2018-07-02 | Stop reason: HOSPADM

## 2018-07-01 RX ORDER — NALBUPHINE HYDROCHLORIDE 10 MG/ML
2.5-5 INJECTION, SOLUTION INTRAMUSCULAR; INTRAVENOUS; SUBCUTANEOUS EVERY 6 HOURS PRN
Status: DISCONTINUED | OUTPATIENT
Start: 2018-07-01 | End: 2018-07-01

## 2018-07-01 RX ORDER — EPHEDRINE SULFATE 50 MG/ML
INJECTION, SOLUTION INTRAMUSCULAR; INTRAVENOUS; SUBCUTANEOUS
Status: DISCONTINUED
Start: 2018-07-01 | End: 2018-07-01 | Stop reason: WASHOUT

## 2018-07-01 RX ORDER — EPHEDRINE SULFATE 50 MG/ML
5 INJECTION, SOLUTION INTRAMUSCULAR; INTRAVENOUS; SUBCUTANEOUS
Status: DISCONTINUED | OUTPATIENT
Start: 2018-07-01 | End: 2018-07-01

## 2018-07-01 RX ORDER — LIDOCAINE HYDROCHLORIDE 10 MG/ML
INJECTION, SOLUTION EPIDURAL; INFILTRATION; INTRACAUDAL; PERINEURAL PRN
Status: DISCONTINUED | OUTPATIENT
Start: 2018-07-01 | End: 2018-07-01 | Stop reason: HOSPADM

## 2018-07-01 RX ORDER — BISACODYL 10 MG
10 SUPPOSITORY, RECTAL RECTAL DAILY PRN
Status: DISCONTINUED | OUTPATIENT
Start: 2018-07-03 | End: 2018-07-02 | Stop reason: HOSPADM

## 2018-07-01 RX ORDER — MISOPROSTOL 200 UG/1
TABLET ORAL
Status: DISCONTINUED
Start: 2018-07-01 | End: 2018-07-01 | Stop reason: WASHOUT

## 2018-07-01 RX ORDER — AMOXICILLIN 250 MG
2 CAPSULE ORAL 2 TIMES DAILY
Status: DISCONTINUED | OUTPATIENT
Start: 2018-07-01 | End: 2018-07-02 | Stop reason: HOSPADM

## 2018-07-01 RX ORDER — HYDROCORTISONE 2.5 %
CREAM (GRAM) TOPICAL 3 TIMES DAILY PRN
Status: DISCONTINUED | OUTPATIENT
Start: 2018-07-01 | End: 2018-07-02 | Stop reason: HOSPADM

## 2018-07-01 RX ORDER — FENTANYL/BUPIVACAINE/NS/PF 2-1250MCG
PLASTIC BAG, INJECTION (ML) INJECTION
Status: COMPLETED
Start: 2018-07-01 | End: 2018-07-01

## 2018-07-01 RX ORDER — IBUPROFEN 800 MG/1
800 TABLET, FILM COATED ORAL EVERY 6 HOURS PRN
Status: DISCONTINUED | OUTPATIENT
Start: 2018-07-01 | End: 2018-07-02 | Stop reason: HOSPADM

## 2018-07-01 RX ORDER — OXYTOCIN/0.9 % SODIUM CHLORIDE 30/500 ML
PLASTIC BAG, INJECTION (ML) INTRAVENOUS
Status: DISCONTINUED
Start: 2018-07-01 | End: 2018-07-01 | Stop reason: WASHOUT

## 2018-07-01 RX ORDER — OXYTOCIN/0.9 % SODIUM CHLORIDE 30/500 ML
340 PLASTIC BAG, INJECTION (ML) INTRAVENOUS CONTINUOUS PRN
Status: DISCONTINUED | OUTPATIENT
Start: 2018-07-01 | End: 2018-07-02 | Stop reason: HOSPADM

## 2018-07-01 RX ORDER — NALOXONE HYDROCHLORIDE 0.4 MG/ML
.1-.4 INJECTION, SOLUTION INTRAMUSCULAR; INTRAVENOUS; SUBCUTANEOUS
Status: DISCONTINUED | OUTPATIENT
Start: 2018-07-01 | End: 2018-07-02 | Stop reason: HOSPADM

## 2018-07-01 RX ORDER — LIDOCAINE HYDROCHLORIDE 10 MG/ML
INJECTION, SOLUTION INFILTRATION; PERINEURAL
Status: DISCONTINUED
Start: 2018-07-01 | End: 2018-07-01 | Stop reason: WASHOUT

## 2018-07-01 RX ORDER — OXYTOCIN 10 [USP'U]/ML
10 INJECTION, SOLUTION INTRAMUSCULAR; INTRAVENOUS
Status: DISCONTINUED | OUTPATIENT
Start: 2018-07-01 | End: 2018-07-02 | Stop reason: HOSPADM

## 2018-07-01 RX ORDER — MISOPROSTOL 200 UG/1
400 TABLET ORAL
Status: DISCONTINUED | OUTPATIENT
Start: 2018-07-01 | End: 2018-07-02 | Stop reason: HOSPADM

## 2018-07-01 RX ADMIN — SODIUM CHLORIDE, POTASSIUM CHLORIDE, SODIUM LACTATE AND CALCIUM CHLORIDE: 600; 310; 30; 20 INJECTION, SOLUTION INTRAVENOUS at 07:01

## 2018-07-01 RX ADMIN — BUPIVACAINE HYDROCHLORIDE 1 ML: 2.5 INJECTION, SOLUTION EPIDURAL; INFILTRATION; INTRACAUDAL at 06:27

## 2018-07-01 RX ADMIN — IBUPROFEN 800 MG: 800 TABLET ORAL at 15:16

## 2018-07-01 RX ADMIN — IBUPROFEN 800 MG: 800 TABLET ORAL at 21:50

## 2018-07-01 RX ADMIN — IBUPROFEN 800 MG: 800 TABLET ORAL at 09:07

## 2018-07-01 RX ADMIN — Medication 2.5 MILLION UNITS: at 07:08

## 2018-07-01 RX ADMIN — SODIUM CHLORIDE, POTASSIUM CHLORIDE, SODIUM LACTATE AND CALCIUM CHLORIDE: 600; 310; 30; 20 INJECTION, SOLUTION INTRAVENOUS at 05:21

## 2018-07-01 RX ADMIN — LIDOCAINE HYDROCHLORIDE,EPINEPHRINE BITARTRATE 3 ML: 15; .005 INJECTION, SOLUTION EPIDURAL; INFILTRATION; INTRACAUDAL; PERINEURAL at 06:30

## 2018-07-01 RX ADMIN — ACETAMINOPHEN 650 MG: 325 TABLET, FILM COATED ORAL at 19:52

## 2018-07-01 RX ADMIN — Medication 10 ML/HR: at 06:35

## 2018-07-01 RX ADMIN — LIDOCAINE HYDROCHLORIDE 3 ML: 10 INJECTION, SOLUTION EPIDURAL; INFILTRATION; INTRACAUDAL; PERINEURAL at 06:44

## 2018-07-01 RX ADMIN — Medication 2.5 MILLION UNITS: at 02:54
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
## 2018-07-01 NOTE — PLAN OF CARE
Problem: Patient Care Overview  Goal: Plan of Care/Patient Progress Review  Outcome: Improving  Patient was uncomfortable when care was assumed, position changed and fetus didn't tolerated with late decels, midwife called in to the room, patient turned back to the back side, fetus recovered. Patient was shaky and not feeling well, midwife called into the room again about 30 minutes later, patient throw up. Tried to straight cath patient and the head of the fetus was visualized and patient was complete at 0814. Patient pushed for about 10 minutes and viable baby boy was born at 0828. NICU was present at delivery for Meconium. See delivery summary note by NNP. Patient stable and anticipate transfer to [facility] soon.
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
## 2018-07-01 NOTE — ANESTHESIA PROCEDURE NOTES
Combined Spinal/Epidural procedure note    Staff  Anesthesiologist:  ANN BALDERAS  Location:  OB  Timeout  patient identified, IV checked, risks and benefits discussed, informed consent, monitors and equipment checked and pre-op evaluation    Correct Patient: Yes    Correct Position: Yes    Correct Site: Yes    Correct Procedure: Yes    Procedure details  Procedure:  Combined Spinal/Epidural (CSE)  Sterile Prep: chloraprep, patient draped, mask and sterile gloves    Insertion site:  L4-5  Local skin infiltration:  1% lidocaine  amount (mL):  2  Approach:  Midline  Epidural Needle Type:  NoahTissuetechryan  Needle gauge (G):  17  Needle length (in):  3.5  Injection Technique:  LORT saline  ANTHONY at (cm):  5  Attempts:  1  Spinal Needle (G):  27  Spinal Needle Type:  Corey  Spinal Needle Length (in):  4 11/16  Catheter gauge (G):  19  Catheter threaded easily: Yes    Threaded to skin (cm) :  8  Threaded in epidural space (cm):  3  Paresthesias:  No  CSF with Epidural needle: No    CSF with Spinal needle: Yes    Aspiration negative for Heme or CSF: Yes    Test dose (mL):  3  Local anesthetic for test dose:  Lidocaine 1.5% w/ 1:200,000 epinephrine  Test dose negative for signs of intravascular, subdural or intrathecal injection: Yes     Injected 1 mL of 0.25% PF bupivacaine intrathecally.

## 2018-07-01 NOTE — ANESTHESIA PREPROCEDURE EVALUATION
Anesthesia Evaluation       history and physical reviewed .      No history of anesthetic complications          ROS/MED HX    ENT/Pulmonary:  - neg pulmonary ROS     Neurologic:  - neg neurologic ROS     Cardiovascular:  - neg cardiovascular ROS       METS/Exercise Tolerance:     Hematologic:         Musculoskeletal:         GI/Hepatic:  - neg GI/hepatic ROS       Renal/Genitourinary:         Endo:         Psychiatric:         Infectious Disease:         Malignancy:         Other:                     Physical Exam  Normal systems: cardiovascular, pulmonary and dental    Airway   Mallampati: II  TM distance: > 3 FB  Neck ROM: full  Mouth opening: > 3 cm    Dental     Cardiovascular       Pulmonary           neg OB ROS                 Anesthesia Plan      History & Physical Review      ASA Status:  .  OB Epidural Asa: 2 and emergent            Postoperative Care      Consents  Anesthetic plan, risks, benefits and alternatives discussed with:  Patient..

## 2018-07-01 NOTE — LACTATION NOTE
This note was copied from a baby's chart.  Resource RN met mother and infant Xavier. Infant is extremely spitty. Infant was resting skin to skin on mother. Infant had not been feeding well due to gagging when attempting to latch and spitting up amniotic fluid. Mother states that she would like to be treated like a first time mother. She has a daughter from another partner that is seven. Her daughter was born with a cleft palate and she pumped for 7 months, but never . Resource RN did education on hand expression with a demonstration and teach back. Drops achieved and spooned into infant's mouth. LC consult put in for tomorrow. Continue to monitor/assess and assist as needed.

## 2018-07-01 NOTE — PROGRESS NOTES
Labor progress note    S: Pt with anxiety about discomfort in labor as well as tingling in LE s/p epidural placement.  Is coping better now that epidural bolus is on board but still having sharp, stinging pain in back.   Had some nausea with discomfort before epidural that has resolved. FOB at bedside, very supportive.  Pt agreeable for positioning to assist with discomfort and facilitate fetal rotation/descent.     O:  Blood pressure 99/60, pulse 88, temperature 98.1  F (36.7  C), temperature source Oral, resp. rate 18, last menstrual period 2017, not currently breastfeeding.  General appearance: uncomfortable but coping better s/p epidural .  Contractions: Every 2-4 minutes. 60-80 seconds duration.  Palpate: strong.  Soft resting tone.   FHT: Baseline 130 with moderate variability. Accelerations not present. +non recurrent late decelerations present.  ROM: light meconium fluid . Membranes have been ruptured for 2 hours.  PELVIC EXAM:deferred      Pitocin- 12 mu/min.,  Antibiotics- PCN  IV at maintenance rate  Epidural in situ        # Pain Assessment:    - Enid is experiencing pain due to labor. Pain management was discussed with Enid and her significant other and the plan was created in a collaborative fashion.  Enid's response to the current recommendations: mixed response  Epidural is in situ, offering some relief  - Non-pharmacologic adjuvants: Heat, Massage, Meditation and continuous labor support from FOB and RN        A:  33 year old  with IUP @ 40w2d active labor   AROM <18 hours, meconium, afebrile  Fetal Heart Rate Tracing category two  GBS- positive- adequately treated  Anxiety    Patient Active Problem List   Diagnosis     Vitamin D deficiency     Vestibular migraine     Syrinx of spinal cord (H)     Family history of deafness and hearing loss     History of abnormal cervical Pap smear     Encounter for supervision of other normal pregnancy,  S CN care     Anxiety     Family  history of cleft palate     Positive GBS test     Encounter for triage in pregnant patient     Labor and delivery, indication for care         P:  - Frequent position changes to facilitate labor with epidural anesthesia. Pt assisted to throne position.  Agreeable at present to position changes [id number].   - Pain much improved with epidural anesthesia but pt with desire to have completely pain free labor, even small residual back discomfort is unwanted.  Reinforced pain management expectations with epidural anesthesia especially in regards to back pain. Reinforced positioning as imperative to facilitate fetal rotation/descent and manage discomfort.   - Continue intrauterine resuscitation efforts for category 2 FHR tracing.  Plan MD consult if persistent or worsening category 2 FHR tracing despite intrauterine resuscitation efforts and consider operative delivery prn if remote from delivery.   - NICU aware of meconium fluid, plan for delivery.   - Time spent addressing pt's anxiety.  Encouraged slow, deep meditative breathing with FOB support.  Sips of clears tolerated.  Reinforced OB team will update immediatly if any deviations from normal for fetal or maternal status, otherwise encourage pt to rest.   - Continue labor induction with Pitocin per protocol  - Continue Prophylactic IV antibiotic for positive GBS status.  - Reevaluate in 2 hours prn    Kaylynn GASTELUM CNM        ADDENDUM 7/1/2018 0735 - CORINNA Called to bedside for prolonged deceleration with maternal position change. FHR noted back to baseline with moderate variability.  Pt without rectal pressure. Will reassess SVE by 0900 and prn.  Kaylynn GASTELUM CNM

## 2018-07-01 NOTE — PROGRESS NOTES
Labor progress note    S:  Patient remains comfortable with contractions, reports mild cramping.    O:  Blood pressure 91/52, pulse 88, temperature 98.2  F (36.8  C), temperature source Oral, resp. rate 18, last menstrual period 2017, not currently breastfeeding.  General appearance: comfortable.  Contractions: Every 3-8 minutes. 30-70 seconds duration.  Palpate: cramping.  FHT: Baseline 115 with moderate variability. Accelerations are present. No decelerations present.  Periods of minimal variability resolve spontaneously.  ROM: not ruptured.   Pelvic exam: deferred    Pitocin- 3 mu/min.,  Antibiotics- PCNx1 dose      A:  IUP @ 40w2d IOL for category II tracing at term with favorable cervix   Fetal Heart rate tracing Category category one  GBS- positive  Patient Active Problem List   Diagnosis     Vitamin D deficiency     Vestibular migraine     Syrinx of spinal cord (H)     Family history of deafness and hearing loss     History of abnormal cervical Pap smear     Encounter for supervision of other normal pregnancy,  [facility] CNM care     Anxiety     Family history of cleft palate     Positive GBS test     Encounter for triage in pregnant patient     Labor and delivery, indication for care         P:  Prophylactic antibiotic for + GBS status  Anticipate   Labor induction with Pitocin   Encouraged sleep in early induction process  NIRAV Naploes CNM
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
## 2018-07-01 NOTE — PROVIDER NOTIFICATION
07/01/18 0804   Provider Notification   Provider Name/Title Kaylynn Moser CNM   Method of Notification Electronic Page   Request Evaluate in Person   Notification Reason Decels;Pain  (request for cervical exam)

## 2018-07-01 NOTE — PROGRESS NOTES
Labor progress note    S:  Patient has been able to rest for some of the night.  Now reports more discomfort in lower abdomen with strong cramping and pressure.  Requesting cervical exam and possible rupture of membranes to facilitate progress.  Would like to use Nitrous Oxide at the time of exam.      O:  Blood pressure 95/53, pulse 88, temperature 98.3  F (36.8  C), temperature source Oral, resp. rate 18, last menstrual period 2017, not currently breastfeeding.  General appearance: uncomfortable with contractions.  Contractions: Every 2-3 minutes. 60-70 seconds duration.  Palpate: moderate and strong.  FHT: Baseline 115 with moderate variability. Accelerations are present. No decelerations present.  ROM: BBOW noted with exam. Verbal consent obtained and amniotomy performed for meconium stained fluid.    Pelvic exam: 6/ 75%/ Mid/ average/ -1      Pitocin- 11 mu/min.,  Antibiotics- PCNx2 doses      A:  IUP @ 40w2d IOL for category 2 tracing with favorable cervix, good labor progress   Fetal Heart rate tracing Category category one since induction started  GBS- positive  Patient Active Problem List   Diagnosis     Vitamin D deficiency     Vestibular migraine     Syrinx of spinal cord (H)     Family history of deafness and hearing loss     History of abnormal cervical Pap smear     Encounter for supervision of other normal pregnancy,  [facility] CNM care     Anxiety     Family history of cleft palate     Positive GBS test     Encounter for triage in pregnant patient     Labor and delivery, indication for care         P:  comfort measures discussed: Nitrous Oxide as needed, encouraged hydrotherapy and upright mobility.  Prophylactic antibiotic for + GBS status  Anticipate   reevaluate in 2-4 hours/PRN  NICU aware of meconium stained fluid and will plan to attend delivery, discussed this with patient and .     NIRAV Napoles CNM

## 2018-07-01 NOTE — PLAN OF CARE
Problem: Patient Care Overview  Goal: Plan of Care/Patient Progress Review  Outcome: No Change  Patient transferred to room 7144 via wheelchair, accompanied by infant in arms and spouse.Patient's left leg is still numb from epidural. Assist x2 for transfer from wheelchair to bed. Bedside report rec'd from Sheela Ybarra double checked. Care assumed by this RN. Patient tolerated transfer and is stable.

## 2018-07-01 NOTE — L&D DELIVERY NOTE
Enid Mcbride is a 33 year old   at  40w2d presented to labor floor with c/o early labor, noted category 2 FHR tracing with periods of minimal variability and late decelerations. Diaz score favorable for multip so IOL recommended and started with pitocin after first dose of PCN prophylaxis for GBS infused. Pt progressed to 6cm and requested AROM of large bugling bag for moderate meconium fluid at 0453.  Requested and received epidural anesthesia with good relief. Category 2 FHR tracing with moderate variability throughout.  Attempt to ST by RN noted fetal head at +2 station. Pt pushed effectively with CNM coaching and using mirror to visualize bringing head to crown in 2 pushes.  NICU called to room for delivery.  FHR noted 60's with crown, pt encouraged to deliver and fetus was born with next contraction. Head delivered FATMATA  and restituted to LOT .  Nuchal cord x1, easily reduced at perinuem. Shoulders easily delivered under maternal effort iwith noted R compound hand.  Live male  delivered at 0828 over a perineal laceration under epidural anesthesia.  Spontaneous breath, vigorous cry, well flexed, HR>100. Infant directly to maternal abdomen, skin to skin. Assessed by NICU through the first apagar and allowed to remain with mother. Delayed cord clamping until pulsations cesased then clamped x2 and cut by FOB.   20 units of pitocin infusing via IV after baby.  Cord blood obtained for typing.  Cord segment obtained for gases.  Intact placenta spontaneously delivered via Foley at 0834.   3 vessel cord. Fundus firm @ U after massage.   Vagina and rectum inspected, found to be intact.  2nd degree perineal laceration repaired with 3-0 vicryl suture on a CT in the usual fashion with good pain control from epidural.  Hemostasis noted.    ST for ~200 clear yellow urine.  FF@U after massage and expression of two moderate size clots, lochia WNL.  Mother and infant stable, continued skin to skin.    Apgars  .  Weight 7lb6oz        Cord gases obtained but not sent d/t apgars .         Delivery Note  IUP at 40 weeks gestation delivered on 2018.     delivery of a viable Male infant.  Weight : 7lb6oz    Apgars of 9 at 1 minute and 9 at 5 minutes.  Labor was induced.  Medications administered  in labor:  Pain Rx Epidural; Antibiotics Yes: Adequate prophylaxis; Other None  Perineum: 2nd degree  Placenta-mechanism: spontaneous, intact,  with a 3 vessel cord. IV oxytocin was given.  Quantitative Blood Loss was 300.  Complications of pregnancy, labor and delivery: GBS with adequate prophylaxis  Birth attendants  Kaylynn Moser  APRN, CNM            Delivery Summary - No Cr  Delivery Summary    Enid Mcbride MRN# [id number]   Age: 33 year old YOB: 1985     Labor Event Times:    Labor Onset Date       Labor Onset Time    Dilation Complete Date    Dilation Complete Time       Start Pushing Date        Start Pushing Time            Labor Length:    1st Stage (hrs/min) 6.00 14.00   2nd Stage (hrs/min) 0.00 14.00   3rd Stage (hrs/min) 0.00 6.00       Labor Events:     Labor No   Rupture Date     Rupture Time     Rupture Type Artificial Rupture of Membranes   Fluid Color     Labor Type     Induction    Induction Indication         Augmentation    Labor Complications     Additional Complications     Management of Labor        Antibiotics     IV Antibiotic Given     Additional Management     Fetal Status Prior to  Delivery     Fetal Status Comments         Cervical Ripening:    Date     Time     Type         Delivery:    Episiotomy None   Local Anesthetic        Lacerations 2nd   Sponge Count Correct       Needle Count Correct     Final Count by:    Sutures     Blood loss (ml)    Packing Intentionally Left In     Number     Comments           Information for the patient's :  Zakia Mcbride [[id number]]       Delivery  2018 8:28 AM by  Vaginal, Spontaneous  "Delivery  Sex:  male Gestational Age: 40w2d  Delivery Clinician:     Living?:            APGARS  One minute Five minutes Ten minutes   Skin color:            Heart rate:            Grimace:            Muscle tone:            Breathing:            Totals: 9  9         Presentation/position:           Resuscitation and Interventions: Method:     Oxygen Type:     Intubation Time:   # of Attempts:     ETT Size:        Tracheal Suction:     Tracheal returns:      [place] Care at Delivery:  Called to delivery by Kaylynn Moser CNM for term infant with meconium stained amniotic fluid and fetal heart rate decelerations. Infant was vigorous at delivery with good tone and respiratory effort. Infant was immediately placed on mother's abdomen   for delayed cord clamping. Bulb suctioned aminta- and nasophyarnx for large amounts of meconium stained amniotic fluid. Infant remained vigorous with good color, tone, and respiratory effort. Dismissed by postpartum care team at 2 minutes of age. One mi  nute Apgar assigned by this author, five minute Apgar to be assigned by postpartum care team.  NIRAV Ng, CNP, NNP-BC 2018 8:34 AM          Cord information:     Disposition of cord blood:      Blood gases sent?    Complications:     Placenta: Delivered:           appearance.  Comments: Og.  Disposition: Hospital disposal   Measurements:  Weight: 7 lb 6.2 oz (3350 g)  Height: 19.5\"  Head circumference: 34.9 cm  Chest circumference:     Temperature:     Other providers:       Additional  information:  Forceps:    Verbal Informed Consent Obtained:       Alternative Labor Strategies Discussed:     Emergency Resources Available:       Type:       Accrued Pulling Time:       # of Pulls:      Position:     Fetal Station:       Indications:      Other Indications:     Operative Vaginal Delivery Brief Note Forceps:        Vacuum:    Verbal Informed Consent Obtained:     Alternative Labor Strategies Discussed:   "   Emergency Resources Available:     Type:      Accrued Pulling Time:       # of Pop-Offs:       # of Pulls:       Position:     Fetal Station:      Indications for Vacuum:       Other Indications:    Operative Vaginal Delivery Brief Note Vacuum:        Shoulder Dystocia Shoulder Dystocia    Fetal Tracing Prior to Delivery:  Category 2   Shoulder dystocia present?:  No                                            Breech:       : Type:     Indications for Primary:     Indications for Secondary:     Other Indications:        Observed anomalies     Output in Delivery Room: Stool

## 2018-07-01 NOTE — PLAN OF CARE
Problem: Patient Care Overview  Goal: Plan of Care/Patient Progress Review  Outcome: No Change  AFVSS. Patient with occasional contractions and occasional late decelerations. CNM at bedside to reevaluate patient. Updated re. late decelerations. Reviewed tracing. SVE now 4/50/-1. Plan per CNM is to admit to labor and delivery for induction of labor. Patient and spouse agree with plan. Will start IV, antibiotics and pitocin per orders. Continue to monitor closely.

## 2018-07-01 NOTE — PLAN OF CARE
Problem: Patient Care Overview  Goal: Plan of Care/Patient Progress Review  Outcome: Improving  Patient breathing well through contractions. Wanting to try nitrous oxide for contraction pain. Reviewed use of nitrous. Patient agreed and did well with nitrous, but states she doesn't think that she can continue on in labor. CNM called to bedside. Cervix unchanged. Patient requesting epidural. Epidural placed with minimal relief initially. Patient states feeling extreme lower back pain, not feeling contractions or pain in abdomen. Dr. Tavares back to bedside, gave epidural bolus. Patient now more comfortalble. States still feeling sharp back pain. Repositioned, now more comfortable.  Continue to monitor closely. Continue present cares.

## 2018-07-01 NOTE — PLAN OF CARE
Data: Enid Mcbride transferred to [facility] via wheelchair at 1100. Baby transferred via parent's arms.  Action: Receiving unit notified of transfer: Yes. Patient and family notified of room change. Report given to Tricia WEBB at 1120. Belongings sent to receiving unit. Accompanied by Registered Nurse. Oriented patient to surroundings. Call light within reach. ID bands double-checked with receiving RN.  Response: Patient tolerated transfer and is stable.

## 2018-07-02 VITALS
RESPIRATION RATE: 20 BRPM | OXYGEN SATURATION: 100 % | HEART RATE: 77 BPM | DIASTOLIC BLOOD PRESSURE: 73 MMHG | TEMPERATURE: 97.7 F | SYSTOLIC BLOOD PRESSURE: 92 MMHG

## 2018-07-02 LAB — HGB BLD-MCNC: 9.9 G/DL (ref 11.7–15.7)

## 2018-07-02 PROCEDURE — 25000132 ZZH RX MED GY IP 250 OP 250 PS 637: Performed by: ADVANCED PRACTICE MIDWIFE

## 2018-07-02 PROCEDURE — 36415 COLL VENOUS BLD VENIPUNCTURE: CPT | Performed by: ADVANCED PRACTICE MIDWIFE

## 2018-07-02 PROCEDURE — 85018 HEMOGLOBIN: CPT | Performed by: ADVANCED PRACTICE MIDWIFE

## 2018-07-02 RX ADMIN — ACETAMINOPHEN 650 MG: 325 TABLET, FILM COATED ORAL at 00:20

## 2018-07-02 RX ADMIN — IBUPROFEN 800 MG: 800 TABLET ORAL at 04:59

## 2018-07-02 RX ADMIN — IBUPROFEN 800 MG: 800 TABLET ORAL at 12:08

## 2018-07-02 NOTE — PROGRESS NOTES
"[facility]   SOCIAL WORK PROGRESS NOTE      DATA:     Enid is a 33 year old female admitted on June 30th in labor. She delivered a male (Crobsy) on July 1st. SW consult received due to reported anxiety and panic attacks during pregnancy and labor. SW met supportively with Enid and her significant other, Diogenes to introduce self, role, initiate assessment, and provide post partum mental health support resources. Enid was very short with writer noting that \"it has been non-stop in's/out's all morning.\" She asked writer if writer had any information for her. SW inquired about any previous h/o pp depression/anxiety/mood disorders. She denied and acknowledged a h/o anxiety and being connected with someone. She accepted Post Partum Support [place] information and acknowledged knowing how to reach out, if needed. SW relayed this information to nurse, Sofia. Nurse anticipates Mom and Baby will stay overnight one additional night to work on breastfeeding and with lactation consultant. SW will remain available should patient have any questions or need any additional support.     INTERVENTION:     1. Introduction of SW and role. Will remain available should Mom wish to speak or have any questions.   2. Attempted mental health assessment with resources provided.   3. Validation/acknowledgment of difficult situation.     ASSESSMENT:     Enid was eating while Dad, Diogenes attempted to sooth baby, who was crying. She appeared anxious and overwhelmed, appropriately so. Per nursing, she exhibits positive bonding and attachment.  is attentive and supportive.     PLAN:     Social work will continue to assess needs and provide ongoing psychosocial support and access to resources.      Eunice Kumar, TEZ, LICSW, OSW-C  Clinical    Pediatric Hematology Oncology   [facility]   Monday-Thursday   Phone: [phone]  Pager: [phone]    NO " LETTER

## 2018-07-02 NOTE — PROGRESS NOTES
"Pt upset this morning d/t too many visitor.  Feels like \"my door is a revolving door\". Informed mom that I would cluster cares and if mom needed anything for her to call me. Declined lab draw for the moment. Wants it later and will let me know when. Also declined lactation consult. Informed that Lactation consultant would only be available until noon today and would not be available tomorrow when pt anticipates discharge. Per  FOB, does not want visit from lactation consultant, states she (pt) is fine. (Pt talking on phone). Re-iterated that after 12pm, there would be no lactation consultant to see pt for today or tomorrow. Stated he understands.    "

## 2018-07-02 NOTE — DISCHARGE SUMMARY
[facility] Discharge Summary    Enid Mcbride MRN# [id number]   Age: 33 year old YOB: 1985     Date of Admission:  2018  Date of Discharge::  2018  Admitting Physician:  NIRAV Cowan CNM  Discharge Physician:  Dawn Cm CNM, MS      Home clinic: [facility]          Admission Diagnoses:   Maternity *BRITTANIE: 2018 Labor  Encounter for triage in pregnant patient  Labor and delivery, indication for care  Labor and delivery, indication for care  Vaginal delivery          Discharge Diagnosis:   Normal spontaneous vaginal delivery  Intrauterine pregnancy at 40 weeks gestation          Procedures:   Procedure(s): Repair of second degree perineal laceration       No procedures performed during this admission           Medications Prior to Admission:     Prescriptions Prior to Admission   Medication Sig Dispense Refill Last Dose     Prenatal Vit-Fe Fumarate-FA ([facility] PRENATAL) 28-0.8 MG TABS Take 1 tablet by mouth   Past Month at Unknown time     Misc. Devices (BREAST PUMP) MISC 1 each daily as needed 1 each 0      order for DME Maternity Belt order 1 each 0              Discharge Medications:     Current Discharge Medication List      CONTINUE these medications which have NOT CHANGED    Details   Prenatal Vit-Fe Fumarate-FA ([facility] PRENATAL) 28-0.8 MG TABS Take 1 tablet by mouth      Misc. Devices (BREAST PUMP) MISC 1 each daily as needed  Qty: 1 each, Refills: 0    Associated Diagnoses: Lactation disorder      order for DME Maternity Belt order  Qty: 1 each, Refills: 0    Associated Diagnoses: Pelvic pain in female                   Consultations:     See SW consult, brief          Brief History of Labor:   brice Mcbride is a 33 year old   at  40w2d presented to labor floor with c/o early labor, noted category 2 FHR tracing with periods of minimal variability and late decelerations. Diaz score favorable for multip so IOL recommended and started  with pitocin after first dose of PCN prophylaxis for GBS infused. Pt progressed to 6cm and requested AROM of large bugling bag for moderate meconium fluid at 0453.  Requested and received epidural anesthesia with good relief. Category 2 FHR tracing with moderate variability throughout.  Attempt to ST by RN noted fetal head at +2 station. Pt pushed effectively with CNM coaching and using mirror to visualize bringing head to crown in 2 pushes.  NICU called to room for delivery.  FHR noted 60's with crown, pt encouraged to deliver and fetus was born with next contraction. Head delivered FATMATA  and restituted to LOT .  Nuchal cord x1, easily reduced at perinuem. Shoulders easily delivered under maternal effort iwith noted R compound hand.  Live male  delivered at 0828 over a perineal laceration under epidural anesthesia.  Spontaneous breath, vigorous cry, well flexed, HR>100. Infant directly to maternal abdomen, skin to skin. Assessed by NICU through the first apagar and allowed to remain with mother. Delayed cord clamping until pulsations cesased then clamped x2 and cut by FOB.   20 units of pitocin infusing via IV after baby.  Cord blood obtained for typing.  Cord segment obtained for gases.  Intact placenta spontaneously delivered via Foley at 0834.   3 vessel cord. Fundus firm @ U after massage.   Vagina and rectum inspected, found to be intact.  2nd degree perineal laceration repaired with 3-0 vicryl suture on a CT in the usual fashion with good pain control from epidural.  Hemostasis noted.    ST for ~200 clear yellow urine.  FF@U after massage and expression of two moderate size clots, lochia WNL.  Mother and infant stable, continued skin to skin.    Apgars 9/9.  Weight 7lb6oz        Cord gases obtained but not sent d/t apgars 9/9.         Delivery Note  IUP at 40 weeks gestation delivered on 2018.     delivery of a viable Male infant.  Weight : 7lb6oz    Apgars of 9 at 1 minute and 9 at  5 minutes.  Labor was induced.  Medications administered  in labor:  Pain Rx Epidural; Antibiotics Yes: Adequate prophylaxis; Other None  Perineum: 2nd degree  Placenta-mechanism: spontaneous, intact,  with a 3 vessel cord. IV oxytocin was given.  Quantitative Blood Loss was 300.  Complications of pregnancy, labor and delivery: GBS with adequate prophylaxis  Birth attendants  Kaylynn Moser  APRTIFF, CNM             Assessment Day of Discharge      Breasts:soft, filling  Nipples:erect, intact  Fundus:ff u/-2  Abdomen:soft, NT  Lochia:min rubra, no clots  Perineum:well approx, slight edema and slight ecchymosis  Legs:NT           Hospital Course:   The patient's hospital course was unremarkable.  On discharge, her pain was well controlled. Vaginal bleeding is similar to peak menstrual flow.  Voiding without difficulty.  Ambulating well and tolerating a normal diet.  No fever.  Pumping and hand expression well, infant is still struggling w latch, but pt states it is improving and she knows what to work on.  Infant is stable.  No bowel movement yet.*  She was discharged on post-partum day #1.    Post-partum hemoglobin:   Hemoglobin   Date Value Ref Range Status   07/02/2018 9.9 (L) 11.7 - 15.7 g/dL Final      ASSESSMENT/PLAN:  Stable Post-partum day #1  Complications:anemic, plan for iron supplementation  Plan d/c home today  RTC 6 weeks  Teaching done: D/C Instructions: Nutrition/Activity, Engorgement Management, Birth Control Options, Warning Signs/When to Call: Excessive Bleeding, Infection, PP Depression, Kegals and Crunches, RTC Clinic for PP Appointment, PNV and Iron supplemenation    Postpartum warning s/s reviewed, including bleeding/clots, fever, mastitis, or depression    Birthcontrol planned:IUD Mirena, plan to insert at 8 wks postpartum  Current Discharge Medication List      CONTINUE these medications which have NOT CHANGED    Details   Prenatal Vit-Fe Fumarate-FA ([facility] PRENATAL) 28-0.8 MG TABS Take 1 tablet  by mouth      Misc. Devices (BREAST PUMP) MISC 1 each daily as needed  Qty: 1 each, Refills: 0    Associated Diagnoses: Lactation disorder      order for DME Maternity Belt order  Qty: 1 each, Refills: 0    Associated Diagnoses: Pelvic pain in female                  Discharge Instructions and Follow-Up:   Discharge diet: Regular   Discharge activity: Activity as tolerated   Discharge follow-up: Follow up with cnm in 6 weeks   Wound care: Drink plenty of fluids  Tub soaks BID           Discharge Disposition:   Discharged to home        NIRAV Fox CNM

## 2018-07-02 NOTE — PLAN OF CARE
Problem: Patient Care Overview  Goal: Plan of Care/Patient Progress Review  Data: Vital signs within normal limits. Postpartum checks within normal limits - see flow record. Patient eating and drinking normally. Patient able to empty bladder independently and is up ambulating. No apparent signs of infection. Perineum healing well. Patient performing self cares and is able to care for infant.  Action: Patient medicated during the shift for pain and cramping. See MAR. Patient reassessed within 1 hour after each medication and pain was improved - patient stated she was comfortable. Patient education done about pain management, hand expression importance with sleepy baby. See flow record.  Response: Positive attachment behaviors observed with infant. Support persons  present.   Plan: Anticipate discharge later today vs tomorrow.

## 2018-07-02 NOTE — PLAN OF CARE
Problem: Patient Care Overview  Goal: Plan of Care/Patient Progress Review  Outcome: Therapy, progress toward functional goals as expected  VSS. Afebrile. Up ad nicole. Voiding passing gas. Has trouble with latching baby to breast but getting better per pt. Hand expressing independently and feeding baby EBM if unable to latch. C/o minimal pain and requesting for pain meds only when she needs it. FOB here and very supportive, helping out with baby cares. Discharge instructions reviewed and given. Re-iterated mental health resources SW gave to pt this morning but pt's ed[place]=0. Discharged at 1735, ambulating per her request with baby in car seat and escorted by FOB and transport. All belongings sent with pt. PIV removed.

## 2018-08-14 ENCOUNTER — OFFICE VISIT (OUTPATIENT)
Dept: OBGYN | Facility: CLINIC | Age: 33
End: 2018-08-14
Attending: ADVANCED PRACTICE MIDWIFE
Payer: COMMERCIAL

## 2018-08-14 VITALS
SYSTOLIC BLOOD PRESSURE: 105 MMHG | HEART RATE: 61 BPM | BODY MASS INDEX: 23 KG/M2 | WEIGHT: 129.8 LBS | HEIGHT: 63 IN | DIASTOLIC BLOOD PRESSURE: 73 MMHG

## 2018-08-14 DIAGNOSIS — Z30.09 GENERAL COUNSELING AND ADVICE FOR CONTRACEPTIVE MANAGEMENT: ICD-10-CM

## 2018-08-14 DIAGNOSIS — F41.9 ANXIETY: ICD-10-CM

## 2018-08-14 DIAGNOSIS — Z87.42 HISTORY OF ABNORMAL CERVICAL PAP SMEAR: ICD-10-CM

## 2018-08-14 PROBLEM — Z36.89 ENCOUNTER FOR TRIAGE IN PREGNANT PATIENT: Status: RESOLVED | Noted: 2018-06-30 | Resolved: 2018-08-14

## 2018-08-14 ASSESSMENT — PAIN SCALES - GENERAL: PAINLEVEL: NO PAIN (0)

## 2018-08-14 NOTE — MR AVS SNAPSHOT
After Visit Summary   8/14/2018    Enid Mcbride    MRN: [id number]           Patient Information     Date Of Birth          1985        Visit Information        Provider Department      8/14/2018 3:15 PM Kaylynn Moser APRN CNM [facility]        Today's Diagnoses     Routine postpartum follow-up    -  1    Postpartum care and examination of lactating mother        General counseling and advice for contraceptive management        History of abnormal cervical Pap smear        Anxiety           Follow-ups after your visit        Follow-up notes from your care team     Discussed this visit Return in about 2 weeks (around 8/28/2018) for Birth Control - IUD or Nexplanon.      Who to contact     Please call your clinic at [phone] to:    Ask questions about your health    Make or cancel appointments    Discuss your medicines    Learn about your test results    Speak to your doctor            Additional Information About Your Visit        MyChart Information     [employer] gives you secure access to your electronic health record. If you see a primary care provider, you can also send messages to your care team and make appointments. If you have questions, please call your primary care clinic.  If you do not have a primary care provider, please call [phone] and they will assist you.      [employer] is an electronic gateway that provides easy, online access to your medical records. With [facility], you can request a clinic appointment, read your test results, renew a prescription or communicate with your care team.     To access your existing account, please contact your [facility] or call [phone] for assistance.        Care EveryWhere ID     This is your Care EveryWhere ID. This could be used by other organizations to access your [facility] medical records  [id number]        Your Vitals Were     Pulse Height Last Period BMI (Body Mass Index)  "         61 1.6 m (5' 3\") 03/14/2017 22.99 kg/m2         Blood Pressure from Last 3 Encounters:   08/14/18 105/73   07/02/18 92/73   06/29/18 103/67    Weight from Last 3 Encounters:   08/14/18 58.9 kg (129 lb 12.8 oz)   06/29/18 67.2 kg (148 lb 1.6 oz)   06/22/18 67.9 kg (149 lb 11.2 oz)              Today, you had the following     No orders found for display       Primary Care Provider Fax #    Physician No Ref-Primary [phone]       No address on file        Equal Access to Services     BRANDI STEARNS : Fidel Weiss, ara toro, adamaris bey, eb honeycutt . So [facility] [phone].    ATENCIÓN: Si habla español, tiene a prieto disposición servicios gratuitos de asistencia lingüística. Llame al [phone].    We comply with applicable federal civil rights laws and [place] laws. We do not discriminate on the basis of race, color, national origin, age, disability, sex, sexual orientation, or gender identity.            Thank you!     Thank you for choosing [facility]  for your care. Our goal is always to provide you with excellent care. Hearing back from our patients is one way we can continue to improve our services. Please take a few minutes to complete the written survey that you may receive in the mail after your visit with us. Thank you!             Your Updated Medication List - Protect others around you: Learn how to safely use, store and throw away your medicines at www.disposemymeds.org.          This list is accurate as of 8/14/18 11:59 PM.  Always use your most recent med list.                   Brand Name Dispense Instructions for use Diagnosis    breast pump Misc     1 each    1 each daily as needed    Lactation disorder       NUTRITIONAL SUPPLEMENT PO      Go lacta powder        order for DME     1 each    Maternity Belt order    Pelvic pain in female         "

## 2018-08-14 NOTE — LETTER
Date:August 20, 2018      Patient was self referred, no letter generated. Do not send.        [facility] Health Information

## 2018-08-14 NOTE — PROGRESS NOTES
Nursing Notes:   Annie Agarwal LPN  2018  3:23 PM  Signed  SUBJECTIVE:   Enid Mcbride is here for her 6-week postpartum checkup.     PHQ-9 score:    Hx of Abuse:       Delivery Date: 18.    Delivering provider:  Kaylynn Moser CNM.    Type of delivery:  .  Perineum:  tear, with repair.     Delivery complications: None  Infant gender:  boy, weight 7 pounds 6.2 oz.  Feeding Method:   and bottlefed  Complications reported with feeding:  none, infant thriving .    Bleeding:  None.  Duration:  2 weeks.  Menses resumed:  No  Bowel/Urinary problems:  No    Contraception Planned:  Discuss options  She  has not had intercourse since delivery..       ================================================================  Enid is a  female who presents for her 6 week postpartum visit.   - States she is doing well. Initially difficulties with breast feeding including a clogged duct, difficulty with latch, and decreased milk production. She did see a lactation consultant once for this problem. Because her , Xavier, was losing a significant amount of weight they recommended that she start bottle feeding and supplementing with formula. Presently she is doing mostly bottle feeding with about half pumped breast milk and half formula. She feels okay about this and is happy with it. She is pumped at bedtime, once overnight, and at least 2 times in the day. On phenigreek and planning to try brewers yeast.    -  Mood is great.   - Bleeding stopped after two weeks of spotting.   - Bladder and bowel function have returned to normal.   - They have not returned to intercourse yet and she is nervous about it, as she had burning with intercourse for her entire pregnancy. Infectious work up was negative prenatally.   - She has used the Mirena IUD for contraception in the past and loved it. Considering this or Nexplanon insertion in two weeks time.   - She stopped taking PNV and iron d/t nausea. Otherwise  "feeling well.    ROS: 10 point ROS neg other than the symptoms noted above in the HPI.     Pap NIL, HPV negative 3/2015  Pap Unsatisfactory sample 10/2014  Pap NIL 2013  Pap unsatisfactory sample 2012  Pap NIL 2012  Pap LSIL 2011, Colpo RACHEL 1  Pap NIL 2010    EXAM:  /73  Pulse 61  Ht 1.6 m (5' 3\")  Wt 58.9 kg (129 lb 12.8 oz)  LMP 2017  BMI 22.99 kg/m2    General: healthy, alert and no distress  Psych: negative for sleep disturbance, nervous breakdown, depression, thoughts of self-harm and thoughts of hurting someone else  PHQ9: 0; GAD7: 0  Breasts:  Lactating, Nipples intact with no lesions, Non-tender, No S/S of yeast or mastitis and no palpable masses.  Abdomen: Benign, Soft, flat, non-tender, No masses, organomegaly and Diastasis less than 1-2 FB  Incision:   None   Vulva:  Normal genitalia and Bartholin's, Urethra, [place]'s normal  Vagina:  normal with fair muscle tone, without discharge, rugated and epis/repair well healed  Cervix:  Multiparous, firm, closed, without palpable mass or lesion. No cervical motion tenderness.    Uterus:  fully involuted and non-tender    Adnexa:  Within normal limits and No masses, nodularity, tenderness  Recto-vaginal:   anus normal      ASSESSMENT:   Encounter Diagnoses   Name Primary?     Routine postpartum follow-up Yes     Postpartum care and examination of lactating mother      General counseling and advice for contraceptive management      History of abnormal cervical Pap smear      Anxiety       Normal postpartum exam after   Pregnancy was complicated by:  none.      PLAN:   Orders Placed This Encounter   Medications     Nutritional Supplements (NUTRITIONAL SUPPLEMENT PO)     Sig: Go lacta powder        Risks and benefits of prescribed medications discussed.  Medication instructions reviewed.  Contraception methods discussed: Mirena vs nexplanon  Signs and symptoms of postpartum depression/anxiety discussed and resources offered  Discussed " calcium intake, vitamins and supplements including Vitamin D  Encouraged restarted supplements if she can tolerate it.  Exercise encouraged  Kegel exercises recommended  Reviewed return to intercourse. Vaginal tissue pink and moist, recommended vaginal moisturizers, lubrication with intercourse, orgasm before penetration, and slow approach. Welcomed to return to clinic if she has further issues.  Reviewed abstinence or condom use until definitive contraception method in place.  Pap with HPV cotesting due 2020 per [facility]     Follow up in two weeks for contraception or PRN for questions and concerns.    I, Swapna Rebolledo, am serving as a scribe; to document services personally performed by  NIRAV Koch CNM based on data collection and the provider's statements to me.     Swapna Rebolledo, PA-S2    The encounter was performed by me and scribed by the PA-S2. The scribed note accurately reflects my personal services and decisions made by me.  Kaylynn GASTELUM, CORINNA

## 2018-08-14 NOTE — LETTER
2018       RE: Enid Mcbride  [address] [place] [address]     Dear Colleague,    Thank you for referring your patient, Enid Mcbride, to the [facility] at [facility]. Please see a copy of my visit note below.      Nursing Notes:   Annie Agarwal LPN  2018  3:23 PM  Signed  SUBJECTIVE:   Enid Mcbride is here for her 6-week postpartum checkup.     PHQ-9 score:    Hx of Abuse:       Delivery Date: 18.    Delivering provider:  Kaylynn Moser CNM.    Type of delivery:  .  Perineum:  tear, with repair.     Delivery complications: None  Infant gender:  boy, weight 7 pounds 6.2 oz.  Feeding Method:   and bottlefed  Complications reported with feeding:  none, infant thriving .    Bleeding:  None.  Duration:  2 weeks.  Menses resumed:  No  Bowel/Urinary problems:  No    Contraception Planned:  Discuss options  She  has not had intercourse since delivery..       ================================================================  Enid is a  female who presents for her 6 week postpartum visit.   - States she is doing well. Initially difficulties with breast feeding including a clogged duct, difficulty with latch, and decreased milk production. She did see a lactation consultant once for this problem. Because her , Xavier, was losing a significant amount of weight they recommended that she start bottle feeding and supplementing with formula. Presently she is doing mostly bottle feeding with about half pumped breast milk and half formula. She feels okay about this and is happy with it. She is pumped at bedtime, once overnight, and at least 2 times in the day. On phenigreek and planning to try brewers yeast.    -  Mood is great.   - Bleeding stopped after two weeks of spotting.   - Bladder and bowel function have returned to normal.   - They have not returned to intercourse yet and she is nervous about it, as she had  "burning with intercourse for her entire pregnancy. Infectious work up was negative prenatally.   - She has used the Mirena IUD for contraception in the past and loved it. Considering this or Nexplanon insertion in two weeks time.   - She stopped taking PNV and iron d/t nausea. Otherwise feeling well.    ROS: 10 point ROS neg other than the symptoms noted above in the HPI.     Pap NIL, HPV negative 3/2015  Pap Unsatisfactory sample 10/2014  Pap NIL 2013  Pap unsatisfactory sample 2012  Pap NIL 2012  Pap LSIL 2011, Colpo RACHEL 1  Pap NIL 2010    EXAM:  /73  Pulse 61  Ht 1.6 m (5' 3\")  Wt 58.9 kg (129 lb 12.8 oz)  LMP 2017  BMI 22.99 kg/m2    General: healthy, alert and no distress  Psych: negative for sleep disturbance, nervous breakdown, depression, thoughts of self-harm and thoughts of hurting someone else  PHQ9: 0; GAD7: 0  Breasts:  Lactating, Nipples intact with no lesions, Non-tender, No S/S of yeast or mastitis and no palpable masses.  Abdomen: Benign, Soft, flat, non-tender, No masses, organomegaly and Diastasis less than 1-2 FB  Incision:   None   Vulva:  Normal genitalia and Bartholin's, Urethra, [place]'s normal  Vagina:  normal with fair muscle tone, without discharge, rugated and epis/repair well healed  Cervix:  Multiparous, firm, closed, without palpable mass or lesion. No cervical motion tenderness.    Uterus:  fully involuted and non-tender    Adnexa:  Within normal limits and No masses, nodularity, tenderness  Recto-vaginal:   anus normal      ASSESSMENT:   Encounter Diagnoses   Name Primary?     Routine postpartum follow-up Yes     Postpartum care and examination of lactating mother      General counseling and advice for contraceptive management      History of abnormal cervical Pap smear      Anxiety       Normal postpartum exam after   Pregnancy was complicated by:  none.      PLAN:   Orders Placed This Encounter   Medications     Nutritional Supplements (NUTRITIONAL " SUPPLEMENT PO)     Sig: Go lacta powder        Risks and benefits of prescribed medications discussed.  Medication instructions reviewed.  Contraception methods discussed: Mirena vs nexplanon  Signs and symptoms of postpartum depression/anxiety discussed and resources offered  Discussed calcium intake, vitamins and supplements including Vitamin D  Encouraged restarted supplements if she can tolerate it.  Exercise encouraged  Kegel exercises recommended  Reviewed return to intercourse. Vaginal tissue pink and moist, recommended vaginal moisturizers, lubrication with intercourse, orgasm before penetration, and slow approach. Welcomed to return to clinic if she has further issues.  Reviewed abstinence or condom use until definitive contraception method in place.  Pap with HPV cotesting due 2020 per [facility]     Follow up in two weeks for contraception or PRN for questions and concerns.    I, Swapna Rebolledo, am serving as a scribe; to document services personally performed by  NIRAV Koch CNM based on data collection and the provider's statements to me.     Swapna Rebolledo, PA-S2    The encounter was performed by me and scribed by the PA-S2. The scribed note accurately reflects my personal services and decisions made by me.  Kaylynn GASTELUM CNM      Again, thank you for allowing me to participate in the care of your patient.      Sincerely,    NIRAV Acosta CNM

## 2018-08-18 PROBLEM — B95.1 POSITIVE GBS TEST: Status: RESOLVED | Noted: 2018-06-03 | Resolved: 2018-08-18

## 2018-08-18 PROBLEM — Z34.83 ENCOUNTER FOR SUPERVISION OF OTHER NORMAL PREGNANCY, THIRD TRIMESTER: Status: RESOLVED | Noted: 2018-05-03 | Resolved: 2018-08-18

## 2018-09-05 ENCOUNTER — TELEPHONE (OUTPATIENT)
Dept: OBGYN | Facility: CLINIC | Age: 33
End: 2018-09-05

## 2018-09-05 DIAGNOSIS — B37.89 CANDIDIASIS OF BREAST: Primary | ICD-10-CM

## 2018-09-05 RX ORDER — FLUCONAZOLE 100 MG/1
100 TABLET ORAL 2 TIMES DAILY
Qty: 30 TABLET | Refills: 0 | Status: SHIPPED | OUTPATIENT
Start: 2018-09-05 | End: 2019-07-26

## 2018-09-05 NOTE — TELEPHONE ENCOUNTER
Received call from Enid stating her infant 'has had thrush forever' and she now has stabbing and stinging pain behind the nipple of her left breast and is wondering if there is a medicine for her. She reports her infant is on oral nystatin.    Advised she can take diflucan (per protocol). Take until pain free for one week. She indicated understanding and agreed with plan.

## 2019-01-28 ENCOUNTER — OFFICE VISIT (OUTPATIENT)
Dept: OBGYN | Facility: CLINIC | Age: 34
End: 2019-01-28
Attending: ADVANCED PRACTICE MIDWIFE
Payer: COMMERCIAL

## 2019-01-28 VITALS
DIASTOLIC BLOOD PRESSURE: 82 MMHG | BODY MASS INDEX: 23.32 KG/M2 | SYSTOLIC BLOOD PRESSURE: 122 MMHG | WEIGHT: 131.6 LBS | HEIGHT: 63 IN | HEART RATE: 72 BPM

## 2019-01-28 DIAGNOSIS — Z87.42 HISTORY OF ABNORMAL CERVICAL PAP SMEAR: ICD-10-CM

## 2019-01-28 DIAGNOSIS — Z30.015 ENCOUNTER FOR INITIAL PRESCRIPTION OF VAGINAL RING HORMONAL CONTRACEPTIVE: ICD-10-CM

## 2019-01-28 DIAGNOSIS — Z30.09 GENERAL COUNSELING AND ADVICE FOR CONTRACEPTIVE MANAGEMENT: Primary | ICD-10-CM

## 2019-01-28 PROCEDURE — G0463 HOSPITAL OUTPT CLINIC VISIT: HCPCS | Mod: 25,ZF

## 2019-01-28 PROCEDURE — 58300 INSERT INTRAUTERINE DEVICE: CPT | Mod: ZF | Performed by: ADVANCED PRACTICE MIDWIFE

## 2019-01-28 PROCEDURE — G0463 HOSPITAL OUTPT CLINIC VISIT: HCPCS

## 2019-01-28 RX ORDER — ETONOGESTREL AND ETHINYL ESTRADIOL VAGINAL RING .015; .12 MG/D; MG/D
1 RING VAGINAL
Qty: 3 EACH | Refills: 1 | Status: SHIPPED | OUTPATIENT
Start: 2019-01-28 | End: 2019-07-26

## 2019-01-28 ASSESSMENT — PAIN SCALES - GENERAL: PAINLEVEL: NO PAIN (0)

## 2019-01-28 ASSESSMENT — MIFFLIN-ST. JEOR: SCORE: 1282.01

## 2019-01-28 NOTE — PROGRESS NOTES
"Chief Complaint:  Contraception Counseling  Subjective:  Patient is a 33 year old  who presents to discuss birth control options.   Current Birth Control:  none. Has tried REAL in the past and had success with some of the dosing but had a lot of nausea and mood changes with other doses which she believes were higher. Took DMPA and doesn't want to try again.  She had a Mirena previously and \"loved it\" - great bleeding profile and very limited SE, she kept it the full 5 years.  She has been struggling with pelvic hyper tone - seeing Urogynocology and is getting some relief their recommendations.  Is worried about having something else inserted and maybe causing flare of symptoms.  Considered the Nexplanon but is worried about the possible breakthough bleeding.    She doesn't like the idea of taking a pill every day either.   No history of migraine, non smoker, no personal or family h/o DVT.    Patient's last menstrual period was 2019.    Has a remote history of abnormal pap noted in care everywhere and is interested in Pap smear.   Pap NIL, no HPV done   Pap NIL, HPV negative 3/2015  Pap Unsatisfactory sample 10/2014  Pap NIL 2013  Pap unsatisfactory sample 2012  Pap NIL 2012  Pap LSIL 2011, Colpo RACHEL 1  Pap NIL 2010  Pap NIL       Objective:  Vitals:    19 1554   BP: 122/82   Pulse: 72   Weight: 59.7 kg (131 lb 9.6 oz)   Height: 1.61 m (5' 3.38\")     Additional Physical Exam Deferred    Assessment/Plan:  - Contraception Management  We discussed following options:   Birth control pills: that they prevent pregnancy by inhibiting ovulation, have a very good long term cycle control, and are only effective when they are taken reliably on a daily basis. We discussed increased failure rate with skipped pills as well as breakthrough bleeding.     Nuvaring: that prevents pregnancy by inhibiting ovulation, have very good long term cycle control, and are only effective when used as directed. " We discussed increased failure rate with ring out out over 3 hours or left in place over 4 weeks.   Reviewed may have less hormonal side effects due to absorption vs pill.  Reviewed may have increase in normal vaginal discharge. Reviewed must have access to safe refrigerator for storage of rings.     IUD's: Mirena We discussed the amenorrhea rate of 70 % with the Mirena after 1 year. We discussed methods is reversible immediately once removed.     Reviewed CDC safety category is appropriate for REAL, Nuvaring, MIrena   - Reviewed risk profile, benefits. Reviewed quick start with required back up barrier method x 7 days, recommended 30 days back up, encouraged condom use with all sexual acitivity for STI risk reduction  - Pt prefers to attempt Nuvaring for birth control.  Reviewed continuous cycling and storage of extra rings.   - Reviewed how/why to contact provider if abdominal pain, chest pain, headaches, eye problems, swelling, or aching in legs/thighs.  - RTO in 3 months for follow up and prn if questions or concerns.    100% of the 35 minute visit was spent in face to face counseling as stated above. All patient's questions were discussed and answered. Pt agreed to plan of care.    Kaylynn GASTELUM CNM

## 2019-01-28 NOTE — PATIENT INSTRUCTIONS
IUD information:     Benefits: The IUD can be 97-99% effective when carefully following directions regarding use. It can be more effective if used with additional contraception. IUD containing progestin may decrease menstrual flow and menstrual cramping.     Risks/Side Effects: include but are not limited to spotting, bleeding, hemorrhage, or anemia: cramping or pain: partial or complete expulsion of device; lost IUD strings; uterine or cervical perforation; embedding of IUD in the uterine wall; increased risk of pelvic inflammatory disease. Women who become pregnant with an IUD in place are at a higher risk for ectopic pregnancy should a pregnancy occur with an IUD in situ. There is a higher rate of miscarriage when pregnancy occurs with IUD in place.    Warning signs: Please call clinic if you have abnormal spotting or heavy bleeding, abdominal pain, dyspareunia, fever, chills, flu like symptoms, or unable to locate strings of IUD, or strings are longer or shorter than expected.    You are encouraged to use condoms for prevention of STD. You may also need back up contraception for 7 days with your IUD. You may use pain medications (ibuprofen) as needed for mild to moderate pain. Please follow-up in clinic in 4-6 weeks for IUD string check if unable to find strings or as directed by provider.

## 2019-01-28 NOTE — LETTER
"2019     RE: Enid Mcbride  [address] [place] [address]     Dear Colleague,    Thank you for referring your patient, Enid Mcbride, to the [facility] at [facility]. Please see a copy of my visit note below.    Chief Complaint:  Contraception Counseling  Subjective:  Patient is a 33 year old  who presents to discuss birth control options.   Current Birth Control:  none. Has tried REAL in the past and had success with some of the dosing but had a lot of nausea and mood changes with other doses which she believes were higher. Took DMPA and doesn't want to try again.  She had a Mirena previously and \"loved it\" - great bleeding profile and very limited SE, she kept it the full 5 years.  She has been struggling with pelvic hyper tone - seeing Urogynocology and is getting some relief their recommendations.  Is worried about having something else inserted and maybe causing flare of symptoms.  Considered the Nexplanon but is worried about the possible breakthough bleeding.    She doesn't like the idea of taking a pill every day either.   No history of migraine, non smoker, no personal or family h/o DVT.    Patient's last menstrual period was 2019.    Has a remote history of abnormal pap noted in care everywhere and is interested in Pap smear.   Pap NIL, no HPV done   Pap NIL, HPV negative 3/2015  Pap Unsatisfactory sample 10/2014  Pap NIL 2013  Pap unsatisfactory sample 2012  Pap NIL 2012  Pap LSIL 2011, Colpo RACHEL 1  Pap NIL 2010  Pap NIL       Objective:  Vitals:    19 1554   BP: 122/82   Pulse: 72   Weight: 59.7 kg (131 lb 9.6 oz)   Height: 1.61 m (5' 3.38\")     Additional Physical Exam Deferred    Assessment/Plan:  - Contraception Management  We discussed following options:   Birth control pills: that they prevent pregnancy by inhibiting ovulation, have a very good long term cycle control, and are only " effective when they are taken reliably on a daily basis. We discussed increased failure rate with skipped pills as well as breakthrough bleeding.     Nuvaring: that prevents pregnancy by inhibiting ovulation, have very good long term cycle control, and are only effective when used as directed. We discussed increased failure rate with ring out out over 3 hours or left in place over 4 weeks.   Reviewed may have less hormonal side effects due to absorption vs pill.  Reviewed may have increase in normal vaginal discharge. Reviewed must have access to safe refrigerator for storage of rings.     IUD's: Mirena We discussed the amenorrhea rate of 70 % with the Mirena after 1 year. We discussed methods is reversible immediately once removed.     Reviewed CDC safety category is appropriate for REAL, Nuvaring, MIrena   - Reviewed risk profile, benefits. Reviewed quick start with required back up barrier method x 7 days, recommended 30 days back up, encouraged condom use with all sexual acitivity for STI risk reduction  - Pt prefers to attempt Nuvaring for birth control.  Reviewed continuous cycling and storage of extra rings.   - Reviewed how/why to contact provider if abdominal pain, chest pain, headaches, eye problems, swelling, or aching in legs/thighs.  - RTO in 3 months for follow up and prn if questions or concerns.    100% of the 35 minute visit was spent in face to face counseling as stated above. All patient's questions were discussed and answered. Pt agreed to plan of care.    Kaylynn GASTELUM CNM

## 2019-04-03 ENCOUNTER — TELEPHONE (OUTPATIENT)
Dept: OBGYN | Facility: CLINIC | Age: 34
End: 2019-04-03

## 2019-04-03 NOTE — TELEPHONE ENCOUNTER
Received call from Enid stating she began the nuvaring at the beginning of March and was taking continuously to be skipping periods.  The first 3 weeks went fine, but when she put the second ring in the began spotting that lasted for about 8 days and had slight cramping pain.  Last night the cramping was a little worse and she passed a dollar coin size clot that looked like there was pale tissue in it. She got worried and took the nuvaring out and she is wondering if she needs to be seen.    Advised that irregular bleeding after starting a hormone and passing some clots after having laid down or been recumbant for a period of time is common - your body is trying to adjust to what we are asking it to do. Can also take a home pregnancy test to be sure it is negative.    She agreed with plan and will wait a week before inserting next nuvaring and will no longer try to use continuously but will allow her body to bleed every month. Nurse agreed with plan.

## 2019-06-21 ENCOUNTER — OFFICE VISIT (OUTPATIENT)
Dept: OBGYN | Facility: CLINIC | Age: 34
End: 2019-06-21
Attending: ADVANCED PRACTICE MIDWIFE
Payer: COMMERCIAL

## 2019-06-21 VITALS
DIASTOLIC BLOOD PRESSURE: 77 MMHG | BODY MASS INDEX: 22.8 KG/M2 | HEART RATE: 73 BPM | HEIGHT: 63 IN | WEIGHT: 128.7 LBS | SYSTOLIC BLOOD PRESSURE: 109 MMHG

## 2019-06-21 DIAGNOSIS — R10.2 PELVIC PAIN IN FEMALE: Primary | ICD-10-CM

## 2019-06-21 PROCEDURE — G0463 HOSPITAL OUTPT CLINIC VISIT: HCPCS | Mod: ZF

## 2019-06-21 ASSESSMENT — MIFFLIN-ST. JEOR: SCORE: 1257.91

## 2019-06-21 NOTE — LETTER
"2019       RE: Enid Mcbride  [address]  [place] [address]     Dear Colleague,    Thank you for referring your patient, Enid Mcbride, to the [facility] at [facility]. Please see a copy of my visit note below.    Enid is a 34 y.o. Female  who presents for worsening pelvic pain since last delivery .     S:  Enid's last child was born  FT via vaginal delivery. She reports that the epidural did not fully work and that it was more painful than prior delivery. During the course of her pregnancy she began having pain after intercourse that she describes as cramping/burning sensation. This continued after pregnancy and  she began having more general pelvic pain. She was seen by urogyn at [facility]  and was diagnosed as having \"tense pelvic muscles.\" She was told to use a heating pad at night that she describes as the only way she has been able to sleep for months. She is using a large heating pad at night that she wraps around anterior to posterior groin. She was also given a muscle relaxant that she has not tried and occassionally using ibuprofen with some success but does not want to be dependent on medications for pain. She had a TV US done 3/19 that came back normal per patient report. She previously discussed starting pelvic floor PT with urogyn but was unable to see them for a follow up and is open to PT.     The pain worsens throughout each day and is resolved in the morning. Sometimes will wrap around from anterior pelvic to perineum and is always tender to palpation. She has some gas/bloating associated with pain. No fevers or other systemic symptoms.     She has not heard of pelvic floor pain being associated with anterior pain and is worried that it is something more serious than muscle pain like cancer.     Diet: balanced, cooks at home  Exercise: several days each wk with     Gyn " "History:  - both full term vaginal deliveries   -menstrual cycle is 25 days with 4 days of moderate bleeding, preceded by mood changes    PAP: remote history of abnormal paps   Pap NIL, no HPV done   Pap NIL, HPV negative 3/2015  Pap Unsatisfactory sample 10/2014  Pap NIL 2013  Pap unsatisfactory sample 2012  Pap NIL 2012  Pap LSIL 2011, Colpo RACHEL 1  Pap NIL 2010  Pap NIL     Sexual History:  -not currently on contraceptives and sexually active with    -describes pain/burning after penetrative sex   -previously on Nuvaring -3/19 but had bad experience with cramping and spotting so discharge    O:  /77 (BP Location: Left arm, Patient Position: Chair)   Pulse 73   Ht 1.6 m (5' 3\")   Wt 58.4 kg (128 lb 11.2 oz)   BMI 22.80 kg/m     General: alert, well-appearing, well-hydrated woman  Pysch: oriented 3x, anxious   Lymph: no inguinal LA  Abdomen: tender to palpation throughout bilateral lower abdomen   Vulva: normal, no lesions or discharge  Urethra: no tenderness or masses  Vagina: no tenderness  Uterus: Small, anteverted, cervix closed-no CMT.    Adnexa: no masses palpated    A/P:  Enid is a 34 y.o. Female  who presents for worsening pelvic pain since last delivery . We discussed pelvic floor anatomy and how pain that wraps around and is relieved with heat and ibuprofen is consistent with muscle pain. She seemed comforted to learn more about pelvic anatomy but said if she was convinced it was just that she wouldn't continue to seek care. Given the length of her discomfort a repeat US is reasonable but lack of systemic symptoms and tenderness pelvic floor pain seems most likely dx at this time.     -repeat pelvic US   -f/u urogyn here for further workup and discuss pelvic floor PT     I, Leila Hays, am serving as a scribe; to document services personally performed by  NIRAV Napoles CNM based on data collection and the provider's statements to " me.     The encounter was performed by me and scribed by the SNM. The scribed note accurately reflects my personal services and decisions made by me.     NIRAV NapolesM

## 2019-06-21 NOTE — PROGRESS NOTES
"Enid is a 34 y.o. Female  who presents for worsening pelvic pain since last delivery .     S:  Enid's last child was born  FT via vaginal delivery. She reports that the epidural did not fully work and that it was more painful than prior delivery. During the course of her pregnancy she began having pain after intercourse that she describes as cramping/burning sensation. This continued after pregnancy and  she began having more general pelvic pain. She was seen by urogyn at [facility]  and was diagnosed as having \"tense pelvic muscles.\" She was told to use a heating pad at night that she describes as the only way she has been able to sleep for months. She is using a large heating pad at night that she wraps around anterior to posterior groin. She was also given a muscle relaxant that she has not tried and occassionally using ibuprofen with some success but does not want to be dependent on medications for pain. She had a TV US done 3/19 that came back normal per patient report. She previously discussed starting pelvic floor PT with urogyn but was unable to see them for a follow up and is open to PT.     The pain worsens throughout each day and is resolved in the morning. Sometimes will wrap around from anterior pelvic to perineum and is always tender to palpation. She has some gas/bloating associated with pain. No fevers or other systemic symptoms.     She has not heard of pelvic floor pain being associated with anterior pain and is worried that it is something more serious than muscle pain like cancer.     Diet: balanced, cooks at home  Exercise: several days each wk with     Gyn History:  - both full term vaginal deliveries   -menstrual cycle is 25 days with 4 days of moderate bleeding, preceded by mood changes    PAP: remote history of abnormal paps   Pap NIL, no HPV done   Pap NIL, HPV negative 3/2015  Pap Unsatisfactory sample 10/2014  Pap NIL 2013  Pap " "unsatisfactory sample 2012  Pap NIL 2012  Pap LSIL 2011, Colpo RACHEL 1  Pap NIL 2010  Pap NIL     Sexual History:  -not currently on contraceptives and sexually active with    -describes pain/burning after penetrative sex   -previously on Nuvaring -3/19 but had bad experience with cramping and spotting so discharge    O:  /77 (BP Location: Left arm, Patient Position: Chair)   Pulse 73   Ht 1.6 m (5' 3\")   Wt 58.4 kg (128 lb 11.2 oz)   BMI 22.80 kg/m    General: alert, well-appearing, well-hydrated woman  Pysch: oriented 3x, anxious   Lymph: no inguinal LA  Abdomen: tender to palpation throughout bilateral lower abdomen   Vulva: normal, no lesions or discharge  Urethra: no tenderness or masses  Vagina: no tenderness  Uterus: Small, anteverted, cervix closed-no CMT.    Adnexa: no masses palpated    A/P:  Enid is a 34 y.o. Female  who presents for worsening pelvic pain since last delivery . We discussed pelvic floor anatomy and how pain that wraps around and is relieved with heat and ibuprofen is consistent with muscle pain. She seemed comforted to learn more about pelvic anatomy but said if she was convinced it was just that she wouldn't continue to seek care. Given the length of her discomfort a repeat US is reasonable but lack of systemic symptoms and tenderness pelvic floor pain seems most likely dx at this time.     -repeat pelvic US   -f/u urogyn here for further workup and discuss pelvic floor PT     OSWALD, Leila Hays, am serving as a scribe; to document services personally performed by  NIRAV Napoles CNM based on data collection and the provider's statements to me.     The encounter was performed by me and scribed by the SN. The scribed note accurately reflects my personal services and decisions made by me. NIRAV Napoles CNM        "

## 2019-06-27 ENCOUNTER — TELEPHONE (OUTPATIENT)
Dept: OBGYN | Facility: CLINIC | Age: 34
End: 2019-06-27

## 2019-06-27 NOTE — TELEPHONE ENCOUNTER
Per Earline Velasco, defer pelvic ultrasound and visit with Dr. Amador until the postpartum period. Patient has OB intake visits scheduled.    Called to patient and left voicemail with this information at [phone].

## 2019-06-27 NOTE — TELEPHONE ENCOUNTER
Enid is calling today to report a positive home pregnancy test last night, LMP 5/29/19.     Patient states she had visit with Earline Velasco last week and discussed some pelvic floor problems and scheduled pelvic ultrasound and visit with Dr. Amador. Patient is asking if she should cancel these visits in the setting of pregnancy.    Discussed with patient that likely we will cancel visits for pelvic floor with Dr. Amador, but patient then states that she would like to keep ultrasound. I discussed with patient that we typically do new OB intake with ultrasound around 8 weeks.     Patient requests that RN run question of need to keep ultrasound/visit with Dr. Amador by [facility]. Routing to [place].

## 2019-07-02 LAB
ABO + RH BLD: NORMAL
ABO + RH BLD: NORMAL
BLD GP AB SCN SERPL QL: NORMAL
CULTURE MICRO: NORMAL
HBV SURFACE AG SERPL QL IA: NON REACTIVE
HEMOGLOBIN: 13.3 G/DL (ref 11.7–15.7)
HIV 1+2 AB+HIV1 P24 AG SERPL QL IA: NON REACTIVE
PLATELET # BLD AUTO: 225 10^9/L
RUBELLA ABY IGG: 12.4
RUBELLA ANTIBODY IGG QUANTITATIVE: NORMAL IU/ML

## 2019-07-09 ENCOUNTER — TELEPHONE (OUTPATIENT)
Dept: OBGYN | Facility: CLINIC | Age: 34
End: 2019-07-09

## 2019-07-09 NOTE — TELEPHONE ENCOUNTER
"Patient called to report she had a new pregnancy visit at [facility] but plans to continue her care with us as she has in prior pregnancies. Pt states midwife at [facility] recommended early ultrasound after hCG result of 3599 at 4 weeks 5 days gestation. Pt is concerned this could mean \"a tumor or multiple pregnancy\". Nurse advised will discuss with midwife on call to determine if she would recommend early US.    Spoke with Kaylynn Moser who advised this hCG not at a concerning level and without symptoms such as bleeding or cramping no indication for early US.    Called patient back but reached voicemail, left message to call triage to discuss  "
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
## 2019-07-26 ENCOUNTER — ANCILLARY PROCEDURE (OUTPATIENT)
Dept: ULTRASOUND IMAGING | Facility: CLINIC | Age: 34
End: 2019-07-26
Attending: ADVANCED PRACTICE MIDWIFE
Payer: COMMERCIAL

## 2019-07-26 ENCOUNTER — TRANSCRIBE ORDERS (OUTPATIENT)
Dept: MATERNAL FETAL MEDICINE | Facility: CLINIC | Age: 34
End: 2019-07-26

## 2019-07-26 ENCOUNTER — OFFICE VISIT (OUTPATIENT)
Dept: OBGYN | Facility: CLINIC | Age: 34
End: 2019-07-26
Attending: ADVANCED PRACTICE MIDWIFE
Payer: COMMERCIAL

## 2019-07-26 VITALS
WEIGHT: 130 LBS | BODY MASS INDEX: 23.04 KG/M2 | DIASTOLIC BLOOD PRESSURE: 74 MMHG | HEIGHT: 63 IN | SYSTOLIC BLOOD PRESSURE: 112 MMHG | HEART RATE: 81 BPM

## 2019-07-26 DIAGNOSIS — Z34.80 SUPERVISION OF OTHER NORMAL PREGNANCY: ICD-10-CM

## 2019-07-26 DIAGNOSIS — Z34.91 ENCOUNTER FOR SUPERVISION OF NORMAL PREGNANCY IN FIRST TRIMESTER, UNSPECIFIED GRAVIDITY: ICD-10-CM

## 2019-07-26 DIAGNOSIS — O26.90 PREGNANCY RELATED CONDITION, ANTEPARTUM: Primary | ICD-10-CM

## 2019-07-26 PROBLEM — O23.40 GBS (GROUP B STREPTOCOCCUS) UTI COMPLICATING PREGNANCY: Status: ACTIVE | Noted: 2019-07-05

## 2019-07-26 PROBLEM — B95.1 GBS (GROUP B STREPTOCOCCUS) UTI COMPLICATING PREGNANCY: Status: ACTIVE | Noted: 2019-07-05

## 2019-07-26 LAB
ABO + RH BLD: NORMAL
ABO + RH BLD: NORMAL
BLD GP AB SCN SERPL QL: NORMAL
BLOOD BANK CMNT PATIENT-IMP: NORMAL
DEPRECATED CALCIDIOL+CALCIFEROL SERPL-MC: 28 UG/L (ref 20–75)
HCV AB SERPL QL IA: NONREACTIVE
SPECIMEN EXP DATE BLD: NORMAL

## 2019-07-26 PROCEDURE — 86901 BLOOD TYPING SEROLOGIC RH(D): CPT | Performed by: ADVANCED PRACTICE MIDWIFE

## 2019-07-26 PROCEDURE — 82306 VITAMIN D 25 HYDROXY: CPT | Performed by: ADVANCED PRACTICE MIDWIFE

## 2019-07-26 PROCEDURE — 86803 HEPATITIS C AB TEST: CPT | Performed by: ADVANCED PRACTICE MIDWIFE

## 2019-07-26 PROCEDURE — 86850 RBC ANTIBODY SCREEN: CPT | Performed by: ADVANCED PRACTICE MIDWIFE

## 2019-07-26 PROCEDURE — 86900 BLOOD TYPING SEROLOGIC ABO: CPT | Performed by: ADVANCED PRACTICE MIDWIFE

## 2019-07-26 PROCEDURE — 76801 OB US < 14 WKS SINGLE FETUS: CPT

## 2019-07-26 PROCEDURE — 87086 URINE CULTURE/COLONY COUNT: CPT | Performed by: ADVANCED PRACTICE MIDWIFE

## 2019-07-26 PROCEDURE — G0463 HOSPITAL OUTPT CLINIC VISIT: HCPCS | Mod: 25,ZF

## 2019-07-26 PROCEDURE — 36415 COLL VENOUS BLD VENIPUNCTURE: CPT | Performed by: ADVANCED PRACTICE MIDWIFE

## 2019-07-26 RX ORDER — VITAMIN A ACETATE, BETA CAROTENE, ASCORBIC ACID, CHOLECALCIFEROL, .ALPHA.-TOCOPHEROL ACETATE, DL-, THIAMINE MONONITRATE, RIBOFLAVIN, NIACINAMIDE, PYRIDOXINE HYDROCHLORIDE, FOLIC ACID, CYANOCOBALAMIN, CALCIUM CARBONATE, FERROUS FUMARATE, ZINC OXIDE, CUPRIC OXIDE 3080; 12; 120; 400; 1; 1.84; 3; 20; 22; 920; 25; 200; 27; 10; 2 [IU]/1; UG/1; MG/1; [IU]/1; MG/1; MG/1; MG/1; MG/1; MG/1; [IU]/1; MG/1; MG/1; MG/1; MG/1; MG/1
1 TABLET, FILM COATED ORAL DAILY
COMMUNITY
End: 2020-10-30

## 2019-07-26 ASSESSMENT — ANXIETY QUESTIONNAIRES
3. WORRYING TOO MUCH ABOUT DIFFERENT THINGS: NOT AT ALL
7. FEELING AFRAID AS IF SOMETHING AWFUL MIGHT HAPPEN: NOT AT ALL
1. FEELING NERVOUS, ANXIOUS, OR ON EDGE: NOT AT ALL
IF YOU CHECKED OFF ANY PROBLEMS ON THIS QUESTIONNAIRE, HOW DIFFICULT HAVE THESE PROBLEMS MADE IT FOR YOU TO DO YOUR WORK, TAKE CARE OF THINGS AT HOME, OR GET ALONG WITH OTHER PEOPLE: NOT DIFFICULT AT ALL
2. NOT BEING ABLE TO STOP OR CONTROL WORRYING: NOT AT ALL
5. BEING SO RESTLESS THAT IT IS HARD TO SIT STILL: NOT AT ALL
GAD7 TOTAL SCORE: 0
6. BECOMING EASILY ANNOYED OR IRRITABLE: NOT AT ALL

## 2019-07-26 ASSESSMENT — PAIN SCALES - GENERAL: PAINLEVEL: NO PAIN (0)

## 2019-07-26 ASSESSMENT — PATIENT HEALTH QUESTIONNAIRE - PHQ9
5. POOR APPETITE OR OVEREATING: NOT AT ALL
SUM OF ALL RESPONSES TO PHQ QUESTIONS 1-9: 0

## 2019-07-26 ASSESSMENT — MIFFLIN-ST. JEOR: SCORE: 1263.68
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
## 2019-07-27 LAB
BACTERIA SPEC CULT: NO GROWTH
Lab: NORMAL
SPECIMEN SOURCE: NORMAL

## 2019-07-27 ASSESSMENT — ANXIETY QUESTIONNAIRES: GAD7 TOTAL SCORE: 0

## 2019-08-09 ENCOUNTER — OFFICE VISIT (OUTPATIENT)
Dept: OBGYN | Facility: CLINIC | Age: 34
End: 2019-08-09
Attending: ADVANCED PRACTICE MIDWIFE
Payer: COMMERCIAL

## 2019-08-09 VITALS
BODY MASS INDEX: 23.74 KG/M2 | HEIGHT: 62 IN | HEART RATE: 78 BPM | WEIGHT: 129 LBS | SYSTOLIC BLOOD PRESSURE: 108 MMHG | DIASTOLIC BLOOD PRESSURE: 73 MMHG

## 2019-08-09 DIAGNOSIS — Z34.80 SUPERVISION OF OTHER NORMAL PREGNANCY: Primary | ICD-10-CM

## 2019-08-09 PROCEDURE — 87624 HPV HI-RISK TYP POOLED RSLT: CPT | Performed by: ADVANCED PRACTICE MIDWIFE

## 2019-08-09 PROCEDURE — 87591 N.GONORRHOEAE DNA AMP PROB: CPT | Performed by: ADVANCED PRACTICE MIDWIFE

## 2019-08-09 PROCEDURE — G0463 HOSPITAL OUTPT CLINIC VISIT: HCPCS | Mod: ZF

## 2019-08-09 PROCEDURE — 87491 CHLMYD TRACH DNA AMP PROBE: CPT | Performed by: ADVANCED PRACTICE MIDWIFE

## 2019-08-09 PROCEDURE — G0145 SCR C/V CYTO,THINLAYER,RESCR: HCPCS | Performed by: ADVANCED PRACTICE MIDWIFE

## 2019-08-09 ASSESSMENT — ANXIETY QUESTIONNAIRES
7. FEELING AFRAID AS IF SOMETHING AWFUL MIGHT HAPPEN: NOT AT ALL
3. WORRYING TOO MUCH ABOUT DIFFERENT THINGS: NOT AT ALL
1. FEELING NERVOUS, ANXIOUS, OR ON EDGE: NOT AT ALL
5. BEING SO RESTLESS THAT IT IS HARD TO SIT STILL: NOT AT ALL
GAD7 TOTAL SCORE: 0
6. BECOMING EASILY ANNOYED OR IRRITABLE: NOT AT ALL
2. NOT BEING ABLE TO STOP OR CONTROL WORRYING: NOT AT ALL

## 2019-08-09 ASSESSMENT — MIFFLIN-ST. JEOR: SCORE: 1243.39

## 2019-08-09 ASSESSMENT — PATIENT HEALTH QUESTIONNAIRE - PHQ9: 5. POOR APPETITE OR OVEREATING: NOT AT ALL
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
## 2019-08-10 ASSESSMENT — ANXIETY QUESTIONNAIRES: GAD7 TOTAL SCORE: 0

## 2019-08-11 LAB
C TRACH DNA SPEC QL NAA+PROBE: NEGATIVE
N GONORRHOEA DNA SPEC QL NAA+PROBE: NEGATIVE
SPECIMEN SOURCE: NORMAL
SPECIMEN SOURCE: NORMAL

## 2019-08-14 LAB
COPATH REPORT: NORMAL
PAP: NORMAL

## 2019-08-16 LAB
FINAL DIAGNOSIS: NORMAL
HPV HR 12 DNA CVX QL NAA+PROBE: NEGATIVE
HPV16 DNA SPEC QL NAA+PROBE: NEGATIVE
HPV18 DNA SPEC QL NAA+PROBE: NEGATIVE
SPECIMEN DESCRIPTION: NORMAL
SPECIMEN SOURCE CVX/VAG CYTO: NORMAL

## 2019-08-19 ENCOUNTER — TELEPHONE (OUTPATIENT)
Dept: OBGYN | Facility: CLINIC | Age: 34
End: 2019-08-19

## 2019-08-19 NOTE — TELEPHONE ENCOUNTER
Spoke with patient via TC regarding concerns about spotting in early pregnancy. Reports having intercourse on Thursday, had brown spotting afterwards for next couple days. Reports intercourse again on Sunday, but then had some pinkish discharge today in underwear.  Denies any abnormal discharge, itching, irritation or odor. Denies cramping.  Discussed likelihood of cervical friability exacerbated by intercourse. Encouraged to continue to monitor, if experiencing bright red or heavy bleeding, with or without cramping to present to ED.  Pt verbalized understanding and agreement of plan. -NIRAV De Jesus CNM

## 2019-09-09 ENCOUNTER — OFFICE VISIT (OUTPATIENT)
Dept: OBGYN | Facility: CLINIC | Age: 34
End: 2019-09-09
Attending: ADVANCED PRACTICE MIDWIFE
Payer: COMMERCIAL

## 2019-09-09 VITALS
DIASTOLIC BLOOD PRESSURE: 72 MMHG | HEART RATE: 89 BPM | BODY MASS INDEX: 24.47 KG/M2 | SYSTOLIC BLOOD PRESSURE: 122 MMHG | WEIGHT: 133.8 LBS

## 2019-09-09 DIAGNOSIS — Z34.80 SUPERVISION OF OTHER NORMAL PREGNANCY: Primary | ICD-10-CM

## 2019-09-09 PROBLEM — E55.9 VITAMIN D DEFICIENCY: Status: RESOLVED | Noted: 2017-03-31 | Resolved: 2019-09-09

## 2019-09-09 PROCEDURE — G0463 HOSPITAL OUTPT CLINIC VISIT: HCPCS | Mod: ZF

## 2019-09-09 ASSESSMENT — PAIN SCALES - GENERAL: PAINLEVEL: NO PAIN (0)

## 2019-09-09 NOTE — NURSING NOTE
Chief Complaint   Patient presents with     Prenatal Care     PRECIOUS 14 weeks and 4 days   Starla Reid LPN

## 2019-09-09 NOTE — PROGRESS NOTES
Subjective:      34 year old  at 14w4d presents for a routine prenatal appointment.       no vaginal bleeding or leakage of fluid.  no contractions or cramping.    not feeling  fetal movement.       No HA, visual changes, RUQ or epigastric pain.   The patient presents with the following concerns: feeling good   Working    Level II US  Scheduled.   Declined genetic testing    Objective:  /72   Pulse 89   Wt 60.7 kg (133 lb 12.8 oz)   LMP 2019   Breastfeeding? No   BMI 24.47 kg/m     See OB flowsheet    Assessment/Plan  (Z34.80) Supervision of other normal pregnancy  (primary encounter diagnosis)    - Reviewed total weight gain, encouraged continued healthy diet and exercise.      - Reviewed why/how to contact provider.    Patient education/orders or handouts today:  Level 2 u/s scheduled   Return to clinic in 5 weeks and prn if questions or concerns.   NIRAV Griffin CNM

## 2019-09-25 NOTE — PROGRESS NOTES
"Subjective:      34 year old  at 17w0d presents for an add on prenatal visit- scheduled by patient.      Patient concerns: Feeling well overall. Made appointment today because she was feeling \"paranoid\" and wanted to hear the fetal heartbeat before telling more people she is pregnant. Has been feeling fetal movement- \"tickling.\" Reports that sometimes she does not feel movement and she feels well, so she gets nervous and \"does not even feel pregnant.\"   Pt reports +fetal movement this morning in clinic.   Denies cramping/contractions, vaginal bleeding, discharge or leakage of fluid.   No HA, vision changes, ruq/epigastric pain.      Has level 2 ultrasound and next PRECIOUS appt scheduled for 10/8/19. Surprise sex of baby!    Objective:  Vitals:    19 0814   BP: 107/65   Pulse: 94   Weight: 61.2 kg (135 lb)       See OB flowsheet    Assessment/Plan     Encounter Diagnosis   Name Primary?     Supervision of other normal pregnancy Yes     - Reviewed total weight gain, encouraged continued healthy diet and exercise.      - Reviewed why/how to contact provider.      Patient education/orders or handouts today:  PTL signs/symptoms, fetal movement and level 2 u/s scheduled    Discussed normal FHR via doptones today. Reviewed variations of normal fetal movement at this gestational age. Discussed fetal movement counts starting around 27-28 weeks.   Declines quad screen.  Level 2 ultrasound scheduled for 10/8/19.  Next PRECIOUS scheduled for 10/8/19.      Continue scheduled prenatal care, Next PRECIOUS on 10/8/19. RTC prn if questions or concerns.      Nicol Willams, NIRAV, CORINNA   "

## 2019-09-26 ENCOUNTER — TELEPHONE (OUTPATIENT)
Dept: OBGYN | Facility: CLINIC | Age: 34
End: 2019-09-26

## 2019-09-26 ENCOUNTER — OFFICE VISIT (OUTPATIENT)
Dept: OBGYN | Facility: CLINIC | Age: 34
End: 2019-09-26
Attending: ADVANCED PRACTICE MIDWIFE
Payer: COMMERCIAL

## 2019-09-26 VITALS
SYSTOLIC BLOOD PRESSURE: 107 MMHG | HEART RATE: 94 BPM | BODY MASS INDEX: 24.69 KG/M2 | WEIGHT: 135 LBS | DIASTOLIC BLOOD PRESSURE: 65 MMHG

## 2019-09-26 DIAGNOSIS — Z34.80 SUPERVISION OF OTHER NORMAL PREGNANCY: Primary | ICD-10-CM

## 2019-09-26 PROCEDURE — G0463 HOSPITAL OUTPT CLINIC VISIT: HCPCS | Mod: ZF

## 2019-09-26 ASSESSMENT — PAIN SCALES - GENERAL: PAINLEVEL: NO PAIN (0)

## 2019-09-26 NOTE — LETTER
"2019       RE: Enid Mcbride  [address]  [place] [address]     Dear Colleague,    Thank you for referring your patient, Enid Mcbride, to the [facility]. Please see a copy of my visit note below.    Subjective:      34 year old  at 17w0d presents for an add on prenatal visit- scheduled by patient.      Patient concerns: Feeling well overall. Made appointment today because she was feeling \"paranoid\" and wanted to hear the fetal heartbeat before telling more people she is pregnant. Has been feeling fetal movement- \"tickling.\" Reports that sometimes she does not feel movement and she feels well, so she gets nervous and \"does not even feel pregnant.\"   Pt reports +fetal movement this morning in clinic.   Denies cramping/contractions, vaginal bleeding, discharge or leakage of fluid.   No HA, vision changes, ruq/epigastric pain.      Has level 2 ultrasound and next PRECIOUS appt scheduled for 10/8/19. Surprise sex of baby!    Objective:  Vitals:    19 0814   BP: 107/65   Pulse: 94   Weight: 61.2 kg (135 lb)   See OB flowsheet    Assessment/Plan  Encounter Diagnosis   Name Primary?     Supervision of other normal pregnancy Yes     - Reviewed total weight gain, encouraged continued healthy diet and exercise.      - Reviewed why/how to contact provider.      Patient education/orders or handouts today:  PTL signs/symptoms, fetal movement and level 2 u/s scheduled    Discussed normal FHR via doptones today. Reviewed variations of normal fetal movement at this gestational age. Discussed fetal movement counts starting around 27-28 weeks.   Declines quad screen.  Level 2 ultrasound scheduled for 10/8/19.  Next PRECIOUS scheduled for 10/8/19.      Continue scheduled prenatal care, Next PRECIOUS on 10/8/19. RTC prn if questions or concerns.      NIRAV Luis, CORINNA         "

## 2019-09-26 NOTE — NURSING NOTE
Chief Complaint   Patient presents with     Prenatal Care     PRECIOUS 17 weeks 1 day  C/O irregular heart beats   Starla Reid LPN

## 2019-09-26 NOTE — TELEPHONE ENCOUNTER
Enid called to report symptoms of vaginal itching and chunky white discharge. Symptoms are consistent with prior yeast infections. Advised patient in pregnancy we recommend monistat 7 day treatment. If symptoms unresolved should be evaluated in clinic. Patient expressed understanding and agrees with plan.

## 2019-10-02 ENCOUNTER — HEALTH MAINTENANCE LETTER (OUTPATIENT)
Age: 34
End: 2019-10-02

## 2019-10-07 ENCOUNTER — PRE VISIT (OUTPATIENT)
Dept: MATERNAL FETAL MEDICINE | Facility: CLINIC | Age: 34
End: 2019-10-07

## 2019-10-08 ENCOUNTER — HOSPITAL ENCOUNTER (OUTPATIENT)
Dept: ULTRASOUND IMAGING | Facility: CLINIC | Age: 34
Discharge: HOME | End: 2019-10-08
Attending: ADVANCED PRACTICE MIDWIFE | Admitting: ADVANCED PRACTICE MIDWIFE
Payer: COMMERCIAL

## 2019-10-08 ENCOUNTER — OFFICE VISIT (OUTPATIENT)
Dept: OBGYN | Facility: CLINIC | Age: 34
End: 2019-10-08
Attending: ADVANCED PRACTICE MIDWIFE
Payer: COMMERCIAL

## 2019-10-08 ENCOUNTER — OFFICE VISIT (OUTPATIENT)
Dept: MATERNAL FETAL MEDICINE | Facility: CLINIC | Age: 34
End: 2019-10-08
Attending: ADVANCED PRACTICE MIDWIFE
Payer: COMMERCIAL

## 2019-10-08 VITALS
BODY MASS INDEX: 25.32 KG/M2 | HEART RATE: 84 BPM | DIASTOLIC BLOOD PRESSURE: 69 MMHG | SYSTOLIC BLOOD PRESSURE: 100 MMHG | HEIGHT: 62 IN | WEIGHT: 137.6 LBS

## 2019-10-08 DIAGNOSIS — O26.90 PREGNANCY RELATED CONDITION, ANTEPARTUM: ICD-10-CM

## 2019-10-08 DIAGNOSIS — Z82.79 FAMILY HISTORY OF CONGENITAL ANOMALIES: Primary | ICD-10-CM

## 2019-10-08 DIAGNOSIS — Z34.80 SUPERVISION OF OTHER NORMAL PREGNANCY: Primary | ICD-10-CM

## 2019-10-08 PROCEDURE — G0463 HOSPITAL OUTPT CLINIC VISIT: HCPCS | Mod: ZF

## 2019-10-08 PROCEDURE — 76811 OB US DETAILED SNGL FETUS: CPT

## 2019-10-08 ASSESSMENT — MIFFLIN-ST. JEOR: SCORE: 1282.4

## 2019-10-08 NOTE — PROGRESS NOTES
"Please see \"Imaging\" tab under Chart Review for full details.    Earline Reyna MD  Maternal Fetal Medicine    "

## 2019-10-08 NOTE — PROGRESS NOTES
"Subjective:      34 year old  at 18w5d presents for a routine prenatal appointment.    Pt just seen at 17w but has her comprehensive US today.   Denies vaginal bleeding or leakage of fluid.  Denies contractions or cramping.    Endorses fetal movement.       No HA, visual changes, RUQ or epigastric pain.   The patient presents with the following concerns: Treated 3 days for a yeast with PV monistat (dx and rx over phone) but stopped because of burning with application, has been asymptomatic now for past 3-4 days.    Level II US  Results reviewed normal scan.   Offered quad screen - declines      Objective:  Vitals:    10/08/19 0922   BP: 100/69   BP Location: Left arm   Patient Position: Chair   Pulse: 84   Weight: 62.4 kg (137 lb 9.6 oz)   Height: 1.575 m (5' 2\")     See OB flowsheet    US today at [facility]:  1) Caba intrauterine pregnancy at 18 & 5/7 weeks gestational age.  2) None of the anomalies commonly detected by ultrasound were evident in the detailed fetal anatomic survey as described above.  3) Growth parameters and estimated fetal weight were consistent with established dates.  4) The amniotic fluid volume appeared normal.  5) Normal fetal activity for gestational age.  6) On transabdominal imaging the cervix appears long and closed.    Assessment/Plan     Encounter Diagnosis   Name Primary?     Supervision of other normal pregnancy Yes     - Reviewed total weight gain, encouraged continued healthy diet and exercise.      - Reviewed why/how to contact provider.    Patient education/orders or handouts today:  PTL signs/symptoms, Quad screen and Fetal movement   - Make appt for swab next time with vaginal symptoms   Return to clinic in 4 weeks and prn if questions or concerns.     Valerie Alfaro CNM                "

## 2019-10-08 NOTE — LETTER
"10/8/2019       RE: Enid Mcbride  [address]  [place] [address]     Dear Colleague,    Thank you for referring your patient, Enid Mcbride, to the [facility] at [facility]. Please see a copy of my visit note below.    Subjective:     34 year old  at 18w5d presents for a routine prenatal appointment.    Pt just seen at 17w but has her comprehensive US today.  Denies vaginal bleeding or leakage of fluid.  Denies contractions or cramping.   Endorses fetal movement.       No HA, visual changes, RUQ or epigastric pain.   The patient presents with the following concerns: Treated 3 days for a yeast with PV monistat (dx and rx over phone) but stopped because of burning with application, has been asymptomatic now for past 3-4 days.    Level II US  Results reviewed normal scan.   Offered quad screen - declines      Objective:  Vitals:    10/08/19 0922   BP: 100/69   BP Location: Left arm   Patient Position: Chair   Pulse: 84   Weight: 62.4 kg (137 lb 9.6 oz)   Height: 1.575 m (5' 2\")   See OB flowsheet    US today at [facility]:  1) Caba intrauterine pregnancy at 18 & 5/7 weeks gestational age.  2) None of the anomalies commonly detected by ultrasound were evident in the detailed fetal anatomic survey as described above.  3) Growth parameters and estimated fetal weight were consistent with established dates.  4) The amniotic fluid volume appeared normal.  5) Normal fetal activity for gestational age.  6) On transabdominal imaging the cervix appears long and closed.    Assessment/Plan  Encounter Diagnosis   Name Primary?     Supervision of other normal pregnancy Yes     - Reviewed total weight gain, encouraged continued healthy diet and exercise.      - Reviewed why/how to contact provider.    Patient education/orders or handouts today:  PTL signs/symptoms, Quad screen and Fetal movement   - Make appt for swab next time with vaginal symptoms   " Return to clinic in 4 weeks and prn if questions or concerns.     Valerie Alfaro CNM

## 2019-10-31 NOTE — PROGRESS NOTES
"Subjective:      34 year old  at 22w4d presents for a routine prenatal appointment.         Denies cramping/contractions, vaginal bleeding, discharge or leakage of fluid. Reports +fetal movement.  No HA, vision changes, ruq/epigastric pain.       Patient concerns: Feeling well overall. Had level 2 ultrasound on 10/8- WNL, placenta anterior. Not finding out sex of baby but is convinced it is a girl.     Objective:  Vitals:    19 1605   BP: 105/68   Pulse: 68   Weight: 64.6 kg (142 lb 6.4 oz)   Height: 1.575 m (5' 2\")       See OB flowsheet    Assessment/Plan     Encounter Diagnosis   Name Primary?     Supervision of other normal pregnancy Yes     - Reviewed total weight gain, encouraged continued healthy diet and exercise.      - Reviewed why/how to contact provider.      Patient education/orders or handouts today:  PTL signs/symptoms, fetal movement and Plan for EOB visit w labs    Reviewed level 2 ultrasound.   Plan EOB next visit.     Continue scheduled prenatal care, RTC in 4-5 weeks for EOB and prn if questions or concerns.      Nicol Lui, NIRAV, CNM   "

## 2019-11-04 ENCOUNTER — OFFICE VISIT (OUTPATIENT)
Dept: OBGYN | Facility: CLINIC | Age: 34
End: 2019-11-04
Attending: ADVANCED PRACTICE MIDWIFE
Payer: COMMERCIAL

## 2019-11-04 VITALS
HEART RATE: 68 BPM | SYSTOLIC BLOOD PRESSURE: 105 MMHG | HEIGHT: 62 IN | WEIGHT: 142.4 LBS | BODY MASS INDEX: 26.2 KG/M2 | DIASTOLIC BLOOD PRESSURE: 68 MMHG

## 2019-11-04 DIAGNOSIS — Z34.80 SUPERVISION OF OTHER NORMAL PREGNANCY: Primary | ICD-10-CM

## 2019-11-04 PROCEDURE — G0463 HOSPITAL OUTPT CLINIC VISIT: HCPCS | Mod: ZF

## 2019-11-04 ASSESSMENT — MIFFLIN-ST. JEOR: SCORE: 1304.17

## 2019-11-04 ASSESSMENT — PAIN SCALES - GENERAL: PAINLEVEL: NO PAIN (0)

## 2019-11-04 NOTE — LETTER
"2019       RE: Enid Mcbride  [address]  [place] [address]     Dear Colleague,    Thank you for referring your patient, Enid Mcbride, to the [facility] at [facility]. Please see a copy of my visit note below.    Subjective:      34 year old  at 22w4d presents for a routine prenatal appointment.         Denies cramping/contractions, vaginal bleeding, discharge or leakage of fluid. Reports +fetal movement.  No HA, vision changes, ruq/epigastric pain.       Patient concerns: Feeling well overall. Had level 2 ultrasound on 10/8- WNL, placenta anterior. Not finding out sex of baby but is convinced it is a girl.     Objective:  Vitals:    19 1605   BP: 105/68   Pulse: 68   Weight: 64.6 kg (142 lb 6.4 oz)   Height: 1.575 m (5' 2\")   See OB flowsheet    Assessment/Plan  Encounter Diagnosis   Name Primary?     Supervision of other normal pregnancy Yes     - Reviewed total weight gain, encouraged continued healthy diet and exercise.      - Reviewed why/how to contact provider.      Patient education/orders or handouts today:  PTL signs/symptoms, fetal movement and Plan for EOB visit w labs    Reviewed level 2 ultrasound.   Plan EOB next visit.     Continue scheduled prenatal care, RTC in 4-5 weeks for EOB and prn if questions or concerns.      NIRAV Boggs, CNM         "

## 2019-12-06 ENCOUNTER — OFFICE VISIT (OUTPATIENT)
Dept: OBGYN | Facility: CLINIC | Age: 34
End: 2019-12-06
Attending: MIDWIFE
Payer: COMMERCIAL

## 2019-12-06 VITALS
DIASTOLIC BLOOD PRESSURE: 79 MMHG | HEART RATE: 97 BPM | SYSTOLIC BLOOD PRESSURE: 118 MMHG | HEIGHT: 62 IN | BODY MASS INDEX: 26.87 KG/M2 | WEIGHT: 146 LBS

## 2019-12-06 DIAGNOSIS — Z23 NEED FOR VACCINATION: ICD-10-CM

## 2019-12-06 DIAGNOSIS — Z34.80 SUPERVISION OF OTHER NORMAL PREGNANCY: Primary | ICD-10-CM

## 2019-12-06 LAB
ERYTHROCYTE [DISTWIDTH] IN BLOOD BY AUTOMATED COUNT: 12.8 % (ref 10–15)
GLUCOSE 1H P 50 G GLC PO SERPL-MCNC: 78 MG/DL (ref 60–129)
HCT VFR BLD AUTO: 36.4 % (ref 35–47)
HGB BLD-MCNC: 11.9 G/DL (ref 11.7–15.7)
MCH RBC QN AUTO: 30.4 PG (ref 26.5–33)
MCHC RBC AUTO-ENTMCNC: 32.7 G/DL (ref 31.5–36.5)
MCV RBC AUTO: 93 FL (ref 78–100)
PLATELET # BLD AUTO: 187 10E9/L (ref 150–450)
RBC # BLD AUTO: 3.92 10E12/L (ref 3.8–5.2)
T PALLIDUM AB SER QL: NONREACTIVE
WBC # BLD AUTO: 7.5 10E9/L (ref 4–11)

## 2019-12-06 PROCEDURE — 86780 TREPONEMA PALLIDUM: CPT | Performed by: MIDWIFE

## 2019-12-06 PROCEDURE — G0463 HOSPITAL OUTPT CLINIC VISIT: HCPCS | Mod: ZF,25

## 2019-12-06 PROCEDURE — 90715 TDAP VACCINE 7 YRS/> IM: CPT | Mod: ZF

## 2019-12-06 PROCEDURE — 25000128 H RX IP 250 OP 636: Mod: ZF

## 2019-12-06 PROCEDURE — 36415 COLL VENOUS BLD VENIPUNCTURE: CPT | Performed by: MIDWIFE

## 2019-12-06 PROCEDURE — 85027 COMPLETE CBC AUTOMATED: CPT | Performed by: MIDWIFE

## 2019-12-06 PROCEDURE — 90471 IMMUNIZATION ADMIN: CPT | Mod: ZF

## 2019-12-06 PROCEDURE — 82306 VITAMIN D 25 HYDROXY: CPT | Performed by: MIDWIFE

## 2019-12-06 PROCEDURE — 82950 GLUCOSE TEST: CPT | Performed by: MIDWIFE

## 2019-12-06 ASSESSMENT — ANXIETY QUESTIONNAIRES
2. NOT BEING ABLE TO STOP OR CONTROL WORRYING: NOT AT ALL
GAD7 TOTAL SCORE: 0
5. BEING SO RESTLESS THAT IT IS HARD TO SIT STILL: NOT AT ALL
7. FEELING AFRAID AS IF SOMETHING AWFUL MIGHT HAPPEN: NOT AT ALL
1. FEELING NERVOUS, ANXIOUS, OR ON EDGE: NOT AT ALL
IF YOU CHECKED OFF ANY PROBLEMS ON THIS QUESTIONNAIRE, HOW DIFFICULT HAVE THESE PROBLEMS MADE IT FOR YOU TO DO YOUR WORK, TAKE CARE OF THINGS AT HOME, OR GET ALONG WITH OTHER PEOPLE: NOT DIFFICULT AT ALL
6. BECOMING EASILY ANNOYED OR IRRITABLE: NOT AT ALL
3. WORRYING TOO MUCH ABOUT DIFFERENT THINGS: NOT AT ALL

## 2019-12-06 ASSESSMENT — MIFFLIN-ST. JEOR: SCORE: 1320.63

## 2019-12-06 ASSESSMENT — PAIN SCALES - GENERAL: PAINLEVEL: NO PAIN (0)

## 2019-12-06 ASSESSMENT — PATIENT HEALTH QUESTIONNAIRE - PHQ9
SUM OF ALL RESPONSES TO PHQ QUESTIONS 1-9: 0
5. POOR APPETITE OR OVEREATING: NOT AT ALL

## 2019-12-06 NOTE — PROGRESS NOTES
34 year old, , 27w1d,   The patient presents with the following concerns: feeling good here with spouse   PHQ-9 SCORE 4/3/2018 2018 2019   PHQ-9 Total Score 2 1 0     Education completed today includes breast feeding, [facility] hand out , contraception, counting movements, signs of pre-term labor, when to present to birthplace, post partum depression, GBS, getting enough iron and labor induction.  Birth preferences reviewed: Medicated  Labor support:    May be alone if at night   Plans 8 wks off work     Feeding plans :  pumped both first cleft palate, second tongue tied    Contraception planned:  Vasectomy has scheduled    The following labs were ordered today:       GCT, CBC w platelets, Vitamin D and Anti-treponema  Water birth consent form was not given.  Blood type:   ABO   Date Value Ref Range Status   2019 O  Final     RH(D)   Date Value Ref Range Status   2019 Pos  Final     Antibody Screen   Date Value Ref Range Status   2019 Neg  Final   , Rhogam  was notgiven.  TDAP  Was given.declined flu shot   A/P:  Encounter Diagnosis   Name Primary?     Supervision of other normal pregnancy Yes     Orders Placed This Encounter   Procedures     Glucose 1 Hour     CBC with Platelets     Treponema Abs w Reflex to RPR and Titer     25- OH-Vitamin D     Continue scheduled prenatal care  NIRAV Griffin CNM

## 2019-12-07 ASSESSMENT — ANXIETY QUESTIONNAIRES: GAD7 TOTAL SCORE: 0

## 2019-12-09 LAB — DEPRECATED CALCIDIOL+CALCIFEROL SERPL-MC: 27 UG/L (ref 20–75)

## 2020-01-06 ENCOUNTER — OFFICE VISIT (OUTPATIENT)
Dept: OBGYN | Facility: CLINIC | Age: 35
End: 2020-01-06
Attending: ADVANCED PRACTICE MIDWIFE
Payer: COMMERCIAL

## 2020-01-06 VITALS — SYSTOLIC BLOOD PRESSURE: 105 MMHG | BODY MASS INDEX: 27.67 KG/M2 | WEIGHT: 151.3 LBS | DIASTOLIC BLOOD PRESSURE: 58 MMHG

## 2020-01-06 DIAGNOSIS — Z34.80 SUPERVISION OF OTHER NORMAL PREGNANCY: Primary | ICD-10-CM

## 2020-01-06 DIAGNOSIS — N76.0 BV (BACTERIAL VAGINOSIS): ICD-10-CM

## 2020-01-06 DIAGNOSIS — B96.89 BACTERIAL VAGINOSIS IN PREGNANCY: ICD-10-CM

## 2020-01-06 DIAGNOSIS — O23.599 BACTERIAL VAGINOSIS IN PREGNANCY: ICD-10-CM

## 2020-01-06 DIAGNOSIS — B96.89 BV (BACTERIAL VAGINOSIS): ICD-10-CM

## 2020-01-06 LAB
CLUE CELLS: POSITIVE
TRICHOMONAS (WET PREP): NEGATIVE
YEAST (WET PREP): NEGATIVE

## 2020-01-06 PROCEDURE — G0463 HOSPITAL OUTPT CLINIC VISIT: HCPCS | Mod: ZF

## 2020-01-06 PROCEDURE — 87210 SMEAR WET MOUNT SALINE/INK: CPT | Mod: ZF | Performed by: ADVANCED PRACTICE MIDWIFE

## 2020-01-06 RX ORDER — METRONIDAZOLE 500 MG/1
500 TABLET ORAL 2 TIMES DAILY
Qty: 14 TABLET | Refills: 0 | Status: SHIPPED | OUTPATIENT
Start: 2020-01-06 | End: 2020-02-19

## 2020-01-06 ASSESSMENT — PAIN SCALES - GENERAL: PAINLEVEL: NO PAIN (0)

## 2020-01-06 NOTE — LETTER
2020       RE: Enid Mcbride  [address]  [place] [address]     Dear Colleague,    Thank you for referring your patient, Enid Mcbride, to the [facility] at [facility]. Please see a copy of my visit note below.    Subjective:      34 year old  at 31w4d presentst for a routine prenatal appointment.   Denies vaginal bleeding or leakage of fluid.  Denies contractions. Endorses fetal movement.      No HA, visual changes, RUQ or epigastric pain.   Patient concerns: greenish, creamy discharge, no itching associated now (maybe was before). This has been going on for about 3 weeks.  Feeling well overall.  Reviewed EOB labs with patient.  Reviewed TDAP Previously given     Objective:  Vitals:    20 1621   BP: 105/58   Weight: 68.6 kg (151 lb 4.8 oz)   See ob flowsheet    Wet prep + for clue cells, neg yeast, neg trich.    Assessment/Plan  Encounter Diagnoses   Name Primary?     Supervision of other normal pregnancy Yes     BV (bacterial vaginosis)      Bacterial vaginosis in pregnancy      ABO   Date Value Ref Range Status   2019 O  Final     RH(D)   Date Value Ref Range Status   2019 Pos  Final     Antibody Screen   Date Value Ref Range Status   2019 Neg  Final   Rhogam  was not given.    - Reviewed total weight gain, encouraged continued healthy diet and exercise. Reviewed importance of daily fetal kick count and why/how to contact provider.    - Reviewed why/how to contact provider if headache/visual changes/RUQ or epigastric pain, decreased fetal movement, vaginal bleeding, leakage of fluid or more than 4 contractions in an hour.    - Rx for flagyl sent to pharmacy. Pt will report back if s/s do not improve.    Reviewed GBS screening at 35-36 wks.    Return to clinic in 2 weeks and prn if questions or concerns.     Valerie Alfaro CNM         VSS. Afebrile. Some aphasia and confusion noted.  Voiding without difficulty. Tolerating PO without difficulty. IV dressing clean, dry, and intact. Free from falls, injury, and trauma during this shift. Informed moonlighter of pt's 1915 Bp of 159/106. Moonlighter stated that he is ok with pt's bp, and to call him if DBP increases above 120. Purposeful hourly rounding completed.

## 2020-01-06 NOTE — PROGRESS NOTES
Subjective:      34 year old  at 31w4d presentst for a routine prenatal appointment.    Denies vaginal bleeding or leakage of fluid.  Denies contractions. Endorses fetal movement.       No HA, visual changes, RUQ or epigastric pain.   Patient concerns: greenish, creamy discharge, no itching associated now (maybe was before). This has been going on for about 3 weeks.  Feeling well overall.  Reviewed EOB labs with patient.  Reviewed TDAP Previously given     Objective:  Vitals:    20 1621   BP: 105/58   Weight: 68.6 kg (151 lb 4.8 oz)   See ob flowsheet    Wet prep + for clue cells, neg yeast, neg trich.    Assessment/Plan     Encounter Diagnoses   Name Primary?     Supervision of other normal pregnancy Yes     BV (bacterial vaginosis)      Bacterial vaginosis in pregnancy      ABO   Date Value Ref Range Status   2019 O  Final     RH(D)   Date Value Ref Range Status   2019 Pos  Final     Antibody Screen   Date Value Ref Range Status   2019 Neg  Final   Rhogam  was not given.    - Reviewed total weight gain, encouraged continued healthy diet and exercise. Reviewed importance of daily fetal kick count and why/how to contact provider.    - Reviewed why/how to contact provider if headache/visual changes/RUQ or epigastric pain, decreased fetal movement, vaginal bleeding, leakage of fluid or more than 4 contractions in an hour.    - Rx for flagyl sent to pharmacy. Pt will report back if s/s do not improve.    Reviewed GBS screening at 35-36 wks.    Return to clinic in 2 weeks and prn if questions or concerns.     Valerie Alfaro CNM

## 2020-01-07 PROBLEM — O23.599 BACTERIAL VAGINOSIS IN PREGNANCY: Status: ACTIVE | Noted: 2020-01-07

## 2020-01-07 PROBLEM — B96.89 BACTERIAL VAGINOSIS IN PREGNANCY: Status: ACTIVE | Noted: 2020-01-07

## 2020-01-17 ENCOUNTER — TELEPHONE (OUTPATIENT)
Dept: OBGYN | Facility: CLINIC | Age: 35
End: 2020-01-17

## 2020-01-17 NOTE — TELEPHONE ENCOUNTER
Spoke with Enid who reports that since completing treatment for BV she has slight vaginal itching and believes she has oral thrush.  Her tongue is coated yellow and has enlarged red bumps on tongue at the back. It is making her food takes bad.  She has tried to scrub it off and it comes back within a day.    Spoke with on-call midwife, Lisa Hill, who advised to give it some more time, can do salt water swishes. Metronidazole can alter food taste also. If it does not get better, will need to be seen in clinic. Take OTC Monistat 7 day for vaginal yeast symptoms.    Spoke again with Enid and gave her this message. She indicated understanding and agreed with plan.

## 2020-01-24 ENCOUNTER — OFFICE VISIT (OUTPATIENT)
Dept: OBGYN | Facility: CLINIC | Age: 35
End: 2020-01-24
Attending: ADVANCED PRACTICE MIDWIFE
Payer: COMMERCIAL

## 2020-01-24 VITALS
SYSTOLIC BLOOD PRESSURE: 101 MMHG | BODY MASS INDEX: 28.45 KG/M2 | DIASTOLIC BLOOD PRESSURE: 65 MMHG | HEART RATE: 99 BPM | WEIGHT: 154.6 LBS | HEIGHT: 62 IN

## 2020-01-24 DIAGNOSIS — Z34.80 SUPERVISION OF OTHER NORMAL PREGNANCY: Primary | ICD-10-CM

## 2020-01-24 PROCEDURE — G0463 HOSPITAL OUTPT CLINIC VISIT: HCPCS | Mod: ZF

## 2020-01-24 ASSESSMENT — MIFFLIN-ST. JEOR: SCORE: 1359.51

## 2020-01-24 NOTE — LETTER
"2020       RE: Enid Mcbride  [address]  [place] [address]     Dear Colleague,    Thank you for referring your patient, Enid Mcbride, to the [facility] at [facility]. Please see a copy of my visit note below.    Subjective:      34 year old  at 34w1d presentst for a routine prenatal appointment.     Denies contractions, vaginal bleeding, discharge or leakage of fluid. Reports +fetal movement.  No HA, vision changes, ruq/epigastric pain.      Patient concerns: Feeling well overall just \"very uncomfortable.\" States \"it is hell.\" Notes that she has a lot more round ligament and pubic pain and pressure than with her other pregnancies. Notes shooting pains by the end of the day, especially with more walking.   Would be interested in trying a maternity belt.      has a consult for a vasectomy.     Objective:  Vitals:    20 1459   BP: 101/65   BP Location: Left arm   Patient Position: Chair   Pulse: 99   Weight: 70.1 kg (154 lb 9.6 oz)   Height: 1.575 m (5' 2\")     See ob flowsheet    Assessment/Plan     Encounter Diagnosis   Name Primary?     Supervision of other normal pregnancy Yes       Orders Placed This Encounter   Medications     order for DME     Sig: Maternity Belt order     Dispense:  1 each     Refill:  0       - Reviewed total weight gain, encouraged continued healthy diet and exercise.  .  Reviewed importance of daily fetal kick count and why/how to contact provider.    - Reviewed why/how to contact provider if headache/visual changes/RUQ or epigastric pain, decreased fetal movement, vaginal bleeding, leakage of fluid or more than 4 contractions in an hour.     Patient education/orders or handouts today:  PTL signs/symptoms    - Maternity belt prescription given.  - CBC with plts at 36 weeks.  - No need for rescreening of GBS as pt was positive in UC. Plan prophylaxis in labor.     Continue scheduled prenatal " care, RTC in 2 weeks and prn if questions or concerns.      Nicol Lui, NIRAV, CNM

## 2020-01-24 NOTE — PROGRESS NOTES
"Subjective:      34 year old  at 34w1d presentst for a routine prenatal appointment.     Denies contractions, vaginal bleeding, discharge or leakage of fluid. Reports +fetal movement.  No HA, vision changes, ruq/epigastric pain.      Patient concerns: Feeling well overall just \"very uncomfortable.\" States \"it is hell.\" Notes that she has a lot more round ligament and pubic pain and pressure than with her other pregnancies. Notes shooting pains by the end of the day, especially with more walking.   Would be interested in trying a maternity belt.      has a consult for a vasectomy.     Objective:  Vitals:    20 1459   BP: 101/65   BP Location: Left arm   Patient Position: Chair   Pulse: 99   Weight: 70.1 kg (154 lb 9.6 oz)   Height: 1.575 m (5' 2\")     See ob flowsheet    Assessment/Plan     Encounter Diagnosis   Name Primary?     Supervision of other normal pregnancy Yes       Orders Placed This Encounter   Medications     order for DME     Sig: Maternity Belt order     Dispense:  1 each     Refill:  0       - Reviewed total weight gain, encouraged continued healthy diet and exercise.  .  Reviewed importance of daily fetal kick count and why/how to contact provider.    - Reviewed why/how to contact provider if headache/visual changes/RUQ or epigastric pain, decreased fetal movement, vaginal bleeding, leakage of fluid or more than 4 contractions in an hour.     Patient education/orders or handouts today:  PTL signs/symptoms    - Maternity belt prescription given.  - CBC with plts at 36 weeks.  - No need for rescreening of GBS as pt was positive in UC. Plan prophylaxis in labor.     Continue scheduled prenatal care, RTC in 2 weeks and prn if questions or concerns.      Nicol Lui, NIRAV, CNM   "

## 2020-01-29 ENCOUNTER — TELEPHONE (OUTPATIENT)
Dept: OBGYN | Facility: CLINIC | Age: 35
End: 2020-01-29

## 2020-01-29 RX ORDER — BREAST PUMP
1 EACH MISCELLANEOUS DAILY PRN
Qty: 1 EACH | Refills: 0 | Status: SHIPPED | OUTPATIENT
Start: 2020-01-29 | End: 2020-10-30

## 2020-01-29 NOTE — TELEPHONE ENCOUNTER
Pt called to report rapid onset dry cough, fatigue, muscle aches today. Denies fever, denies nasal or chest congestion. Endorses sore throat.    Nurse advised pt to present to urgent care for evaluation. If positive influenza and urgent care unwilling to order tamiflu, advised to call to get this order.     Discussed medications safe to take in pregnancy and recommended fluids and rest.     Pt expressed understanding and agrees with plan. WIll present to urgent care after work.

## 2020-01-30 NOTE — TELEPHONE ENCOUNTER
34 year old  at 34w6d  - cough and symptoms started yesterday.  +Influenza B at urgent care.  On her way to  tamiflu. Is going to go home and drink water/rest for 2 hours.  If still feeling crampy or any VB, decreased fetal movement, or LOF will come to hospital for work up.  NIRAV AcostaM

## 2020-02-01 ENCOUNTER — TELEPHONE (OUTPATIENT)
Dept: OBGYN | Facility: CLINIC | Age: 35
End: 2020-02-01

## 2020-02-01 ENCOUNTER — HOSPITAL ENCOUNTER (OUTPATIENT)
Facility: CLINIC | Age: 35
End: 2020-02-01
Admitting: ADVANCED PRACTICE MIDWIFE
Payer: COMMERCIAL

## 2020-02-01 ENCOUNTER — HOSPITAL ENCOUNTER (OUTPATIENT)
Facility: CLINIC | Age: 35
Discharge: HOME | End: 2020-02-01
Attending: EMERGENCY MEDICINE | Admitting: ADVANCED PRACTICE MIDWIFE
Payer: COMMERCIAL

## 2020-02-01 VITALS
OXYGEN SATURATION: 99 % | RESPIRATION RATE: 18 BRPM | DIASTOLIC BLOOD PRESSURE: 56 MMHG | TEMPERATURE: 98.3 F | SYSTOLIC BLOOD PRESSURE: 89 MMHG | HEART RATE: 80 BPM

## 2020-02-01 DIAGNOSIS — J10.1 INFLUENZA B: ICD-10-CM

## 2020-02-01 DIAGNOSIS — Z3A.35 35 WEEKS GESTATION OF PREGNANCY: ICD-10-CM

## 2020-02-01 PROBLEM — O26.90 PREGNANCY COMPLICATION: Status: ACTIVE | Noted: 2020-02-01

## 2020-02-01 PROBLEM — Z36.89 ENCOUNTER FOR TRIAGE IN PREGNANT PATIENT: Status: ACTIVE | Noted: 2020-02-01

## 2020-02-01 PROCEDURE — G0463 HOSPITAL OUTPT CLINIC VISIT: HCPCS | Mod: 25

## 2020-02-01 PROCEDURE — 59025 FETAL NON-STRESS TEST: CPT

## 2020-02-01 PROCEDURE — 25800030 ZZH RX IP 258 OP 636: Performed by: ADVANCED PRACTICE MIDWIFE

## 2020-02-01 PROCEDURE — 96360 HYDRATION IV INFUSION INIT: CPT

## 2020-02-01 PROCEDURE — 99284 EMERGENCY DEPT VISIT MOD MDM: CPT | Mod: Z6 | Performed by: EMERGENCY MEDICINE

## 2020-02-01 PROCEDURE — 25000132 ZZH RX MED GY IP 250 OP 250 PS 637: Performed by: ADVANCED PRACTICE MIDWIFE

## 2020-02-01 PROCEDURE — 99285 EMERGENCY DEPT VISIT HI MDM: CPT | Mod: 25 | Performed by: EMERGENCY MEDICINE

## 2020-02-01 RX ORDER — ALUMINA, MAGNESIA, AND SIMETHICONE 2400; 2400; 240 MG/30ML; MG/30ML; MG/30ML
30 SUSPENSION ORAL
Status: DISCONTINUED | OUTPATIENT
Start: 2020-02-01 | End: 2020-02-01 | Stop reason: HOSPADM

## 2020-02-01 RX ORDER — ONDANSETRON 2 MG/ML
4 INJECTION INTRAMUSCULAR; INTRAVENOUS EVERY 6 HOURS PRN
Status: DISCONTINUED | OUTPATIENT
Start: 2020-02-01 | End: 2020-02-01 | Stop reason: HOSPADM

## 2020-02-01 RX ORDER — HYDROXYZINE HYDROCHLORIDE 50 MG/1
50-100 TABLET, FILM COATED ORAL EVERY 6 HOURS PRN
Status: DISCONTINUED | OUTPATIENT
Start: 2020-02-01 | End: 2020-02-01 | Stop reason: HOSPADM

## 2020-02-01 RX ORDER — ACETAMINOPHEN 325 MG/1
650-975 TABLET ORAL EVERY 4 HOURS PRN
Status: DISCONTINUED | OUTPATIENT
Start: 2020-02-01 | End: 2020-02-01 | Stop reason: HOSPADM

## 2020-02-01 RX ADMIN — SODIUM CHLORIDE, POTASSIUM CHLORIDE, SODIUM LACTATE AND CALCIUM CHLORIDE 1000 ML: 600; 310; 30; 20 INJECTION, SOLUTION INTRAVENOUS at 16:15

## 2020-02-01 RX ADMIN — ACETAMINOPHEN 975 MG: 325 TABLET, FILM COATED ORAL at 15:48

## 2020-02-01 ASSESSMENT — ENCOUNTER SYMPTOMS
MYALGIAS: 1
RHINORRHEA: 1
DIZZINESS: 0
ACTIVITY CHANGE: 1
LIGHT-HEADEDNESS: 0
APPETITE CHANGE: 1
FEVER: 0
SHORTNESS OF BREATH: 1
COUGH: 1
CHILLS: 1
ABDOMINAL PAIN: 0
FATIGUE: 1

## 2020-02-01 NOTE — ED TRIAGE NOTES
"Pt states she was diagnosed with influenza B on Wednesday and sent home with tamiflu, which she has been taking. Pt states sx have been worsening, is SOB, and \"it burns when I breath\".  "

## 2020-02-01 NOTE — PROVIDER NOTIFICATION
At 1645 patient sitting forward in bed coughing.  Maternal rate (110s)  tracing while patient in this position. When coughing stopped patient able to lean back for continued fetal monitoring.

## 2020-02-01 NOTE — TELEPHONE ENCOUNTER
35w2d  TC from pt c/o chest pain. Pt was diagnosed with influenza B 1/29/20 and started on Tamiflu.  Calling today stating she has been feeling progressively worse with fever, chills, sweats, chest pain and SOB. Also states today she had a bloody nose that was hard to control. Not actively having a nose bleed now.  Denies contr, cramping, vaginal bleeding or decreased FM.  Instructed pt to come to the ER for evaluation and have her  bring her. Agreeable to plan. NIRAV Rachel CNM

## 2020-02-01 NOTE — PROGRESS NOTES
Blood pressure (!) 89/56, pulse 122, temperature 98.3  F (36.8  C), temperature source Oral, resp. rate 18, last menstrual period 05/30/2019, SpO2 99 %, not currently breastfeeding.  Patient Vitals for the past 24 hrs:   BP Temp Temp src Pulse Resp SpO2   02/01/20 1500 (!) 89/56 98.3  F (36.8  C) Oral -- 18 --   02/01/20 1445 95/73 97  F (36.1  C) Oral 122 20 99 %   02/01/20 1308 113/75 97.2  F (36.2  C) Oral 129 20 99 %     General appearance: comfortable  Resting on side. occas cough and clear rhinitis  IV bolus influsing, had taken tylenol  CONTACTIONS: none and not felt by patient  FETAL HEART TONES: continuous EFM- baseline 150 with moderate variability and No decelerations.  ROM: not ruptured  PELVIC EXAM:deferred    No results found for this or any previous visit (from the past 24 hour(s)).    ASSESSMENT:  ==============  IUP @ 35w2d    Influenza B- lungs clear, afebrile  Back pain, third trimester- no contractions, vag ischarge, bleeding. Not in labor  Mild tachycardia- IV bolus infusing, encouraged increase in fluids  Fetal Heart Rate Tracing category one  Patient Active Problem List   Diagnosis     Vestibular migraine     Syrinx of spinal cord (H)     Family history of deafness and hearing loss     History of abnormal cervical Pap smear     Anxiety managed      Family history of cleft palate     Supervision of other normal pregnancy     GBS (group B streptococcus) UTI complicating pregnancy     Bacterial vaginosis in pregnancy     Encounter for triage in pregnant patient     Pregnancy complication       PLAN:  ===========  Discharge home when IV bolus infused. To return to ER or birthplace if influenza symptoms worsen, fever, SOB. Pt agreeable to plan.  Discharge to home with labor instuctions per discharge instruction form  Call or return to the Birthplace with contractions, cramping, abdominal or pelvic pain, vaginal bleeding, leaking fluid or decreased fetal movement.  Samanta Ramsay, APRN  CNM
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
## 2020-02-01 NOTE — ED AVS SNAPSHOT
[facility], [place], Emergency Department  [address]  [facility] [address]  Phone:  [phone]  Fax:  [phone]                                    Enid Mcbride   MRN: [id number]    Department:  [facility], Emergency Department   Date of Visit:  2/1/2020           After Visit Summary Signature Page    I have received my discharge instructions, and my questions have been answered. I have discussed any challenges I see with this plan with the nurse or doctor.    ..........................................................................................................................................  Patient/Patient Representative Signature      ..........................................................................................................................................  Patient Representative Print Name and Relationship to Patient    ..................................................               ................................................  Date                                   Time    ..........................................................................................................................................  Reviewed by Signature/Title    ...................................................              ..............................................  Date                                               Time          22EPIC Rev 08/18

## 2020-02-01 NOTE — PLAN OF CARE
IV fluids complete, patient reports less discomfort.  IV removed, discharge instructions reviewed and sent home with patient. Patient verbalized understanding.  Home per self at [id number]
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
## 2020-02-06 ENCOUNTER — OFFICE VISIT (OUTPATIENT)
Dept: OBGYN | Facility: CLINIC | Age: 35
End: 2020-02-06
Attending: MIDWIFE
Payer: COMMERCIAL

## 2020-02-06 VITALS
HEART RATE: 85 BPM | DIASTOLIC BLOOD PRESSURE: 63 MMHG | HEIGHT: 62 IN | BODY MASS INDEX: 27.71 KG/M2 | WEIGHT: 150.6 LBS | SYSTOLIC BLOOD PRESSURE: 93 MMHG

## 2020-02-06 DIAGNOSIS — R05.9 COUGH: ICD-10-CM

## 2020-02-06 DIAGNOSIS — Z34.80 SUPERVISION OF OTHER NORMAL PREGNANCY: Primary | ICD-10-CM

## 2020-02-06 PROBLEM — B96.89 BACTERIAL VAGINOSIS IN PREGNANCY: Status: RESOLVED | Noted: 2020-01-07 | Resolved: 2020-02-06

## 2020-02-06 PROBLEM — Z36.89 ENCOUNTER FOR TRIAGE IN PREGNANT PATIENT: Status: RESOLVED | Noted: 2020-02-01 | Resolved: 2020-02-06

## 2020-02-06 PROBLEM — O23.599 BACTERIAL VAGINOSIS IN PREGNANCY: Status: RESOLVED | Noted: 2020-01-07 | Resolved: 2020-02-06

## 2020-02-06 LAB
ERYTHROCYTE [DISTWIDTH] IN BLOOD BY AUTOMATED COUNT: 13.2 % (ref 10–15)
HCT VFR BLD AUTO: 36.9 % (ref 35–47)
HGB BLD-MCNC: 11.7 G/DL (ref 11.7–15.7)
MCH RBC QN AUTO: 27.8 PG (ref 26.5–33)
MCHC RBC AUTO-ENTMCNC: 31.7 G/DL (ref 31.5–36.5)
MCV RBC AUTO: 88 FL (ref 78–100)
PLATELET # BLD AUTO: 216 10E9/L (ref 150–450)
RBC # BLD AUTO: 4.21 10E12/L (ref 3.8–5.2)
WBC # BLD AUTO: 8.1 10E9/L (ref 4–11)

## 2020-02-06 PROCEDURE — G0463 HOSPITAL OUTPT CLINIC VISIT: HCPCS

## 2020-02-06 PROCEDURE — 87653 STREP B DNA AMP PROBE: CPT | Performed by: MIDWIFE

## 2020-02-06 PROCEDURE — 36415 COLL VENOUS BLD VENIPUNCTURE: CPT | Performed by: MIDWIFE

## 2020-02-06 PROCEDURE — 85027 COMPLETE CBC AUTOMATED: CPT | Performed by: MIDWIFE

## 2020-02-06 RX ORDER — CODEINE PHOSPHATE AND GUAIFENESIN 10; 100 MG/5ML; MG/5ML
1-2 SOLUTION ORAL EVERY 4 HOURS PRN
Qty: 180 ML | Refills: 0 | Status: SHIPPED | OUTPATIENT
Start: 2020-02-06 | End: 2020-02-19

## 2020-02-06 ASSESSMENT — ANXIETY QUESTIONNAIRES
5. BEING SO RESTLESS THAT IT IS HARD TO SIT STILL: NOT AT ALL
IF YOU CHECKED OFF ANY PROBLEMS ON THIS QUESTIONNAIRE, HOW DIFFICULT HAVE THESE PROBLEMS MADE IT FOR YOU TO DO YOUR WORK, TAKE CARE OF THINGS AT HOME, OR GET ALONG WITH OTHER PEOPLE: NOT DIFFICULT AT ALL
GAD7 TOTAL SCORE: 0
3. WORRYING TOO MUCH ABOUT DIFFERENT THINGS: NOT AT ALL
7. FEELING AFRAID AS IF SOMETHING AWFUL MIGHT HAPPEN: NOT AT ALL
6. BECOMING EASILY ANNOYED OR IRRITABLE: NOT AT ALL
1. FEELING NERVOUS, ANXIOUS, OR ON EDGE: NOT AT ALL
2. NOT BEING ABLE TO STOP OR CONTROL WORRYING: NOT AT ALL

## 2020-02-06 ASSESSMENT — PATIENT HEALTH QUESTIONNAIRE - PHQ9
SUM OF ALL RESPONSES TO PHQ QUESTIONS 1-9: 1
5. POOR APPETITE OR OVEREATING: NOT AT ALL

## 2020-02-06 ASSESSMENT — MIFFLIN-ST. JEOR: SCORE: 1341.5

## 2020-02-06 ASSESSMENT — PAIN SCALES - GENERAL: PAINLEVEL: NO PAIN (0)

## 2020-02-06 NOTE — PROGRESS NOTES
"Subjective:      34 year old  at 36w0d presents for a routine prenatal appointment.    Denies vaginal bleeding,  leakage of fluid, or change in vaginal discharge.  denies contractions.  endorses fetal movement.   No HA, visual changes, RUQ or epigastric pain.   Patient concerns:  Feeling miserable overall, influenza B (tamiflu on 20) and has cough that is causing shooting pelvic pain during coughing. Seen in triage 20 for these symptoms. She reports overall improvement of body aches , no fever or chills  Denies SOB only persistent cough making it difficult to sleep at night       Objective:  Vitals:    20 0842   BP: 93/63   Pulse: 85   Weight: 68.3 kg (150 lb 9.6 oz)   Height: 1.575 m (5' 2.01\")    See OB flowsheet    O2 sats: 97%  Lungs: clear to auscultation bilaterally      Assessment/Plan     Encounter Diagnoses   Name Primary?     Supervision of other normal pregnancy Yes     Cough      Orders Placed This Encounter   Procedures     CBC with Platelets     Orders Placed This Encounter   Medications     guaiFENesin-codeine (ROBITUSSIN AC) 100-10 MG/5ML solution     Sig: Take 5-10 mLs by mouth every 4 hours as needed for cough     Dispense:  180 mL     Refill:  0     albuterol (PROAIR RESPICLICK) 108 (90 Base) MCG/ACT inhaler     Sig: Inhale 2 puffs into the lungs every 6 hours as needed for shortness of breath / dyspnea or wheezing     Dispense:  1 Inhaler     Refill:  0       PHQ-9 SCORE 2019   PHQ-9 Total Score 0 0 1     GBS screening: Not indicated - present in urine. Plan prophylaxis in labor.  Labor signs discussed. Reinforced daily fetal movement counts.  Reviewed why/how to contact provider if headache/visual changes/RUQ or epigastric pain, decreased fetal movement, vaginal bleeding, leakage of fluid.  Rx Robitussin w/ codeine and albuterol inhaler  36 wk labs    Return to clinic in 1 week and prn if questions or concerns.     Opal AKERS SNM, am serving " as a scribe to document services personally performed by Milvia Rowland CNM based on the provider's statements to me.- JACKIE Mg  The encounter was performed by me and scribed by the SNM. The scribed note accurately reflects my personal services and decisions made by me.   Milvia Arriaga CNM APRN

## 2020-02-06 NOTE — LETTER
"2020       RE: Enid Mcbride  [address]  [place] [address]     Dear Colleague,    Thank you for referring your patient, Enid Mcbride, to the [facility] at [facility]. Please see a copy of my visit note below.    Subjective:      34 year old  at 36w0d presents for a routine prenatal appointment.    Denies vaginal bleeding,  leakage of fluid, or change in vaginal discharge.  denies contractions.  endorses fetal movement.   No HA, visual changes, RUQ or epigastric pain.   Patient concerns:  Feeling miserable overall, influenza B (tamiflu on 20) and has cough that is causing shooting pelvic pain during coughing. Seen in triage 20 for these symptoms. She reports overall improvement of body aches , no fever or chills  Denies SOB only persistent cough making it difficult to sleep at night       Objective:  Vitals:    20 0842   BP: 93/63   Pulse: 85   Weight: 68.3 kg (150 lb 9.6 oz)   Height: 1.575 m (5' 2.01\")    See OB flowsheet    O2 sats: 97%  Lungs: clear to auscultation bilaterally      Assessment/Plan     Encounter Diagnoses   Name Primary?     Supervision of other normal pregnancy Yes     Cough      Orders Placed This Encounter   Procedures     CBC with Platelets     Orders Placed This Encounter   Medications     guaiFENesin-codeine (ROBITUSSIN AC) 100-10 MG/5ML solution     Sig: Take 5-10 mLs by mouth every 4 hours as needed for cough     Dispense:  180 mL     Refill:  0     albuterol (PROAIR RESPICLICK) 108 (90 Base) MCG/ACT inhaler     Sig: Inhale 2 puffs into the lungs every 6 hours as needed for shortness of breath / dyspnea or wheezing     Dispense:  1 Inhaler     Refill:  0       PHQ-9 SCORE 2019   PHQ-9 Total Score 0 0 1     GBS screening: Not indicated - present in urine. Plan prophylaxis in labor.  Labor signs discussed. Reinforced daily fetal movement counts.  Reviewed why/how to " contact provider if headache/visual changes/RUQ or epigastric pain, decreased fetal movement, vaginal bleeding, leakage of fluid.  Rx Robitussin w/ codeine and albuterol inhaler  36 wk labs    Return to clinic in 1 week and prn if questions or concerns.     I, JACKIE Mg, am serving as a scribe to document services personally performed by Milvia Rowland CNM based on the provider's statements to me.- JACKIE Mg  The encounter was performed by me and scribed by the SNM. The scribed note accurately reflects my personal services and decisions made by me.     Milvia Arriaga CNM APRN

## 2020-02-07 ASSESSMENT — ANXIETY QUESTIONNAIRES: GAD7 TOTAL SCORE: 0

## 2020-02-14 ENCOUNTER — OFFICE VISIT (OUTPATIENT)
Dept: OBGYN | Facility: CLINIC | Age: 35
End: 2020-02-14
Attending: ADVANCED PRACTICE MIDWIFE
Payer: COMMERCIAL

## 2020-02-14 VITALS
WEIGHT: 153.2 LBS | SYSTOLIC BLOOD PRESSURE: 101 MMHG | HEIGHT: 62 IN | HEART RATE: 102 BPM | BODY MASS INDEX: 28.19 KG/M2 | DIASTOLIC BLOOD PRESSURE: 69 MMHG

## 2020-02-14 DIAGNOSIS — B95.1 GROUP B STREPTOCOCCUS URINARY TRACT INFECTION AFFECTING PREGNANCY IN THIRD TRIMESTER: ICD-10-CM

## 2020-02-14 DIAGNOSIS — O23.43 GROUP B STREPTOCOCCUS URINARY TRACT INFECTION AFFECTING PREGNANCY IN THIRD TRIMESTER: ICD-10-CM

## 2020-02-14 DIAGNOSIS — Z34.80 SUPERVISION OF OTHER NORMAL PREGNANCY: Primary | ICD-10-CM

## 2020-02-14 PROCEDURE — G0463 HOSPITAL OUTPT CLINIC VISIT: HCPCS | Mod: ZF

## 2020-02-14 ASSESSMENT — MIFFLIN-ST. JEOR: SCORE: 1353.32

## 2020-02-14 NOTE — PROGRESS NOTES
"Subjective:     34 year old  at 37w1d presents for routine prenatal visit.           Denies cramping/contractions, vaginal bleeding, discharge or leakage of fluid. Reports +fetal movement.  No HA, vision changes, ruq/epigastric pain.      Patient concerns: Feeling well overall- much better than a couple of weeks ago when she had influenza B. Notes only some residual sinus congestion that is tolerable.  Wants to meet baby!    Objective:  Vitals:    20 0835   BP: 101/69   Pulse: 102   Weight: 69.5 kg (153 lb 3.2 oz)   Height: 1.575 m (5' 2.01\")      See OB flowsheet    Palpable FM, cephalic, FHTS 140    Assessment/Plan     Encounter Diagnoses   Name Primary?     Supervision of other normal pregnancy Yes     Group B Streptococcus urinary tract infection affecting pregnancy in third trimester      - Reviewed why/how to contact provider if headache/visual changes/RUQ or epigastric pain, decreased fetal movement, vaginal bleeding, leakage of fluid or strong/regular contractions.   Patient education/orders or handouts today:  Sign/symptoms of labor and When to call for labor or other concerns    - Reviewed GBS + (from  ).  Plan PCN prophylaxis in labor. Pt agreeable.   - Reviewed normal CBC with plts.    Continue scheduled prenatal care, RTC in 1 week and prn if questions or concerns.      NIRAV Boggs, CORINNA   "

## 2020-02-14 NOTE — LETTER
"2020       RE: Enid Mcbride  [address]  [place] [address]     Dear Colleague,    Thank you for referring your patient, Enid Mcbride, to the [facility] at [facility]. Please see a copy of my visit note below.    Subjective:     34 year old  at 37w1d presents for routine prenatal visit.           Denies cramping/contractions, vaginal bleeding, discharge or leakage of fluid. Reports +fetal movement.  No HA, vision changes, ruq/epigastric pain.      Patient concerns: Feeling well overall- much better than a couple of weeks ago when she had influenza B. Notes only some residual sinus congestion that is tolerable.  Wants to meet baby!    Objective:  Vitals:    20 0835   BP: 101/69   Pulse: 102   Weight: 69.5 kg (153 lb 3.2 oz)   Height: 1.575 m (5' 2.01\")   See OB flowsheet    Palpable FM, cephalic, FHTS 140    Assessment/Plan  Encounter Diagnoses   Name Primary?     Supervision of other normal pregnancy Yes     Group B Streptococcus urinary tract infection affecting pregnancy in third trimester      - Reviewed why/how to contact provider if headache/visual changes/RUQ or epigastric pain, decreased fetal movement, vaginal bleeding, leakage of fluid or strong/regular contractions.   Patient education/orders or handouts today:  Sign/symptoms of labor and When to call for labor or other concerns    - Reviewed GBS + (from  ).  Plan PCN prophylaxis in labor. Pt agreeable.   - Reviewed normal CBC with plts.    Continue scheduled prenatal care, RTC in 1 week and prn if questions or concerns.      NIRAV Boggs, CNM         "

## 2020-02-19 ENCOUNTER — OFFICE VISIT (OUTPATIENT)
Dept: OBGYN | Facility: CLINIC | Age: 35
End: 2020-02-19
Attending: ADVANCED PRACTICE MIDWIFE
Payer: COMMERCIAL

## 2020-02-19 VITALS
HEART RATE: 82 BPM | WEIGHT: 155.4 LBS | BODY MASS INDEX: 28.6 KG/M2 | SYSTOLIC BLOOD PRESSURE: 99 MMHG | DIASTOLIC BLOOD PRESSURE: 64 MMHG | HEIGHT: 62 IN

## 2020-02-19 DIAGNOSIS — Z34.80 SUPERVISION OF OTHER NORMAL PREGNANCY: Primary | ICD-10-CM

## 2020-02-19 PROCEDURE — G0463 HOSPITAL OUTPT CLINIC VISIT: HCPCS | Mod: ZF

## 2020-02-19 ASSESSMENT — MIFFLIN-ST. JEOR: SCORE: 1363.14

## 2020-02-19 ASSESSMENT — PAIN SCALES - GENERAL: PAINLEVEL: NO PAIN (0)

## 2020-02-19 NOTE — PROGRESS NOTES
"Subjective:     34 year old  at 37w6d presents for routine prenatal visit.           Patient concerns: Feeling well overall. Notices B-H contractions very frequently throughout the day. Not painful. More at night. Denies regular contractions, vaginal bleeding, discharge or leakage of fluid. Reports +fetal movement.  No HA, vision changes, ruq/epigastric pain.      Objective:  Vitals:    20 1622   BP: 99/64   Pulse: 82   Weight: 70.5 kg (155 lb 6.4 oz)   Height: 1.575 m (5' 2\")      See OB flowsheet    Assessment/Plan     Encounter Diagnosis   Name Primary?     Supervision of other normal pregnancy Yes       - Reviewed why/how to contact provider if headache/visual changes/RUQ or epigastric pain, decreased fetal movement, vaginal bleeding, leakage of fluid or strong/regular contractions.   Patient education/orders or handouts today:  Sign/symptoms of labor and When to call for labor or other concerns    Continue scheduled prenatal care, RTC in 1 week and prn if questions or concerns.      NIRAV Boggs, CNM    "

## 2020-02-19 NOTE — LETTER
"2020       RE: Enid Mcbride  [address]  [place] [address]     Dear Colleague,    Thank you for referring your patient, Enid Mcbride, to the [facility] at [facility]. Please see a copy of my visit note below.    Subjective:     34 year old  at 37w6d presents for routine prenatal visit.           Patient concerns: Feeling well overall. Notices B-H contractions very frequently throughout the day. Not painful. More at night. Denies regular contractions, vaginal bleeding, discharge or leakage of fluid. Reports +fetal movement.  No HA, vision changes, ruq/epigastric pain.      Objective:  Vitals:    20 1622   BP: 99/64   Pulse: 82   Weight: 70.5 kg (155 lb 6.4 oz)   Height: 1.575 m (5' 2\")      See OB flowsheet    Assessment/Plan     Encounter Diagnosis   Name Primary?     Supervision of other normal pregnancy Yes       - Reviewed why/how to contact provider if headache/visual changes/RUQ or epigastric pain, decreased fetal movement, vaginal bleeding, leakage of fluid or strong/regular contractions.   Patient education/orders or handouts today:  Sign/symptoms of labor and When to call for labor or other concerns    Continue scheduled prenatal care, RTC in 1 week and prn if questions or concerns.      NIRAV Boggs, CORINNA  "

## 2020-02-27 ENCOUNTER — OFFICE VISIT (OUTPATIENT)
Dept: OBGYN | Facility: CLINIC | Age: 35
End: 2020-02-27
Attending: ADVANCED PRACTICE MIDWIFE
Payer: COMMERCIAL

## 2020-02-27 VITALS
WEIGHT: 155.7 LBS | HEART RATE: 89 BPM | DIASTOLIC BLOOD PRESSURE: 63 MMHG | SYSTOLIC BLOOD PRESSURE: 98 MMHG | BODY MASS INDEX: 28.65 KG/M2 | HEIGHT: 62 IN

## 2020-02-27 DIAGNOSIS — Z34.80 SUPERVISION OF OTHER NORMAL PREGNANCY: Primary | ICD-10-CM

## 2020-02-27 PROCEDURE — G0463 HOSPITAL OUTPT CLINIC VISIT: HCPCS | Mod: ZF

## 2020-02-27 ASSESSMENT — MIFFLIN-ST. JEOR: SCORE: 1364.63

## 2020-02-27 ASSESSMENT — PAIN SCALES - GENERAL: PAINLEVEL: NO PAIN (0)

## 2020-02-27 NOTE — LETTER
"2020       RE: Enid Mcbride  [address]  [place] [address]     Dear Colleague,    Thank you for referring your patient, Enid Mcbride, to the [facility] at [facility]. Please see a copy of my visit note below.    Subjective:     34 year old  at 39w0d presents for routine prenatal visit.           Patient concerns: Feeling well overall. Has previously had BH ctx but notes a change in the last week. Reports that the cramping has become more uncomfortable. Thinks she will go into labor in the next couple of weeks.     Objective:  Vitals:    20 0924   BP: 98/63   Pulse: 89   Weight: 70.6 kg (155 lb 11.2 oz)   Height: 1.575 m (5' 2.01\")   See OB flowsheet    Assessment/Plan  Encounter Diagnosis   Name Primary?     Supervision of other normal pregnancy Yes     - Reviewed why/how to contact provider if headache/visual changes/RUQ or epigastric pain, decreased fetal movement, vaginal bleeding, leakage of fluid or strong/regular contractions.   Patient education/orders or handouts today:  Sign/symptoms of labor, When to call for labor or other concerns, BPP, NST and Induction of labor    - Reviewed late term testing including BPP => 41 weeks and rationale for induction of labor based on results.   Patient is undecided. Leaning towards IOL if undelivered.  - Interested in sve and membrane sweep next week.     Continue scheduled prenatal care, RTC in 1 week and prn if questions or concerns.      Nicol Lui, NIRAV, CNM         "

## 2020-03-05 ENCOUNTER — OFFICE VISIT (OUTPATIENT)
Dept: OBGYN | Facility: CLINIC | Age: 35
End: 2020-03-05
Attending: ADVANCED PRACTICE MIDWIFE
Payer: COMMERCIAL

## 2020-03-05 VITALS
WEIGHT: 155.5 LBS | DIASTOLIC BLOOD PRESSURE: 76 MMHG | HEIGHT: 62 IN | SYSTOLIC BLOOD PRESSURE: 104 MMHG | BODY MASS INDEX: 28.61 KG/M2 | HEART RATE: 85 BPM

## 2020-03-05 DIAGNOSIS — Z34.80 SUPERVISION OF OTHER NORMAL PREGNANCY: Primary | ICD-10-CM

## 2020-03-05 PROCEDURE — G0463 HOSPITAL OUTPT CLINIC VISIT: HCPCS | Mod: ZF

## 2020-03-05 ASSESSMENT — PAIN SCALES - GENERAL: PAINLEVEL: NO PAIN (0)

## 2020-03-05 ASSESSMENT — MIFFLIN-ST. JEOR: SCORE: 1363.59

## 2020-03-05 NOTE — LETTER
"3/5/2020       RE: Enid Mcbride  [address]  [place] [address]     Dear Colleague,    Thank you for referring your patient, Enid Mcbride, to the [facility] at [facility]. Please see a copy of my visit note below.    Subjective:     34 year old  at 40w0d presents for routine prenatal visit.   Denies vaginal bleeding or leakage of fluid.  BH contractions, occ mild cramping.  + fetal movement.       No HA, visual changes, RUQ or epigastric pain.   Patient concerns: Feeling well overall. Has felt significantly more comfortable these past couple weeks.   - Lost mucous plug over past 2 days. Would like cervical exam, declines membrane sweep    Objective:  Vitals:    20 0931   BP: 104/76   Pulse: 85   Weight: 70.5 kg (155 lb 8 oz)   Height: 1.575 m (5' 2\")    See OB flowsheet    Cervix: 3-4cm/40-50%/-2, very soft stretchy, mid-position    Assessment/Plan  Encounter Diagnosis   Name Primary?     Supervision of other normal pregnancy Yes     Orders Placed This Encounter   Procedures     US OB Fetal Biophys Prf wo NonStrs Singls Sgl     - Reviewed postdates testing including BPP => 41 weeks and rationale for induction of labor based on results. Patient desires postdates testing.  - Reviewed why/how to contact provider if headache/visual changes/RUQ or epigastric pain, decreased fetal movement, vaginal bleeding, leakage of fluid or strong/regular contractions.  - Patient education/orders or handouts today: Sign/symptoms of labor, When to call for labor or other concerns, Postdates testing discussion, BPP and Induction of labor  - Return to clinic in 1 week for BPP & visit, prn if questions or concerns.     Shayne Tavares, NIRAV WEINSTEIN        "

## 2020-03-05 NOTE — PROGRESS NOTES
"Subjective:     34 year old  at 40w0d presents for routine prenatal visit.   Denies vaginal bleeding or leakage of fluid.  BH contractions, occ mild cramping.  + fetal movement.       No HA, visual changes, RUQ or epigastric pain.   Patient concerns: Feeling well overall. Has felt significantly more comfortable these past couple weeks.   - Lost mucous plug over past 2 days. Would like cervical exam, declines membrane sweep    Objective:  Vitals:    20 0931   BP: 104/76   Pulse: 85   Weight: 70.5 kg (155 lb 8 oz)   Height: 1.575 m (5' 2\")    See OB flowsheet    Cervix: 3-4cm/40-50%/-2, very soft stretchy, mid-position    Assessment/Plan  Encounter Diagnosis   Name Primary?     Supervision of other normal pregnancy Yes     Orders Placed This Encounter   Procedures     US OB Fetal Biophys Prf wo NonStrs Singls Sgl     - Reviewed postdates testing including BPP => 41 weeks and rationale for induction of labor based on results. Patient desires postdates testing.  - Reviewed why/how to contact provider if headache/visual changes/RUQ or epigastric pain, decreased fetal movement, vaginal bleeding, leakage of fluid or strong/regular contractions.  - Patient education/orders or handouts today: Sign/symptoms of labor, When to call for labor or other concerns, Postdates testing discussion, BPP and Induction of labor  - Return to clinic in 1 week for BPP & visit, prn if questions or concerns.     NIRAV De Jesus CNM  "

## 2020-03-10 ENCOUNTER — ANESTHESIA (OUTPATIENT)
Dept: OBGYN | Facility: CLINIC | Age: 35
End: 2020-03-10
Payer: COMMERCIAL

## 2020-03-10 ENCOUNTER — ANESTHESIA EVENT (OUTPATIENT)
Dept: OBGYN | Facility: CLINIC | Age: 35
End: 2020-03-10
Payer: COMMERCIAL

## 2020-03-10 ENCOUNTER — HOSPITAL ENCOUNTER (INPATIENT)
Facility: CLINIC | Age: 35
LOS: 2 days | Discharge: HOME | End: 2020-03-12
Attending: ADVANCED PRACTICE MIDWIFE | Admitting: ADVANCED PRACTICE MIDWIFE
Payer: COMMERCIAL

## 2020-03-10 DIAGNOSIS — D62 ANEMIA DUE TO BLOOD LOSS, ACUTE: ICD-10-CM

## 2020-03-10 PROBLEM — Z36.89 ENCOUNTER FOR TRIAGE IN PREGNANT PATIENT: Status: ACTIVE | Noted: 2020-03-10

## 2020-03-10 LAB
ABO + RH BLD: NORMAL
ABO + RH BLD: NORMAL
BASOPHILS # BLD AUTO: 0 10E9/L (ref 0–0.2)
BASOPHILS NFR BLD AUTO: 0.1 %
BLD GP AB SCN SERPL QL: NORMAL
BLOOD BANK CMNT PATIENT-IMP: NORMAL
DIFFERENTIAL METHOD BLD: NORMAL
EOSINOPHIL # BLD AUTO: 0.1 10E9/L (ref 0–0.7)
EOSINOPHIL NFR BLD AUTO: 0.6 %
ERYTHROCYTE [DISTWIDTH] IN BLOOD BY AUTOMATED COUNT: 14 % (ref 10–15)
HCT VFR BLD AUTO: 37.6 % (ref 35–47)
HGB BLD-MCNC: 12 G/DL (ref 11.7–15.7)
IMM GRANULOCYTES # BLD: 0 10E9/L (ref 0–0.4)
IMM GRANULOCYTES NFR BLD: 0.1 %
LYMPHOCYTES # BLD AUTO: 2 10E9/L (ref 0.8–5.3)
LYMPHOCYTES NFR BLD AUTO: 23.9 %
MCH RBC QN AUTO: 27.3 PG (ref 26.5–33)
MCHC RBC AUTO-ENTMCNC: 31.9 G/DL (ref 31.5–36.5)
MCV RBC AUTO: 86 FL (ref 78–100)
MONOCYTES # BLD AUTO: 0.4 10E9/L (ref 0–1.3)
MONOCYTES NFR BLD AUTO: 4.8 %
NEUTROPHILS # BLD AUTO: 5.8 10E9/L (ref 1.6–8.3)
NEUTROPHILS NFR BLD AUTO: 70.5 %
NRBC # BLD AUTO: 0 10*3/UL
NRBC BLD AUTO-RTO: 0 /100
PLATELET # BLD AUTO: 249 10E9/L (ref 150–450)
RBC # BLD AUTO: 4.39 10E12/L (ref 3.8–5.2)
RUPTURE OF FETAL MEMBRANES BY ROM PLUS: POSITIVE
SPECIMEN EXP DATE BLD: NORMAL
T PALLIDUM AB SER QL: NONREACTIVE
WBC # BLD AUTO: 8.2 10E9/L (ref 4–11)

## 2020-03-10 PROCEDURE — 86900 BLOOD TYPING SEROLOGIC ABO: CPT | Performed by: ADVANCED PRACTICE MIDWIFE

## 2020-03-10 PROCEDURE — 72200001 ZZH LABOR CARE VAGINAL DELIVERY SINGLE

## 2020-03-10 PROCEDURE — 12000001 ZZH R&B MED SURG/OB UMMC

## 2020-03-10 PROCEDURE — 85025 COMPLETE CBC W/AUTO DIFF WBC: CPT | Performed by: ADVANCED PRACTICE MIDWIFE

## 2020-03-10 PROCEDURE — 25000132 ZZH RX MED GY IP 250 OP 250 PS 637: Performed by: ADVANCED PRACTICE MIDWIFE

## 2020-03-10 PROCEDURE — 84112 EVAL AMNIOTIC FLUID PROTEIN: CPT | Performed by: ADVANCED PRACTICE MIDWIFE

## 2020-03-10 PROCEDURE — 25800030 ZZH RX IP 258 OP 636: Performed by: ADVANCED PRACTICE MIDWIFE

## 2020-03-10 PROCEDURE — 86901 BLOOD TYPING SEROLOGIC RH(D): CPT | Performed by: ADVANCED PRACTICE MIDWIFE

## 2020-03-10 PROCEDURE — 25000128 H RX IP 250 OP 636: Performed by: ADVANCED PRACTICE MIDWIFE

## 2020-03-10 PROCEDURE — 40000977 ZZH STATISTIC ATTENDANCE AT DELIVERY

## 2020-03-10 PROCEDURE — 86780 TREPONEMA PALLIDUM: CPT | Performed by: ADVANCED PRACTICE MIDWIFE

## 2020-03-10 PROCEDURE — G0463 HOSPITAL OUTPT CLINIC VISIT: HCPCS

## 2020-03-10 PROCEDURE — 00JU3ZZ INSPECTION OF SPINAL CANAL, PERCUTANEOUS APPROACH: ICD-10-PCS | Performed by: ANESTHESIOLOGY

## 2020-03-10 PROCEDURE — 25000125 ZZHC RX 250: Performed by: ADVANCED PRACTICE MIDWIFE

## 2020-03-10 PROCEDURE — 25000128 H RX IP 250 OP 636: Performed by: STUDENT IN AN ORGANIZED HEALTH CARE EDUCATION/TRAINING PROGRAM

## 2020-03-10 PROCEDURE — 0HQ9XZZ REPAIR PERINEUM SKIN, EXTERNAL APPROACH: ICD-10-PCS | Performed by: ADVANCED PRACTICE MIDWIFE

## 2020-03-10 PROCEDURE — 25000125 ZZHC RX 250: Performed by: ANESTHESIOLOGY

## 2020-03-10 PROCEDURE — 86850 RBC ANTIBODY SCREEN: CPT | Performed by: ADVANCED PRACTICE MIDWIFE

## 2020-03-10 RX ORDER — LIDOCAINE 40 MG/G
CREAM TOPICAL
Status: DISCONTINUED | OUTPATIENT
Start: 2020-03-10 | End: 2020-03-10

## 2020-03-10 RX ORDER — LANOLIN 100 %
OINTMENT (GRAM) TOPICAL
Status: DISCONTINUED | OUTPATIENT
Start: 2020-03-10 | End: 2020-03-12 | Stop reason: HOSPADM

## 2020-03-10 RX ORDER — OXYTOCIN/0.9 % SODIUM CHLORIDE 30/500 ML
1-24 PLASTIC BAG, INJECTION (ML) INTRAVENOUS CONTINUOUS
Status: DISCONTINUED | OUTPATIENT
Start: 2020-03-10 | End: 2020-03-10

## 2020-03-10 RX ORDER — OXYTOCIN/0.9 % SODIUM CHLORIDE 30/500 ML
100-340 PLASTIC BAG, INJECTION (ML) INTRAVENOUS CONTINUOUS PRN
Status: DISCONTINUED | OUTPATIENT
Start: 2020-03-10 | End: 2020-03-10

## 2020-03-10 RX ORDER — EPHEDRINE SULFATE 50 MG/ML
INJECTION, SOLUTION INTRAMUSCULAR; INTRAVENOUS; SUBCUTANEOUS
Status: DISCONTINUED
Start: 2020-03-10 | End: 2020-03-10 | Stop reason: HOSPADM

## 2020-03-10 RX ORDER — METHYLERGONOVINE MALEATE 0.2 MG/ML
200 INJECTION INTRAVENOUS
Status: COMPLETED | OUTPATIENT
Start: 2020-03-10 | End: 2020-03-10

## 2020-03-10 RX ORDER — PENICILLIN G POTASSIUM 5000000 [IU]/1
5 INJECTION, POWDER, FOR SOLUTION INTRAMUSCULAR; INTRAVENOUS ONCE
Status: COMPLETED | OUTPATIENT
Start: 2020-03-10 | End: 2020-03-10

## 2020-03-10 RX ORDER — AMOXICILLIN 250 MG
2 CAPSULE ORAL 2 TIMES DAILY
Status: DISCONTINUED | OUTPATIENT
Start: 2020-03-10 | End: 2020-03-12 | Stop reason: HOSPADM

## 2020-03-10 RX ORDER — OXYTOCIN 10 [USP'U]/ML
10 INJECTION, SOLUTION INTRAMUSCULAR; INTRAVENOUS
Status: DISCONTINUED | OUTPATIENT
Start: 2020-03-10 | End: 2020-03-10

## 2020-03-10 RX ORDER — NALOXONE HYDROCHLORIDE 0.4 MG/ML
.1-.4 INJECTION, SOLUTION INTRAMUSCULAR; INTRAVENOUS; SUBCUTANEOUS
Status: DISCONTINUED | OUTPATIENT
Start: 2020-03-10 | End: 2020-03-10

## 2020-03-10 RX ORDER — ACETAMINOPHEN 325 MG/1
650 TABLET ORAL EVERY 4 HOURS PRN
Status: DISCONTINUED | OUTPATIENT
Start: 2020-03-10 | End: 2020-03-10

## 2020-03-10 RX ORDER — MISOPROSTOL 200 UG/1
TABLET ORAL
Status: DISCONTINUED
Start: 2020-03-10 | End: 2020-03-10 | Stop reason: HOSPADM

## 2020-03-10 RX ORDER — LIDOCAINE HYDROCHLORIDE 10 MG/ML
INJECTION, SOLUTION EPIDURAL; INFILTRATION; INTRACAUDAL; PERINEURAL
Status: DISCONTINUED
Start: 2020-03-10 | End: 2020-03-10 | Stop reason: HOSPADM

## 2020-03-10 RX ORDER — OXYTOCIN 10 [USP'U]/ML
10 INJECTION, SOLUTION INTRAMUSCULAR; INTRAVENOUS
Status: DISCONTINUED | OUTPATIENT
Start: 2020-03-10 | End: 2020-03-12 | Stop reason: HOSPADM

## 2020-03-10 RX ORDER — BISACODYL 10 MG
10 SUPPOSITORY, RECTAL RECTAL DAILY PRN
Status: DISCONTINUED | OUTPATIENT
Start: 2020-03-12 | End: 2020-03-12 | Stop reason: HOSPADM

## 2020-03-10 RX ORDER — FENTANYL CITRATE 50 UG/ML
25 INJECTION, SOLUTION INTRAMUSCULAR; INTRAVENOUS ONCE
Status: DISCONTINUED | OUTPATIENT
Start: 2020-03-10 | End: 2020-03-10

## 2020-03-10 RX ORDER — FENTANYL/BUPIVACAINE/NS/PF 2-1250MCG
PLASTIC BAG, INJECTION (ML) INJECTION
Status: DISCONTINUED
Start: 2020-03-10 | End: 2020-03-10 | Stop reason: HOSPADM

## 2020-03-10 RX ORDER — ONDANSETRON 2 MG/ML
4 INJECTION INTRAMUSCULAR; INTRAVENOUS EVERY 6 HOURS PRN
Status: DISCONTINUED | OUTPATIENT
Start: 2020-03-10 | End: 2020-03-10

## 2020-03-10 RX ORDER — EPHEDRINE SULFATE 50 MG/ML
INJECTION, SOLUTION INTRAMUSCULAR; INTRAVENOUS; SUBCUTANEOUS
Status: DISCONTINUED
Start: 2020-03-10 | End: 2020-03-10 | Stop reason: WASHOUT

## 2020-03-10 RX ORDER — OXYTOCIN 10 [USP'U]/ML
INJECTION, SOLUTION INTRAMUSCULAR; INTRAVENOUS
Status: DISCONTINUED
Start: 2020-03-10 | End: 2020-03-10 | Stop reason: HOSPADM

## 2020-03-10 RX ORDER — IBUPROFEN 800 MG/1
800 TABLET, FILM COATED ORAL
Status: DISCONTINUED | OUTPATIENT
Start: 2020-03-10 | End: 2020-03-10

## 2020-03-10 RX ORDER — OXYCODONE HYDROCHLORIDE 5 MG/1
5 TABLET ORAL EVERY 6 HOURS PRN
Status: DISCONTINUED | OUTPATIENT
Start: 2020-03-10 | End: 2020-03-12 | Stop reason: HOSPADM

## 2020-03-10 RX ORDER — OXYTOCIN/0.9 % SODIUM CHLORIDE 30/500 ML
PLASTIC BAG, INJECTION (ML) INTRAVENOUS
Status: DISCONTINUED
Start: 2020-03-10 | End: 2020-03-10 | Stop reason: HOSPADM

## 2020-03-10 RX ORDER — EPHEDRINE SULFATE 50 MG/ML
5 INJECTION, SOLUTION INTRAMUSCULAR; INTRAVENOUS; SUBCUTANEOUS
Status: DISCONTINUED | OUTPATIENT
Start: 2020-03-10 | End: 2020-03-10

## 2020-03-10 RX ORDER — OXYTOCIN/0.9 % SODIUM CHLORIDE 30/500 ML
100 PLASTIC BAG, INJECTION (ML) INTRAVENOUS CONTINUOUS
Status: DISCONTINUED | OUTPATIENT
Start: 2020-03-10 | End: 2020-03-12 | Stop reason: HOSPADM

## 2020-03-10 RX ORDER — SODIUM CHLORIDE, SODIUM LACTATE, POTASSIUM CHLORIDE, CALCIUM CHLORIDE 600; 310; 30; 20 MG/100ML; MG/100ML; MG/100ML; MG/100ML
INJECTION, SOLUTION INTRAVENOUS CONTINUOUS
Status: DISCONTINUED | OUTPATIENT
Start: 2020-03-10 | End: 2020-03-10

## 2020-03-10 RX ORDER — CARBOPROST TROMETHAMINE 250 UG/ML
250 INJECTION, SOLUTION INTRAMUSCULAR
Status: DISCONTINUED | OUTPATIENT
Start: 2020-03-10 | End: 2020-03-10

## 2020-03-10 RX ORDER — OXYCODONE AND ACETAMINOPHEN 5; 325 MG/1; MG/1
1 TABLET ORAL
Status: DISCONTINUED | OUTPATIENT
Start: 2020-03-10 | End: 2020-03-10

## 2020-03-10 RX ORDER — ACETAMINOPHEN 325 MG/1
650 TABLET ORAL EVERY 4 HOURS PRN
Status: DISCONTINUED | OUTPATIENT
Start: 2020-03-10 | End: 2020-03-12 | Stop reason: HOSPADM

## 2020-03-10 RX ORDER — FENTANYL CITRATE 50 UG/ML
50-100 INJECTION, SOLUTION INTRAMUSCULAR; INTRAVENOUS
Status: DISCONTINUED | OUTPATIENT
Start: 2020-03-10 | End: 2020-03-10

## 2020-03-10 RX ORDER — IBUPROFEN 800 MG/1
800 TABLET, FILM COATED ORAL EVERY 6 HOURS PRN
Status: DISCONTINUED | OUTPATIENT
Start: 2020-03-10 | End: 2020-03-12 | Stop reason: HOSPADM

## 2020-03-10 RX ORDER — NALOXONE HYDROCHLORIDE 0.4 MG/ML
.1-.4 INJECTION, SOLUTION INTRAMUSCULAR; INTRAVENOUS; SUBCUTANEOUS
Status: DISCONTINUED | OUTPATIENT
Start: 2020-03-10 | End: 2020-03-12 | Stop reason: HOSPADM

## 2020-03-10 RX ORDER — AMOXICILLIN 250 MG
1 CAPSULE ORAL 2 TIMES DAILY
Status: DISCONTINUED | OUTPATIENT
Start: 2020-03-10 | End: 2020-03-12 | Stop reason: HOSPADM

## 2020-03-10 RX ORDER — OXYTOCIN/0.9 % SODIUM CHLORIDE 30/500 ML
340 PLASTIC BAG, INJECTION (ML) INTRAVENOUS CONTINUOUS PRN
Status: DISCONTINUED | OUTPATIENT
Start: 2020-03-10 | End: 2020-03-12 | Stop reason: HOSPADM

## 2020-03-10 RX ORDER — NALBUPHINE HYDROCHLORIDE 10 MG/ML
2.5-5 INJECTION, SOLUTION INTRAMUSCULAR; INTRAVENOUS; SUBCUTANEOUS EVERY 6 HOURS PRN
Status: DISCONTINUED | OUTPATIENT
Start: 2020-03-10 | End: 2020-03-10

## 2020-03-10 RX ORDER — HYDROCORTISONE 2.5 %
CREAM (GRAM) TOPICAL 3 TIMES DAILY PRN
Status: DISCONTINUED | OUTPATIENT
Start: 2020-03-10 | End: 2020-03-12 | Stop reason: HOSPADM

## 2020-03-10 RX ADMIN — ACETAMINOPHEN 650 MG: 325 TABLET, FILM COATED ORAL at 23:26

## 2020-03-10 RX ADMIN — Medication 2 ML: at 13:22

## 2020-03-10 RX ADMIN — Medication 2.5 MILLION UNITS: at 18:28

## 2020-03-10 RX ADMIN — IBUPROFEN 800 MG: 800 TABLET, FILM COATED ORAL at 20:29

## 2020-03-10 RX ADMIN — Medication 2.5 MILLION UNITS: at 10:33

## 2020-03-10 RX ADMIN — METHYLERGONOVINE MALEATE 200 MCG: 0.2 INJECTION INTRAVENOUS at 19:17

## 2020-03-10 RX ADMIN — Medication 10 ML/HR: at 13:30

## 2020-03-10 RX ADMIN — Medication 8 ML: at 13:26

## 2020-03-10 RX ADMIN — Medication 2.5 MILLION UNITS: at 15:00

## 2020-03-10 RX ADMIN — SODIUM CHLORIDE, POTASSIUM CHLORIDE, SODIUM LACTATE AND CALCIUM CHLORIDE 1000 ML: 600; 310; 30; 20 INJECTION, SOLUTION INTRAVENOUS at 10:19

## 2020-03-10 RX ADMIN — PENICILLIN G POTASSIUM 5 MILLION UNITS: 5000000 POWDER, FOR SOLUTION INTRAMUSCULAR; INTRAPLEURAL; INTRATHECAL; INTRAVENOUS at 02:37

## 2020-03-10 RX ADMIN — Medication 2.5 MILLION UNITS: at 06:25

## 2020-03-10 RX ADMIN — OXYTOCIN-SODIUM CHLORIDE 0.9% IV SOLN 30 UNIT/500ML 100 ML/HR: 30-0.9/5 SOLUTION at 22:30

## 2020-03-10 RX ADMIN — SODIUM CHLORIDE, POTASSIUM CHLORIDE, SODIUM LACTATE AND CALCIUM CHLORIDE: 600; 310; 30; 20 INJECTION, SOLUTION INTRAVENOUS at 02:38

## 2020-03-10 RX ADMIN — Medication 2 MILLI-UNITS/MIN: at 10:45

## 2020-03-10 RX ADMIN — Medication 2 ML: at 13:16

## 2020-03-10 ASSESSMENT — ACTIVITIES OF DAILY LIVING (ADL)
BATHING: 0-->INDEPENDENT
COGNITION: 0 - NO COGNITION ISSUES REPORTED
DRESS: 0-->INDEPENDENT
RETIRED_COMMUNICATION: 0-->UNDERSTANDS/COMMUNICATES WITHOUT DIFFICULTY
SWALLOWING: 0-->SWALLOWS FOODS/LIQUIDS WITHOUT DIFFICULTY
AMBULATION: 0-->INDEPENDENT
RETIRED_EATING: 0-->INDEPENDENT
TRANSFERRING: 0-->INDEPENDENT
TOILETING: 0-->INDEPENDENT

## 2020-03-10 NOTE — PLAN OF CARE
Pt doing well and was waiting for  to come after dropping kids at school. Surprised to be 7 cm and not yet ready for epidural and unsure as to when that will be. Breathing well through ctx, comfortable between. Pos changes, birth ball. C/o lower back pain like other labors and hot packs helping. Offer tub, enc pos changes, snacks, hydration. Exp mgt.
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
## 2020-03-10 NOTE — PROGRESS NOTES
Labor progress note    S: Enid reports feeling short of breath and shaky. She remembers feeling similar with her last labor and delivery shortly before baby was born. She is agreeable to O2, position change and SVE.    O:  Blood pressure 119/65, pulse 93, temperature 99.2  F (37.3  C), temperature source Oral, resp. rate 18, last menstrual period 2019, SpO2 96 %, not currently breastfeeding.  General appearance: comfortable.  Contractions: Every 2-3 minutes. 60-80 seconds duration.  Palpate: moderate.  Soft resting tone.   FHT: Baseline 140 with moderate variability. Accelerations present.  decelerations present.  ROM: clear fluid. Membranes have been ruptured for 15 hours.  PELVIC EXAM:10/ 100%/ -1     Pitocin- 2 mu/min.,  Antibiotics- PCN    # Pain Assessment:    Enid s pain level was assessed and she currently denies pain.  Has labor epidural    A:  34 year old  with IUP @ 40w5d second stage labor   Normotensive, pulse and O2 saturation WNL  Fetal Heart Rate Tracing category two, early decel without recovery to baseline before next contraction = 3 minute ricky 90s  GBS- positive- adequately treated    Patient Active Problem List   Diagnosis     Vestibular migraine     Syrinx of spinal cord (H)     Family history of deafness and hearing loss     History of abnormal cervical Pap smear     Anxiety managed      Family history of cleft palate     Supervision of other normal pregnancy     GBS (group B streptococcus) UTI complicating pregnancy     Pregnancy complication     Encounter for triage in pregnant patient     Labor and delivery indication for care or intervention       P:    Frequent position changes to facilitate labor with epidural anesthesia.  Labor down, pt not feeling any pressure or urge to push  Observation and reevaluate in 1 hour prn  Anticipate        I, JACKIE Mg, am serving as a scribe to document services personally performed by Earline Velasco CNM based on the provider's  statements to me.- JACKIE Mg  I was present with the Midwife student who participated in the service and in the documentation of the services provided. I have verified the history and personally performed the physical exam and medical decision making,  as documented by the student and edited by me. NIRAV Napoles CNM

## 2020-03-10 NOTE — ANESTHESIA PROCEDURE NOTES
Combined Spinal/Epidural procedure note    Staff  Anesthesiologist:  Earline Harris MD  Location:  OB  Procedure start time:  3/10/2020 1:01 PM  Procedure end time:  3/10/2020 1:26 PM    Timeout  patient identified, IV checked, site marked, risks and benefits discussed, informed consent, monitors and equipment checked, pre-op evaluation, at physician/surgeon's request and post-op pain management    Correct Patient: Yes    Correct Position: Yes    Correct Site: Yes    Correct Procedure: Yes    Correct Laterality:  Yes  Site Marked:  Yes    Procedure details  Procedure:  Combined Spinal/Epidural (CSE)  Position:  Sitting  ASA:  2 and Emergent  Diagnosis:  Labor  Sterile Prep: chloraprep, patient draped, mask and sterile gloves    Local skin infiltration:  1% lidocaine  amount (mL):  5  Approach:  Left paramedian  Epidural Needle Type:  So  Needle gauge (G):  17  Needle length (in):  3.5  Injection Technique:  LORT saline  ANTHONY at (cm):  6  Attempts:  2  Redirects:  2  Spinal Needle (G):  27  Spinal Needle Type:  Pencan  Spinal Needle Length (in):  4 11/16  Catheter gauge (G):  19  Catheter threaded easily: Yes    Threaded to skin (cm) :  11  Threaded in epidural space (cm):  5  Paresthesias:  No  CSF with Epidural needle: No    CSF with Spinal needle: Yes    Aspiration negative for Heme or CSF: Yes     Initial attempt midline at L3-4 unsuccessful, osseous only. Second attempt paramedian at L L3-4 successful.

## 2020-03-10 NOTE — PROGRESS NOTES
Labor progress note    S: Enid reports a gush of clear fluid while sitting on the birthing ball. She reports her contraction pain as being primarily in her back. She is interested in sterile water papules to help reduce her sensation of the back pain.  She is also interested in SVE.    O:  Blood pressure 106/69, pulse 86, temperature 97.8  F (36.6  C), temperature source Oral, resp. rate 18, last menstrual period 2019, not currently breastfeeding.  General appearance: uncomfortable with contractions.  Contractions: Every 2-4 minutes. 60 seconds duration.  Palpate: moderate.  Soft resting tone.   FHT: Baseline 145 with moderate variability. Accelerations present. no decelerations present.  ROM: clear fluid. Membranes have been ruptured for 11.5 hours.  PELVIC EXAM:8.5/ 90%/ Mid/ soft/ -2     Pitocin- 2 mu/min.,  Antibiotics- PCN    # Pain Assessment:    - Enid is experiencing pain due to uterine contractions. Pain management was discussed and the plan was created in a collaborative fashion.  Enid's response to the current recommendations: engaged  - Enid is interested in sterile water papules, movement and emotional support for her pain    A:  34 year old  with IUP @ 40w5d active labor   Pitocin augmentation  Fetal Heart Rate Tracing category one  GBS- positive- adequately treated    Patient Active Problem List   Diagnosis     Vestibular migraine     Syrinx of spinal cord (H)     Family history of deafness and hearing loss     History of abnormal cervical Pap smear     Anxiety managed      Family history of cleft palate     Supervision of other normal pregnancy     GBS (group B streptococcus) UTI complicating pregnancy     Pregnancy complication     Encounter for triage in pregnant patient     Labor and delivery indication for care or intervention       P:  Sterile water papules for back labor  Ambulation, hydration, position changes, birthing ball/sling and tub options to facilitate labor.  Rebozo done  while patient standing at bedside between three contractions.   Continue labor augmentation with Pitocin   Anticipate     I, JACKIE Mg, am serving as a scribe to document services personally performed by CNM based on the provider's statements to me.- JACKIE Mg    I was present with the Midwife student who participated in the service and in the documentation of the services  provided. I have verified the history and personally performed the physical exam and medical decision making,as documented by the student and edited by me. NIRAV Napoles CNM    Addendum 1345:  Patient requested to have anesthesia give another attempt at epidural placement.  Anesthesia in room, patient now comfortable and happy with epidural placement.  NIRAV Napoles CNM
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
## 2020-03-10 NOTE — PROGRESS NOTES
Labor progress note    S: Enid is sitting on the birthing ball, bedside. Diogenes, spouse, is bedside and supportive. She is requesting a SVE to see if she's made any change. She reports that contractions are painful when they happen but that they have spaced out a little.    O:  Blood pressure 109/65, pulse 90, temperature 98.9  F (37.2  C), temperature source Oral, resp. rate 18, last menstrual period 2019, not currently breastfeeding.  General appearance: comfortable.  Contractions: Every 3-5 minutes. 60 seconds duration.  Soft resting tone.   FHT: Baseline 135 with moderate variability. Accelerations present. no decelerations present.  ROM: clear fluid. Membranes have been ruptured for 9 hours.  PELVIC EXAM:7/ 80%/ Mid/ soft/ -2     Pitocin- none,  Antibiotics- PCN    # Pain Assessment:    - Enid is experiencing pain due to uterine contractions. Pain management was discussed and the plan was created in a collaborative fashion.  Enid's response to the current recommendations: engaged  - pt requesting an epidural           A:  34 year old  with IUP @ 40w5d active labor     Fetal Heart Rate Tracing category one  GBS- positive- adequately treated    Patient Active Problem List   Diagnosis     Vestibular migraine     Syrinx of spinal cord (H)     Family history of deafness and hearing loss     History of abnormal cervical Pap smear     Anxiety managed      Family history of cleft palate     Supervision of other normal pregnancy     GBS (group B streptococcus) UTI complicating pregnancy     Pregnancy complication     Encounter for triage in pregnant patient     Labor and delivery indication for care or intervention         P:  Discussed no cervical change since 0600, R/B/A of augmentation vs expectant management until 6 hrs  Labor augmentation with Pitocin reviewed with pt. Agreeable to plan.   Pain medication options reviewed with pt. Pt is interested in epidural before starting augmentation  Frequent  position changes to facilitate labor with epidural anesthesia  Reevaluate in 2-4 hours prn    I, JACKIE Mg, am serving as a scribe to document services personally performed by CNM based on the provider's statements to me.- JACKIE Mg    I was present with the Midwife student who participated in the service and in the documentation of the services  provided. I have verified the history and personally performed the physical exam and medical decision making, as documented by the student and edited by me. NIRAV Napoles CNM

## 2020-03-10 NOTE — PLAN OF CARE
Pt comfortable. Tried pushing with limited results until pt lost feeling of pressure and urge to push. Using peanut ball and pos changes to enc fetal rotation and descent.  had to leave to  children.

## 2020-03-10 NOTE — PLAN OF CARE
Pt no longer able to maurice labor and requested epidural. Pt now resting comfortably and hopes to nap.  at bedside providing supportive cares.

## 2020-03-10 NOTE — PROGRESS NOTES
Blood pressure 109/65, pulse 90, temperature 97.9  F (36.6  C), resp. rate 18, last menstrual period 05/30/2019, not currently breastfeeding.  Patient Vitals for the past 24 hrs:   BP Temp Temp src Pulse Resp   03/10/20 0545 -- 97.9  F (36.6  C) -- -- 18   03/10/20 0440 109/65 98.1  F (36.7  C) Oral 90 18   03/10/20 0242 108/68 98.7  F (37.1  C) Oral -- 18     General appearance: Sitting up in bed. States contractions are much stronger but that there is relief in between. Laughing and talking in between contractions. Waiting for  to arrive- dropping off kids now, should be at hospital at 7:30.   CONTRACTIONS: moderate and every 4-6 minutes  Pitocin- none,  Antibiotics- PCN #1 @ 0237  FETAL HEART TONES: continuous EFM- baseline 135 with moderate variability and positive accelerations. No decelerations.  ROM: clear fluid, rom plus positive  PELVIC EXAM:6-7/90/-3, forebag palpated- soft, head high above bow    # Pain Assessment:  Current Pain Score 3/10/2020   Patient currently in pain? yes   Pain location -   Pain descriptors -   - Enid is experiencing pain due to contractions. Pain management was discussed with Enid and the plan was created in a collaborative fashion.  Enid's response to the current recommendations: engaged  - desires epidural but not at this time    ASSESSMENT:  ==============  IUP @ 40w5d, active labor   Fetal Heart Rate Tracing category one  GBS- positive- antibiotics started- 0237    2nd dose PCN @ 0637    SVE 6-7/90/-3    SROM 0030, clear fluid, afebrile    Patient Active Problem List   Diagnosis     Vestibular migraine     Syrinx of spinal cord (H)     Family history of deafness and hearing loss     History of abnormal cervical Pap smear     Anxiety managed      Family history of cleft palate     Supervision of other normal pregnancy     GBS (group B streptococcus) UTI complicating pregnancy     Pregnancy complication     Encounter for triage in pregnant patient     Labor and delivery  indication for care or intervention     PLAN:  ===========  Labor support provided. Encouraged position changes.  Pt waiting for  to arrive - approximately 7:30.   2nd dose of PCN to be hung at 0637.   Monitor fluid color, maternal temperature, FHT, s/s triple I.  Reevaluate prn per maternal/fetal indications.     NIRAV Boggs, ANDRAM
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
## 2020-03-10 NOTE — PLAN OF CARE
"CNM discussed pit aug and epid with pt. Pt amenable to pit after epid placed. MD attempted epid--see MD notes--pt requested they stop as \"this is more painful than contractions\". Pt 7-8 cm dilated and focusing on \"only 2 cm\" to go and mental and physical plan to endure labor progress. No pain between contractions and doing well breathing well through contractions.  at bedside providing supportive cares.  "

## 2020-03-10 NOTE — ANESTHESIA PROCEDURE NOTES
Epidural Procedure Note    Staff:     Anesthesiologist:  Earline Harris MD    Resident/CRNA:  René Moreno DO    Procedure performed by resident/CRNA in the presence of a teaching physician    Location: OB     Procedure start time:  3/10/2020 9:40 AM     Procedure end time:  3/10/2020 10:10 AM   Pre-procedure checklist:   patient identified, IV checked, site marked, risks and benefits discussed, informed consent, monitors and equipment checked, pre-op evaluation, at physician/surgeon's request and post-op pain management      Correct Patient: Yes      Correct Position: Yes      Correct Site: Yes      Correct Procedure: Yes      Correct Laterality:  Yes    Site Marked:  Yes  Procedure:     Procedure:  Epidural catheter    ASA:  2 and Emergent    Diagnosis:  Labor    Position:  Sitting    Sterile Prep: chloraprep, mask, sterile gloves and patient draped      Insertion site:  L3-4 and L4-5    Local skin infiltration:  1% lidocaine    amount (mL):  10    Needle gauge (G):  17    Needle Length (in):  3.5    Block Needle Type:  Touhy    Injection Technique:  LORT saline    Attempts:  3    Redirects:  3  Assessment/Narrative:      Multiple attempts at L3-4 and L4-5 midline and L3-4 L paramedian unsuccessful, osseous only. Pt. Position suboptimal. First two attempts by resident, third and fourth attempts by faculty. Asked pt. If she would like us to continue. Pt. Expressed she was not sure labor pains were as bad as the epidural attempts. Pt. Asked us to abort. Apologized to patient for inability to complete procedure.

## 2020-03-11 LAB — HGB BLD-MCNC: 10.3 G/DL (ref 11.7–15.7)

## 2020-03-11 PROCEDURE — 36415 COLL VENOUS BLD VENIPUNCTURE: CPT | Performed by: ADVANCED PRACTICE MIDWIFE

## 2020-03-11 PROCEDURE — 25000132 ZZH RX MED GY IP 250 OP 250 PS 637: Performed by: ADVANCED PRACTICE MIDWIFE

## 2020-03-11 PROCEDURE — 85018 HEMOGLOBIN: CPT | Performed by: ADVANCED PRACTICE MIDWIFE

## 2020-03-11 PROCEDURE — 12000001 ZZH R&B MED SURG/OB UMMC

## 2020-03-11 RX ORDER — IBUPROFEN 600 MG/1
600 TABLET, FILM COATED ORAL EVERY 6 HOURS PRN
Qty: 60 TABLET | Refills: 0 | Status: SHIPPED | OUTPATIENT
Start: 2020-03-11 | End: 2021-03-24

## 2020-03-11 RX ORDER — ACETAMINOPHEN 325 MG/1
650 TABLET ORAL EVERY 6 HOURS PRN
Qty: 100 TABLET | Refills: 0 | Status: SHIPPED | OUTPATIENT
Start: 2020-03-11 | End: 2020-10-30

## 2020-03-11 RX ORDER — FERROUS SULFATE 325(65) MG
325 TABLET ORAL
Qty: 30 TABLET | Refills: 1 | Status: SHIPPED | OUTPATIENT
Start: 2020-03-11 | End: 2020-10-30

## 2020-03-11 RX ORDER — AMOXICILLIN 250 MG
1 CAPSULE ORAL DAILY
Qty: 100 TABLET | Refills: 0 | Status: SHIPPED | OUTPATIENT
Start: 2020-03-11 | End: 2020-10-30

## 2020-03-11 RX ORDER — MAGNESIUM 200 MG
1000 TABLET ORAL DAILY
Qty: 30 TABLET | Refills: 1 | Status: SHIPPED | OUTPATIENT
Start: 2020-03-11 | End: 2020-10-30

## 2020-03-11 RX ADMIN — ACETAMINOPHEN 650 MG: 325 TABLET, FILM COATED ORAL at 04:40

## 2020-03-11 RX ADMIN — SENNOSIDES AND DOCUSATE SODIUM 2 TABLET: 8.6; 5 TABLET ORAL at 21:00

## 2020-03-11 RX ADMIN — IBUPROFEN 800 MG: 800 TABLET, FILM COATED ORAL at 18:37

## 2020-03-11 RX ADMIN — ACETAMINOPHEN 650 MG: 325 TABLET, FILM COATED ORAL at 21:00

## 2020-03-11 RX ADMIN — IBUPROFEN 800 MG: 800 TABLET, FILM COATED ORAL at 12:36

## 2020-03-11 RX ADMIN — HYDROCORTISONE: 25 CREAM TOPICAL at 12:52

## 2020-03-11 RX ADMIN — ACETAMINOPHEN 650 MG: 325 TABLET, FILM COATED ORAL at 16:36

## 2020-03-11 RX ADMIN — ACETAMINOPHEN 650 MG: 325 TABLET, FILM COATED ORAL at 08:31

## 2020-03-11 RX ADMIN — IBUPROFEN 800 MG: 800 TABLET, FILM COATED ORAL at 04:40

## 2020-03-11 RX ADMIN — SENNOSIDES AND DOCUSATE SODIUM 2 TABLET: 8.6; 5 TABLET ORAL at 07:57

## 2020-03-11 NOTE — PLAN OF CARE
VSS. Postpartum assessment WNL with exception of left labial swelling unchanged.   Continue to monitor pts back; pt had an epidural placed multiple times and reports back soreness. Declines HA discussed s/s of spinal HA with patient.    Heat applied to back with use of aqua K pad. Ice applied to perineum with tucks pads.   Pain managed with tylenol and IB.   Encouraged sitz baths. Pt soaked in tub x1 overnight.   Breastfeeding with assistance LC consult released for tight frenulum.   Mother hand expressing and getting drops of colostrum.   Voiding w/o difficulty.  Bonding well with .

## 2020-03-11 NOTE — L&D DELIVERY NOTE
Delivery Summary    Enid Mcbride MRN# [id number]   Age: 34 year old YOB: 1985     DELIVERY NOTE:  Labor Course: Enid Mcbride is a 34 year old   at  40w5d presented to labor floor with c/o SROM at approximately 0030. Started to feel contractions were spacing out but getting more uncomfortable.  GBS+ prophylaxis with  PCN  Started on admission.;   0149- sve 5/80/-3, mid/soft SVE.  Pt requested SVE and was found to be 7/80/-2 ~0910 and requested epidural - had failed attempt by anesthesia and proceeded without using non pharmacologic interventions including sterile water papules for back pain. Pitocin started 1030 for dysfunctional contraction pattern with no cervical change. Pt requested reassessment of SVE ans was found to be 9/100/-3 at 1200, requested again epidural and received adequate relief at 1300. Pt napped, felt intermittent rectal pressure starting at 1400.    Delivery Course:  Pt progressed to complete dilation at 1536 with limited rectal pressure and fetal station -1 with asynclitic presentation. Used peanut ball and positioning to encourage fetal rotation and descent after risk/benefit discussion about passive descent with pt.  Pt had more urge and began active second stage at 1700.  Used tug of war positioning and then side lying while pushing.  +variable decelerations noted with pushing effort, moderate variability throughout.   Pushed effectively with CNM coaching. NICU called with  for light meconium fluid. Head delivered RENETTA and restituted to ROT. No nuchal cord. Shoulders easily delivered under maternal effort in the transverse plane .  Live male  delivered at 1902 over a perineal abrasion under epidural anesthesia.  Spontaneous breath, vigorous cry, well flexed, HR>100. Infant directly to maternal abdomen, skin to skin. NICU left room before 1 minute apgar and resource RN took over acres. Delayed cord clamping until pulsations ceased then clamped x2 and cut  "by patient.  IV pitocin infusing via postpartum protocol after baby.  Cord blood obtained for typing.  Cord segment for gases. Intact placenta spontaneously delivered via Foley at 1910.   3 vessel cord. Fundus firm @ U+1 after massage but brisk vaginal bleeding noted.  Sweep of vagina expressed no clots but urine noted with fundal massage so ST was performed for 300ml clear, yellow urine.  IV displaced but was retaped by RN team during that time.  IM Methergine given with pt consent.  2 small clots expressed with fundal massage after ST the bleeding stabilized - small lochia noted.  Vagina, and rectum inspected,  found to be intact.  Small 1st degree L perineal laceration noted oozing despite direct pressure and was repaired with 3-0 suture on a CT with 1 figure of eight suture under epidural anesthesia.  Hemostasis noted.    Mother and infant stable, continued skin to skin.    Apgars 9/9.  Weight pending QBL 553ml         IUP at 40 weeks gestation delivered on March 10, 2020.     delivery of a viable Male infant.  Weight : pending  Apgars of 9 at 1 minute and 9 at 5 minutes.  Labor was spontaneous.  Medications administered  in labor:  Pain Rx Epidural and sterile H2O injection ; Antibiotics Yes: PCN, adequately treated  Perineum: 1st degree  Placenta-mechanism: spontaneous, intact,  with a 3 vessel cord. IV oxytocin was given.  ST performed.  IM Methergine  QBL was 533ml.  Anticipated Discharge Date: 20  Complications of pregnancy, labor and delivery: Dysfunctional labor, Postpartum Hemorrhage  Birth attendants: NIRAV Koch,CORINNA, JACKIE Mg    I, JACKIE, am serving as a scribe to document services personally performed by CNM based on the provider's statements to me.\" - JACKIE Mg    The encounter was performed by me and scribed by the SNM. The scribed note accurately reflects my personal services and decisions made by me.  NIRAV Acosta CNM          ASSESSMENT & PLAN:    "   Labor Event Times    Labor onset date:  3/10/20 Onset time:   2:00 AM   Dilation complete date:  3/10/20 Complete time:   3:36 PM   Start pushing date/time:  3/10/2020 1700      Labor Length    1st Stage (hrs):  13 (min):  36   2nd Stage (hrs):  3 (min):  26   3rd Stage (hrs):  0 (min):  8      Labor Events     labor?:  No   steroids:  None  Labor Type:  Augmentation     Antibiotics received during labor?:  Yes  Reason for Antibiotics:  GBS  Antibiotics received for GBS:  Penicillin     Rupture date/time: 3/10/20 0030   Rupture type:  Spontaneous rupture of membranes occuring during spontaneous labor or augmentation  Fluid color:  Clear, Meconium, Pink  Fluid odor:  Normal     Augmentation:  Oxytocin  Augmentation date/time:  3/10/20    Indications for augmentation:  Ineffective Contraction Pattern  1:1 continuous labor support provided by?:  none Labor partogram used?:  no      Delivery/Placenta Date and Time    Delivery Date:  3/10/20 Delivery Time:   7:02 PM   Placenta Date/Time:  3/10/2020  7:10 PM  Oxytocin given at the time of delivery:  after delivery of baby     Vaginal Counts     Initial count performed by 2 team members:   Two Team Members   Kaylynn Palacio RN       [place] [employer]uture [place] [employer]ong[employer]   Initial counts 2 0 5    Added to count  1     Final counts 2 1 5    Placed during labor Accounted for at the end of labor   NA NA   NA NA   NA NA    Final count performed by 2 team members:   Two Team Members   CORINNA Koch RN      Final count correct?:  Yes     Apgars    Living status:  Living   1 Minute 5 Minute 10 Minute 15 Minute 20 Minute   Skin color: 0  1       Heart rate: 2  2       Reflex irritability: 2  2       Muscle tone: 2  2       Respiratory effort: 2  2       Total: 8  9          Cord    Vessels:  3 Vessels Complications:  None   Cord Blood Disposition:  Lab Gases Sent?:  No      [place] Resuscitation    [place] Care at  Delivery:  NICU team called to delivery due to light meconium stained fluid. Upon delivery, infant was vigorous with adequate respiratory effort. Determined that NICU team was not required.     Cecilia Ramirez PA-C 3/10/2020 7:57 PM     Skin to Skin and Feeding Plan    Skin to skin initiation date/time: 1/3/1841    Skin to skin with:  Mother  Skin to skin end date/time:        Labor Events and Shoulder Dystocia    Fetal Tracing Prior to Delivery:  Category 2  Fetal Tracing Comments:  Moderate variability  Shoulder dystocia present?:  Neg     Delivery (Maternal) (Provider to Complete) ([id number])    Episiotomy:  None  Perineal lacerations:  1st Repaired?:  Yes      Blood Loss  Mother: Enid Mcbride #[id number]   Start of Mother's Information    IO Blood Loss  03/10/20 0200 - 03/10/20 2050    Delivery QBL (mL) Hospital Encounter 533 mL    Total  533 mL         End of Mother's Information  Mother: Enid Mcbride #[id number]         Delivery - Provider to Complete ([id number])    Delivering clinician:  Kaylynn Moser APRN CNM  CNARNOLDO Care:  Exclusive CNM care in labor  Attempted Delivery Types (Choose all that apply):  Spontaneous Vaginal Delivery  Delivery Type (Choose the 1 that will go to the Birth History):  Vaginal, Spontaneous   Other personnel:   Provider Role   Opal Durand         Placenta    Delayed Cord Clamping:  Done  Date/Time:  3/10/2020  7:10 PM  Removal:  Spontaneous  Comments:  Og, 3 VC.    Disposition:  Hospital disposal     Anesthesia    Method:  Epidural          Presentation and Position    Presentation:  Vertex  Position:  Right Occiput Anterior         I, JACKIE Mg, am serving as a scribe to document services personally performed by Kaylynn Moser CNM based on the provider's statements to me.- JACKIE Mg

## 2020-03-11 NOTE — LACTATION NOTE
This note was copied from a baby's chart.  Consult for: Parent request- history of daughter with cleft palate and son with tongue tie.    Delivery Information: Infant was born at 40.5 weeks via vaginal delivery on 3/10/20 at 1902. Enid's delivery QBL was 619ml.    Maternal History: Enid has a history of anxiety and vitamin D deficiency. Her daughter had a cleft palate and her son was diagnosed (late per parents) with a tongue tie. Parents share frustration with feeding challenges with son and seeing multiple lactation consultants and being readmitted for failure to thrive before finally being diagnosed with tongue tie by ENT. At time of diagnosis, their son was too old for release in clinic and they chose to exclusively bottle feed.?    Maternal Breast Exam: Enid noted breast growth in pregnancy. Her breasts are soft and symmetrical with bilateral intact nipples. She feels she is able to express colostrum easily.?    Infant information: Infant has age appropriate output. He was suctioned by NICU NNP x 1 last night due to spitting up clear fluid. He has spit up a few times today, clear fluid and colostrum, but parents feel this is resolving. His birthweight was 8lb 3.6 oz and he is <24 hours of age. ?    Oral exam of baby:  Infant with mildly recessed chin. Palpable, shortened lingual frenulum present. Unable to assess suck as infant asleep and disinterested. Mother is able to get a comfortable latch.    Feeding Assessment: Enid had just finished a feeding and infant asleep at time of consult. Enid shared that this experience has been very different than the feeding experiences of her first to children and she is happy about that. Infant had been spitting up frequently overnight (clear fluid and colostrum) but that has resolved. She finds it easy to position and latch infant and denies discomfort when breastfeeding.     She was given LC phone number and encouraged to call for latch assessment/support with next  feeding.     Education: early feeding cues, benefits of feeding on cue, breastfeeding positions, signs breastfeeding is going well (comfortable latch, age appropriate output and weight loss, swallowing heard at the breast), satiety cues, expected  output,  weight loss, nutritive vs non-nutritive sucking, benefits of skin to skin, the Second Night, benefits of breast massage and hand expression of colostrum,potential impact of short lingual frenulum on feeding and when to follow up, inpatient lactation support and outpatient lactation resources.      Plan: Continue breastfeeding on cue with RN support as needed with a goal of 8-12 feedings per day. Encourage frequent skin to skin, breast massage and hand expression.     Enid will notify RN if she has pain and/or nipple damage.    Family plans to follow up at [facility]. Due to history of feeding challenges they were encouraged to follow up with an outpatient LC with any feeding concerns post discharge.They were given the  Breastfeeding Resource List, but may choose to follow up with Neyda Ramos MD, IBCLC at [facility] in [place] due to previous history with [facility] and history of frustrating LC experiences.

## 2020-03-11 NOTE — PROGRESS NOTES
Patient arrived to [facility] unit via wheelchair at 2125,with belongings, accompanied by spouse/ significant other, with infant in arms. Received report from Kimberley WEBB  and checked bands. Unit and room orientation completd. Call light within arms reach; continue to monitor posterior back site from multiple epidural attempts and monitor for spinal headache. Also monitor lochia; 2nd bag of pit infusing at 100mL/hr and notable left labial swelling. Continue with plan of care.

## 2020-03-11 NOTE — PLAN OF CARE
"[id number]  VSS. Postpartum assessment WNL. Perineal swelling treated with ice packs, tucks, and sitz baths. Left labial swelling area is less prominent and more generalized swelling this morning equally on bilateral labia; soft to palpitation and no \"ball\" appearance.   Small hemrhoid present treated with hydrocortisone cream and tucks pads.   Pain managed with tylenol and IB.   Breastfeeding with assist. LC consult pending.   Bonding well with mother and father.   "

## 2020-03-11 NOTE — PROGRESS NOTES
PostPartum Note  Postpartum Day #1  SIGNIFICANT PROBLEMS:  Patient Active Problem List    Diagnosis Date Noted     Encounter for triage in pregnant patient 03/10/2020     Priority: Medium     Labor and delivery indication for care or intervention 03/10/2020     Priority: Medium     Vaginal delivery 03/10/2020     Priority: Medium     Pregnancy complication 02/01/2020     Priority: Medium     Supervision of other normal pregnancy 07/26/2019     Priority: Medium     Level 2 ultrasound 10/8/19       GBS (group B streptococcus) UTI complicating pregnancy 07/05/2019     Priority: Medium     7/2/2019 found in NOB urine, treated  7/26/19: UC negative.       Family history of cleft palate 05/10/2018     Priority: Medium     daughter with cleft lip         Anxiety managed  05/03/2018     Priority: Medium     No meds        Family history of deafness and hearing loss 03/02/2018     Priority: Medium     Both parents are deaf, non-genetic cause        History of abnormal cervical Pap smear 03/02/2018     Priority: Medium     History of CIN1   3/2015: NILM, HPV neg   2/2016: NILM        Syrinx of spinal cord (H) 05/31/2017     Priority: Medium     Vestibular migraine 04/05/2017     Priority: Medium     INTERVAL HISTORY:  BP 94/65   Pulse 88   Temp 98.1  F (36.7  C) (Oral)   Resp 16   LMP 05/30/2019   SpO2 97%   Breastfeeding Unknown   Pt stable, baby is rooming in.   Breast feeding status: initiated and well established  Complications since 2 hours post delivery: pain in her back after multiple epidural placement attempts. Pain in the area of her hemorrhoid.    Patient is tolerating activity well. Voiding without difficulty, no BM. Cramping is relieved by Ibuprophen and is relieved by ice pack, lochia is decreasing and patient denies clots.  Perineal pain is is relieved by Ibuprophen and is relieved by ice pack.  The perineum is intact and edema present, area of edema on the left side is starting to resolve and has become  less painful.    Postpartum hemoglobin   Hemoglobin   Date Value Ref Range Status   03/11/2020 10.3 (L) 11.7 - 15.7 g/dL Final   Down from 12.0 pt is asymptomatic.    Blood type   Lab Results   Component Value Date    ABO O 03/10/2020       Lab Results   Component Value Date    RH Pos 03/10/2020     Rubella status   Lab Results   Component Value Date    RUQIGG immune 07/02/2019     Rubella: immune  History of depression: anxiety. Postpartum depression warning signs reviewed.    ASSESSMENT/PLAN:  Normal postpartum exam, stable Post-partum day #1  Complications:anemic, plan for iron supplementation  Plan d/c home tomorrow. Home Visit Ordered- No  RTC 2 weeks  Postpartum warning s/s reviewed, including bleeding/clots, fever, mastitis, or depression  Continue prenatal vitamins  Birthcontrol planned:Vasectomy already scheduled.    Current Discharge Medication List      CONTINUE these medications which have NOT CHANGED    Details   albuterol (PROAIR RESPICLICK) 108 (90 Base) MCG/ACT inhaler Inhale 2 puffs into the lungs every 6 hours as needed for shortness of breath / dyspnea or wheezing  Qty: 1 Inhaler, Refills: 0    Associated Diagnoses: Supervision of other normal pregnancy; Cough      Misc. Devices (BREAST PUMP) MISC 1 each daily as needed  Qty: 1 each, Refills: 0    Associated Diagnoses: Postpartum care and examination of lactating mother      order for DME Maternity Belt order  Qty: 1 each, Refills: 0    Associated Diagnoses: Supervision of other normal pregnancy      Prenatal Vit-Fe Fumarate-FA (PRENATAL PLUS) 27-1 MG TABS Take 1 tablet by mouth daily             Valerie Alfaro CNM

## 2020-03-11 NOTE — PLAN OF CARE
Data: Enid Mcbride transferred to 7124 via wheelchair at 2125. Baby transferred via parent's arms.  Action: Receiving unit notified of transfer: Yes. Patient and family notified of room change. Report given to Shu at 2130. Belongings sent to receiving unit. Accompanied by Registered Nurse. Oriented patient to surroundings. Call light within reach. ID bands double-checked with receiving RN.  Response: Patient tolerated transfer and is stable.

## 2020-03-12 VITALS
OXYGEN SATURATION: 97 % | RESPIRATION RATE: 16 BRPM | DIASTOLIC BLOOD PRESSURE: 80 MMHG | TEMPERATURE: 98.2 F | SYSTOLIC BLOOD PRESSURE: 100 MMHG | HEART RATE: 84 BPM

## 2020-03-12 PROCEDURE — 25000132 ZZH RX MED GY IP 250 OP 250 PS 637: Performed by: ADVANCED PRACTICE MIDWIFE

## 2020-03-12 RX ADMIN — ACETAMINOPHEN 650 MG: 325 TABLET, FILM COATED ORAL at 05:45

## 2020-03-12 RX ADMIN — IBUPROFEN 800 MG: 800 TABLET, FILM COATED ORAL at 10:14

## 2020-03-12 RX ADMIN — ACETAMINOPHEN 650 MG: 325 TABLET, FILM COATED ORAL at 10:14

## 2020-03-12 RX ADMIN — IBUPROFEN 800 MG: 800 TABLET, FILM COATED ORAL at 00:31

## 2020-03-12 NOTE — PLAN OF CARE
VSS. PP assessment WNL. Voiding w/o difficulty.   Pain managed with tylenol and IB.   Heat applied for cramping.   Breastfeeding independently. Mother encouraged and supported with feedings on right side and mother feels  is too frantic and unable to achieve a good latch on right side. Mother is exclusively breastfeeding on the left. Writer encouraged mother to pump or hand express the right side.   Bonding well with . No further concerns. Plan for discharge later today.

## 2020-03-12 NOTE — PLAN OF CARE
VSS. Postpartum check WDL. Cramping pain managed with Ibuprofen, Tylenol, and hot packs. Voiding without difficulty. Soaked in tub. Patient taking care of self and infant independently. Breastfeeding independently on L breast, good latch observed. Pt  on R breast once this AM. Encouraged pt to hand express or pump on R breast for stimulation.  Pt educated on discharge instructions and given written handout, verbalized understanding of instructions. Discharged with medications, patient educated on medications and given written copies as well. LA paperwork faxed to [facility] clinic and pt informed to call clinic for confirmation it was received. Patient discharged to board with baby and will discharge to home this evening.

## 2020-03-12 NOTE — PLAN OF CARE
Vital sign stable and postpartum checks within normal limits. Perineum is slight swollen and pt is using ice and tucks for comfort. Pt plans to soak again in the tub. Pt is up voiding and ambulating in the room with no problem. Denies any headaches, but complains of lower back pain and cramping. Pt is breastfeeding , but needs to improve on positioning. Medicated pt with Ibuprofen and Tylenol for pain control. Checked latch. Offer to reposition baby well a the breast, but pt declined. Continue cares.

## 2020-03-12 NOTE — DISCHARGE SUMMARY
Post Partum Note and Discharge Summary  SIGNIFICANT PROBLEMS:  Patient Active Problem List    Diagnosis Date Noted     Family history of cleft palate 05/10/2018     Priority: High     daughter with cleft lip         Family history of deafness and hearing loss 2018     Priority: High     Both parents are deaf, non-genetic cause        Encounter for triage in pregnant patient 03/10/2020     Priority: Medium     Labor and delivery indication for care or intervention 03/10/2020     Priority: Medium     Vaginal delivery 03/10/2020     Priority: Medium     Pregnancy complication 2020     Priority: Medium     Supervision of other normal pregnancy 2019     Priority: Medium     Level 2 ultrasound 10/8/19       GBS (group B streptococcus) UTI complicating pregnancy 2019     Priority: Medium     2019 found in NOB urine, treated  19: UC negative.       Anxiety managed  2018     Priority: Medium     No meds        History of abnormal cervical Pap smear 2018     Priority: Medium     History of CIN1   3/2015: NILM, HPV neg   2016: NILM        Syrinx of spinal cord (H) 2017     Priority: Medium     Vestibular migraine 2017     Priority: Medium       ADMISSION DIAGNOSIS:  Labor and Delivery   DISCHARGE DIAGNOSIS:     HOSPITAL COURSE:  40w5d  Enid Mcbride is a 34 year old female     [facility]  1:18 AM in in early labor.   DELIVERY NOTE:  Labor Course: Enid Mcbride is a 34 year old   at  40w5d presented to labor floor with c/o SROM at approximately 0030. Started to feel contractions were spacing out but getting more uncomfortable.  GBS+ prophylaxis with  PCN  Started on admission.;   0149- sve 5/80/-3, mid/soft SVE.  Pt requested SVE and was found to be 7/80/-2 ~0910 and requested epidural - had failed attempt by anesthesia and proceeded without using non pharmacologic interventions including sterile water papules for  back pain. Pitocin started 1030 for dysfunctional contraction pattern with no cervical change. Pt requested reassessment of SVE ans was found to be 9/100/-3 at 1200, requested again epidural and received adequate relief at 1300. Pt napped, felt intermittent rectal pressure starting at 1400.     Delivery Course:  Pt progressed to complete dilation at 1536 with limited rectal pressure and fetal station -1 with asynclitic presentation. Used peanut ball and positioning to encourage fetal rotation and descent after risk/benefit discussion about passive descent with pt.  Pt had more urge and began active second stage at 1700.  Used tug of war positioning and then side lying while pushing.  +variable decelerations noted with pushing effort, moderate variability throughout.   Pushed effectively with CNM coaching. NICU called with  for light meconium fluid. Head delivered RENETTA and restituted to ROT. No nuchal cord. Shoulders easily delivered under maternal effort in the transverse plane .  Live male  delivered at 1902 over a perineal abrasion under epidural anesthesia.  Spontaneous breath, vigorous cry, well flexed, HR>100. Infant directly to maternal abdomen, skin to skin. NICU left room before 1 minute apgar and resource RN took over acres. Delayed cord clamping until pulsations ceased then clamped x2 and cut by patient.  IV pitocin infusing via postpartum protocol after baby.  Cord blood obtained for typing.  Cord segment for gases. Intact placenta spontaneously delivered via Foley at 1910.   3 vessel cord. Fundus firm @ U+1 after massage but brisk vaginal bleeding noted.  Sweep of vagina expressed no clots but urine noted with fundal massage so ST was performed for 300ml clear, yellow urine.  IV displaced but was retaped by RN team during that time.  IM Methergine given with pt consent.  2 small clots expressed with fundal massage after ST the bleeding stabilized - small lochia noted.  Vagina, and rectum  "inspected,  found to be intact.  Small 1st degree L perineal laceration noted oozing despite direct pressure and was repaired with 3-0 suture on a CT with 1 figure of eight suture under epidural anesthesia.  Hemostasis noted.    Mother and infant stable, continued skin to skin.     Apgars 9/9.  Weight pending QBL 553ml     IUP at 40 weeks gestation delivered on March 10, 2020.     delivery of a viable Male infant.  Weight : pending  Apgars of 9 at 1 minute and 9 at 5 minutes.  Labor was spontaneous.  Medications administered  in labor:  Pain Rx Epidural and sterile H2O injection ; Antibiotics Yes: PCN, adequately treated  Perineum: 1st degree  Placenta-mechanism: spontaneous, intact,  with a 3 vessel cord. IV oxytocin was given.  ST performed.  IM Methergine  QBL was 533ml.  Anticipated Discharge Date: 20  Complications of pregnancy, labor and delivery: Dysfunctional labor, Postpartum Hemorrhage  Birth attendants: NIRAV Koch,CORINNA, JACKIE Mg     I, JACKIE, am serving as a scribe to document services personally performed by CNM based on the provider's statements to me.\" - JACKIE Mg    The encounter was performed by me and scribed by the SNM. The scribed note accurately reflects my personal services and decisions made by me.  NIRAV Acosta CNM    INTERVAL HISTORY:  /80   Pulse 84   Temp 98.2  F (36.8  C) (Oral)   Resp 16   LMP 2019   SpO2 97%   Breastfeeding Unknown   Pt stable, baby is rooming in. Feels well but cramping with breastfeeding. Reviewed taking Ibuprofen scheduled rather than prn  Breast feeding status:initiated and going well  # Discharge Pain Plan:   - During her hospitalization, Enid experienced pain due to .  The pain plan for discharge was discussed with Enid and the plan was created in a collaborative fashion.    - continue with ibupr, tylenol, tub soaks    Complications since 2 hours post delivery: None  Patient is tolerating activity well, " Voiding without difficulty, cramping is relieved by Ibuprophen, lochia is decreasing and patient denies clots.  Perineal pain is is relieved by Ibuprophen.  The perineum is well approximated and nontender    Postpartum hemoglobin   Hemoglobin   Date Value Ref Range Status   2020 10.3 (L) 11.7 - 15.7 g/dL Final     Prenatal Labs:   Lab Results   Component Value Date    ABO O 03/10/2020    RH Pos 03/10/2020    AS Neg 03/10/2020    HEPBANG non reactive 2019    TREPAB Negative 2018    RUQIGG immune 2019     Rubella: immune  History of depression: none. Postpartum depression warning signs reviewed.    ASSESSMENT/PLAN:  Normal postpartum exam , Stable Post-partum day #2  Complications:none  Plan d/c home today. Home Visit Ordered- No  RTC 6 weeks  Postpartum warning s/s reviewed, including bleeding/clots, fever, mastitis, or depression  Kegels/ crunches  Continue prenatal vitamins  Birthcontrol planned:Vasectomy planned. Condoms until clear  PROCEDURES:      Current Discharge Medication List      START taking these medications    Details   acetaminophen (TYLENOL) 325 MG tablet Take 2 tablets (650 mg) by mouth every 6 hours as needed for mild pain Start after Delivery.  Qty: 100 tablet, Refills: 0    Associated Diagnoses: Vaginal delivery      cyanocobalamin (VITAMIN B-12) 1000 MCG SUBL sublingual tablet Place 1 tablet (1,000 mcg) under the tongue daily  Qty: 30 tablet, Refills: 1    Associated Diagnoses: Vaginal delivery; Anemia due to blood loss, acute      ferrous sulfate (FEROSUL) 325 (65 Fe) MG tablet Take 1 tablet (325 mg) by mouth daily (with breakfast)  Qty: 30 tablet, Refills: 1    Associated Diagnoses: Vaginal delivery; Anemia due to blood loss, acute      ibuprofen (ADVIL/MOTRIN) 600 MG tablet Take 1 tablet (600 mg) by mouth every 6 hours as needed for moderate pain Start after delivery  Qty: 60 tablet, Refills: 0    Associated Diagnoses: Vaginal delivery      senna-docusate  (SENOKOT-S/PERICOLACE) 8.6-50 MG tablet Take 1 tablet by mouth daily Start after delivery.  Qty: 100 tablet, Refills: 0    Associated Diagnoses: Vaginal delivery      vitamin C (ASCORBIC ACID) 250 MG TABS tablet Take 1 tablet (250 mg) by mouth daily  Qty: 30 tablet, Refills: 1    Associated Diagnoses: Vaginal delivery; Anemia due to blood loss, acute         CONTINUE these medications which have NOT CHANGED    Details   albuterol (PROAIR RESPICLICK) 108 (90 Base) MCG/ACT inhaler Inhale 2 puffs into the lungs every 6 hours as needed for shortness of breath / dyspnea or wheezing  Qty: 1 Inhaler, Refills: 0    Associated Diagnoses: Supervision of other normal pregnancy; Cough      Misc. Devices (BREAST PUMP) MISC 1 each daily as needed  Qty: 1 each, Refills: 0    Associated Diagnoses: Postpartum care and examination of lactating mother      order for DME Maternity Belt order  Qty: 1 each, Refills: 0    Associated Diagnoses: Supervision of other normal pregnancy      Prenatal Vit-Fe Fumarate-FA (PRENATAL PLUS) 27-1 MG TABS Take 1 tablet by mouth daily           NIRAV Rachel CNM

## 2020-03-16 ENCOUNTER — TELEPHONE (OUTPATIENT)
Dept: OBGYN | Facility: CLINIC | Age: 35
End: 2020-03-16

## 2020-10-06 ENCOUNTER — MYC MEDICAL ADVICE (OUTPATIENT)
Dept: OBGYN | Facility: CLINIC | Age: 35
End: 2020-10-06

## 2020-10-30 ENCOUNTER — OFFICE VISIT (OUTPATIENT)
Dept: OBGYN | Facility: CLINIC | Age: 35
End: 2020-10-30
Attending: MIDWIFE
Payer: COMMERCIAL

## 2020-10-30 VITALS
SYSTOLIC BLOOD PRESSURE: 104 MMHG | DIASTOLIC BLOOD PRESSURE: 71 MMHG | BODY MASS INDEX: 23.16 KG/M2 | HEART RATE: 76 BPM | WEIGHT: 126.6 LBS

## 2020-10-30 DIAGNOSIS — Z97.5 IUD (INTRAUTERINE DEVICE) IN PLACE: ICD-10-CM

## 2020-10-30 DIAGNOSIS — Z30.430 ENCOUNTER FOR INSERTION OF INTRAUTERINE CONTRACEPTIVE DEVICE: Primary | ICD-10-CM

## 2020-10-30 PROCEDURE — 250N000011 HC RX IP 250 OP 636: Performed by: MIDWIFE

## 2020-10-30 PROCEDURE — 58300 INSERT INTRAUTERINE DEVICE: CPT | Performed by: MIDWIFE

## 2020-10-30 RX ADMIN — LEVONORGESTREL 20 MCG: 52 INTRAUTERINE DEVICE INTRAUTERINE at 15:43

## 2020-10-30 ASSESSMENT — PAIN SCALES - GENERAL: PAINLEVEL: NO PAIN (0)

## 2020-10-30 NOTE — PROGRESS NOTES
IUD Insertion:  CONSULT:    Is a pregnancy test required: Yes.  Was it positive or negative?  Negative  Was a consent obtained?  Yes    Subjective: Enid Mcbride is a 35 year old  presents for IUD and desires Mirena type IUD.     Had Mirena [id number]. No issues.     Patient has been given the opportunity to ask questions about all forms of birth control, including all options appropriate for Enid Mcbride. Discussed that no method of birth control, except abstinence is 100% effective against pregnancy or sexually transmitted infection.     Enid Mcbride understands she may have the IUD removed at any time. IUD should be removed by a health care provider.    The entire insertion procedure was reviewed with the patient, including care after placement.    LMP 10/30/2020, started today. No allergy to betadine or shellfish. Recent STD screening      /71   Pulse 76   Wt 57.4 kg (126 lb 9.6 oz)   LMP 10/30/2020   BMI 23.16 kg/m      Pelvic Exam:   EG/BUS: normal genital architecture without lesions, erythema or abnormal secretions.   Vagina: moist, pink, rugae with physiologic discharge and secretions  Cervix: multiparous no lesions and pink, moist, closed, without lesion or CMT  Uterus: midposition, mobile, no pain  Adnexa: within normal limits and no masses, nodularity, tenderness    PROCEDURE NOTE: -- IUD Insertion    Reason for Insertion: contraception    Premedicated with ibuprofen.  Under sterile technique, cervix was visualized with speculum and prepped with Betadine solution swab x 3. Tenaculum was placed for stability. The uterus was gently straightened and sounded to 8.5 cm. IUD prepared for placement, and IUD inserted according to 's instructions without difficulty or significant resitance, and deployed at the fundus. The strings were visualized and trimmed to 3.0 cm from the external os. Tenaculum was removed and hemostasis noted. Speculum removed.  Patient tolerated procedure  well.    Lot # [id number]  Exp: 3/2022    EBL: minimal    Complications: none    ASSESSMENT: No diagnosis found.     PLAN:    Given 's handouts, including when to have IUD removed, list of danger s/sx, side effects and follow up recommended. Encouraged condom use for prevention of STD. Back up contraception advised for 7 days if progestin method. Advised to call for any fever, for prolonged or severe pain or bleeding, abnormal vaginal discharge, or unable to palpate strings. She was advised to use pain medications (ibuprofen) as needed for mild to moderate pain. Advised to follow-up in clinic in 4-6 weeks for IUD string check if unable to find strings or as directed by provider.     NIRAV Hurst CNM

## 2020-10-30 NOTE — LETTER
10/30/2020       RE: Enid Mcbride  [address]  [place] [address]     Dear Colleague,    Thank you for referring your patient, Enid Mcbride, to the [facility] [place] [facility]. Please see a copy of my visit note below.    IUD Insertion:  CONSULT:    Is a pregnancy test required: Yes.  Was it positive or negative?  Negative  Was a consent obtained?  Yes    Subjective: Enid Mcbride is a 35 year old  presents for IUD and desires Mirena type IUD.     Had Mirena [id number]. No issues.     Patient has been given the opportunity to ask questions about all forms of birth control, including all options appropriate for Enid Mcbride. Discussed that no method of birth control, except abstinence is 100% effective against pregnancy or sexually transmitted infection.     Enid Mcbride understands she may have the IUD removed at any time. IUD should be removed by a health care provider.    The entire insertion procedure was reviewed with the patient, including care after placement.    LMP 10/30/2020, started today. No allergy to betadine or shellfish. Recent STD screening      /71   Pulse 76   Wt 57.4 kg (126 lb 9.6 oz)   LMP 10/30/2020   BMI 23.16 kg/m      Pelvic Exam:   EG/BUS: normal genital architecture without lesions, erythema or abnormal secretions.   Vagina: moist, pink, rugae with physiologic discharge and secretions  Cervix: multiparous no lesions and pink, moist, closed, without lesion or CMT  Uterus: midposition, mobile, no pain  Adnexa: within normal limits and no masses, nodularity, tenderness    PROCEDURE NOTE: -- IUD Insertion    Reason for Insertion: contraception    Premedicated with ibuprofen.  Under sterile technique, cervix was visualized with speculum and prepped with Betadine solution swab x 3. Tenaculum was placed for stability. The uterus was gently straightened and sounded to 8.5 cm. IUD prepared for  placement, and IUD inserted according to 's instructions without difficulty or significant resitance, and deployed at the fundus. The strings were visualized and trimmed to 3.0 cm from the external os. Tenaculum was removed and hemostasis noted. Speculum removed.  Patient tolerated procedure well.    Lot # [id number]  Exp: 3/2022    EBL: minimal    Complications: none    ASSESSMENT: No diagnosis found.     PLAN:    Given 's handouts, including when to have IUD removed, list of danger s/sx, side effects and follow up recommended. Encouraged condom use for prevention of STD. Back up contraception advised for 7 days if progestin method. Advised to call for any fever, for prolonged or severe pain or bleeding, abnormal vaginal discharge, or unable to palpate strings. She was advised to use pain medications (ibuprofen) as needed for mild to moderate pain. Advised to follow-up in clinic in 4-6 weeks for IUD string check if unable to find strings or as directed by provider.     NIRAV Hurst CNM

## 2020-10-30 NOTE — TELEPHONE ENCOUNTER
Call from Enid stating that she has an appointment for a postpartum visit and IUD this afternoon.  She got her period today and is asking if she should keep her appointment.  Advised that IUD is actually easier to place during menses.  Had negative PAP and HPV cotesting 8/2019, so likely will not need PAP.  Encouraged to keep appointment as scheduled.

## 2021-01-15 ENCOUNTER — HEALTH MAINTENANCE LETTER (OUTPATIENT)
Age: 36
End: 2021-01-15

## 2021-03-24 ENCOUNTER — OFFICE VISIT (OUTPATIENT)
Dept: OBGYN | Facility: CLINIC | Age: 36
End: 2021-03-24
Attending: ADVANCED PRACTICE MIDWIFE
Payer: COMMERCIAL

## 2021-03-24 VITALS
BODY MASS INDEX: 24.44 KG/M2 | DIASTOLIC BLOOD PRESSURE: 67 MMHG | HEIGHT: 62 IN | WEIGHT: 132.8 LBS | SYSTOLIC BLOOD PRESSURE: 104 MMHG | HEART RATE: 62 BPM

## 2021-03-24 DIAGNOSIS — Z01.419 WOMEN'S ANNUAL ROUTINE GYNECOLOGICAL EXAMINATION: Primary | ICD-10-CM

## 2021-03-24 DIAGNOSIS — G95.0 SYRINX OF SPINAL CORD (H): ICD-10-CM

## 2021-03-24 DIAGNOSIS — R10.2 VAGINAL PAIN: ICD-10-CM

## 2021-03-24 PROBLEM — B95.1 GBS (GROUP B STREPTOCOCCUS) UTI COMPLICATING PREGNANCY: Status: RESOLVED | Noted: 2019-07-05 | Resolved: 2021-03-24

## 2021-03-24 PROBLEM — Z34.80 SUPERVISION OF OTHER NORMAL PREGNANCY: Status: RESOLVED | Noted: 2019-07-26 | Resolved: 2021-03-24

## 2021-03-24 PROBLEM — O26.90 PREGNANCY COMPLICATION: Status: RESOLVED | Noted: 2020-02-01 | Resolved: 2021-03-24

## 2021-03-24 PROBLEM — Z36.89 ENCOUNTER FOR TRIAGE IN PREGNANT PATIENT: Status: RESOLVED | Noted: 2020-03-10 | Resolved: 2021-03-24

## 2021-03-24 PROBLEM — O23.40 GBS (GROUP B STREPTOCOCCUS) UTI COMPLICATING PREGNANCY: Status: RESOLVED | Noted: 2019-07-05 | Resolved: 2021-03-24

## 2021-03-24 LAB
CLUE CELLS: NEGATIVE
TRICHOMONAS (WET PREP): NEGATIVE
YEAST (WET PREP): NEGATIVE

## 2021-03-24 PROCEDURE — 99395 PREV VISIT EST AGE 18-39: CPT | Performed by: ADVANCED PRACTICE MIDWIFE

## 2021-03-24 PROCEDURE — G0463 HOSPITAL OUTPT CLINIC VISIT: HCPCS

## 2021-03-24 PROCEDURE — 87210 SMEAR WET MOUNT SALINE/INK: CPT | Performed by: ADVANCED PRACTICE MIDWIFE

## 2021-03-24 PROCEDURE — 99212 OFFICE O/P EST SF 10 MIN: CPT | Mod: 25 | Performed by: ADVANCED PRACTICE MIDWIFE

## 2021-03-24 ASSESSMENT — PATIENT HEALTH QUESTIONNAIRE - PHQ9
5. POOR APPETITE OR OVEREATING: NOT AT ALL
SUM OF ALL RESPONSES TO PHQ QUESTIONS 1-9: 0

## 2021-03-24 ASSESSMENT — PAIN SCALES - GENERAL: PAINLEVEL: NO PAIN (0)

## 2021-03-24 ASSESSMENT — ANXIETY QUESTIONNAIRES
6. BECOMING EASILY ANNOYED OR IRRITABLE: NOT AT ALL
GAD7 TOTAL SCORE: 0
3. WORRYING TOO MUCH ABOUT DIFFERENT THINGS: NOT AT ALL
1. FEELING NERVOUS, ANXIOUS, OR ON EDGE: NOT AT ALL
5. BEING SO RESTLESS THAT IT IS HARD TO SIT STILL: NOT AT ALL
7. FEELING AFRAID AS IF SOMETHING AWFUL MIGHT HAPPEN: NOT AT ALL
2. NOT BEING ABLE TO STOP OR CONTROL WORRYING: NOT AT ALL

## 2021-03-24 ASSESSMENT — MIFFLIN-ST. JEOR: SCORE: 1255.63
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
## 2021-03-25 ASSESSMENT — ANXIETY QUESTIONNAIRES: GAD7 TOTAL SCORE: 0

## 2021-05-25 ENCOUNTER — RECORDS - HEALTHEAST (OUTPATIENT)
Dept: ADMINISTRATIVE | Facility: CLINIC | Age: 36
End: 2021-05-25

## 2021-05-28 ENCOUNTER — RECORDS - HEALTHEAST (OUTPATIENT)
Dept: ADMINISTRATIVE | Facility: CLINIC | Age: 36
End: 2021-05-28

## 2021-09-05 ENCOUNTER — HEALTH MAINTENANCE LETTER (OUTPATIENT)
Age: 36
End: 2021-09-05

## 2022-06-12 ENCOUNTER — HEALTH MAINTENANCE LETTER (OUTPATIENT)
Age: 37
End: 2022-06-12

## 2022-10-23 ENCOUNTER — HEALTH MAINTENANCE LETTER (OUTPATIENT)
Age: 37
End: 2022-10-23

## 2023-06-18 ENCOUNTER — HEALTH MAINTENANCE LETTER (OUTPATIENT)
Age: 38
End: 2023-06-18
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
## 2025-04-13 ENCOUNTER — HEALTH MAINTENANCE LETTER (OUTPATIENT)
Age: 40
End: 2025-04-13

## 2025-06-18 ENCOUNTER — OFFICE VISIT (OUTPATIENT)
Dept: OBGYN | Facility: CLINIC | Age: 40
End: 2025-06-18
Attending: OBSTETRICS & GYNECOLOGY
Payer: COMMERCIAL

## 2025-06-18 VITALS
HEIGHT: 63 IN | SYSTOLIC BLOOD PRESSURE: 105 MMHG | BODY MASS INDEX: 23.71 KG/M2 | DIASTOLIC BLOOD PRESSURE: 70 MMHG | WEIGHT: 133.8 LBS | HEART RATE: 82 BPM

## 2025-06-18 DIAGNOSIS — Z00.00 ENCOUNTER FOR PREVENTIVE CARE: Primary | ICD-10-CM

## 2025-06-18 PROCEDURE — 99213 OFFICE O/P EST LOW 20 MIN: CPT | Performed by: OBSTETRICS & GYNECOLOGY

## 2025-06-18 PROCEDURE — 99385 PREV VISIT NEW AGE 18-39: CPT | Performed by: OBSTETRICS & GYNECOLOGY

## 2025-06-18 PROCEDURE — 3078F DIAST BP <80 MM HG: CPT | Performed by: OBSTETRICS & GYNECOLOGY

## 2025-06-18 PROCEDURE — 3074F SYST BP LT 130 MM HG: CPT | Performed by: OBSTETRICS & GYNECOLOGY

## 2025-06-18 NOTE — PATIENT INSTRUCTIONS
Thank you for trusting us with your care!   Please be aware, if you are on Mychart, you may see your results prior to your providers review. If labs are abnormal, we will call or message you on Patient Conversation [facility] with a follow up plan.    If you need to contact us for questions about:  Symptoms, Scheduling & Medical Questions; Non-urgent (2-3 day response) Somewhere message, Urgent (needing response today) [phone] (if after 3:30pm next day response)   Prescriptions: Please call your Pharmacy   Billing: Bob [phone] or [facility]:[phone]
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
## 2025-06-23 ENCOUNTER — MYC MEDICAL ADVICE (OUTPATIENT)
Dept: OBGYN | Facility: CLINIC | Age: 40
End: 2025-06-23
Payer: COMMERCIAL
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
## 2025-07-03 ENCOUNTER — TRANSFERRED RECORDS (OUTPATIENT)
Dept: GASTROENTEROLOGY | Facility: CLINIC | Age: 40
End: 2025-07-03
Payer: COMMERCIAL

## 2025-07-07 PROBLEM — D12.6 ADENOMATOUS POLYP OF COLON: Status: ACTIVE | Noted: 2025-07-07

## 2025-07-07 NOTE — PROCEDURES
[facility]   [address] [place] [address], [address], [place] [address]     Patient Name: Enid Mcbride  Gender:  Female  Exam Date: 07/03/2025 Visit Number:  [id number]  Age: 40 Years YOB: 1985  Attending MD: Mulu Hernandez MD Medical Record#:  [id number]    Procedure: Colonoscopy   Indications: Change in bowel habits; constipation      Referring MD: Referral Self  Primary MD:      Meme Mitchell MD  Medications: Admitting Medications:   0.9% Normal Saline at TKO   Intra Procedure Medications:   Patient received monitored anesthesia care.     Complications: No immediate complications  ______________________________________________________________________________  Procedure:   An examination of the heart and lungs was performed and found to be within acceptable limits.  .  The patient was therefore deemed a reasonable candidate for endoscopy and sedation.   The risks and benefits of the procedure were explained to the patient.After obtaining informed consent, the patient received monitored anesthesia care and I passed the scope   without difficulty via the rectum to the cecum.  The appendiceal orifice and ic valve were identified.  The scope was retroflexed during the examination  The quality of the prep was good  (Miralax/Gatorade Double Prep).    This was a complete examination throughout the entire colon.    Findings:    Polyp location: ascending colon.  Quantity: 1.  Size: 10 mm.  Polyp shape:  flat lesion.         Maneuver: polypectomy was performed with a cold snare.       Removal:  complete.  Retrieval: complete.  Bleeding: none.    Polyp location: transverse colon.  Quantity: 1.  Size: 4 mm.  Polyp shape: sessile.         Maneuver: polypectomy was performed with a  cold snare.       Removal: complete.  Retrieval: complete.  Bleeding: none.    Hemorrhoids.  Internal hemorrhoids without bleeding.  Remainder of the exam is normal.    Impression:  Polyp of colon,  unspecified part of colon, unspecified type  Internal hemorrhoids    Preliminary Plan:  The patient and their physician will receive a copy of the pathology report as well as pathology-based recommendations for future screening or surveillance.  Repeat colonoscopy in 3 years  Recommendation Comments:    No NSAIDs for 10 days    First-degree relatives should discuss colonoscopy with their healthcare providers given findings of an advanced precancerous polyp at age 40.  Pathology Results:  A: COLON, ASCENDING, POLYP:           1. Sessile serrated adenoma           2. Negative for overt dysplasia           3. Per the colonoscopy report:               a. Polyp size: 10 mm               b. Resection: Complete               c. Retrieval: Complete      B: COLON, TRANSVERSE, POLYP:           1. Tubular adenoma           2. Negative for high grade dysplasia           3. Per the colonoscopy report:               a. Polyp size: 4 mm               b. Resection: Complete               c. Retrieval: Complete      MICROSCOPIC  A: Performed   B: Performed     Electronically signed by: Robby Hernandez MD    Interpreted at [facility], [address] [place] [id number]    Orders    Instruction(s)/Education:  Instruction/Education Timeframe Assessment   Colon Cancer Prevention  K63.5   Colon Polyps  K63.5   Hemorrhoids  K63.5   High Fiber Diet  K63.5       Final Plan:  Repeat colonoscopy in 3 years.    We will attempt to contact you at appropriate intervals via [place] mail.  We may not be able to find you or contact you at that time, therefore you should know that the responsibility for following our recommendation rests with you.  If you don't hear from us at the time your procedure is due, please contact our office to schedule an appointment.  If your contact information should change, please contact our office so that we can update your record.      _Electronically signed  by:___________________  Mulu Hernandez MD                 07/03/2025    cc: Meme Mitchell MD

## 2025-07-08 ENCOUNTER — PATIENT OUTREACH (OUTPATIENT)
Dept: GASTROENTEROLOGY | Facility: CLINIC | Age: 40
End: 2025-07-08
Payer: COMMERCIAL